# Patient Record
Sex: FEMALE | Race: WHITE | NOT HISPANIC OR LATINO | Employment: OTHER | URBAN - METROPOLITAN AREA
[De-identification: names, ages, dates, MRNs, and addresses within clinical notes are randomized per-mention and may not be internally consistent; named-entity substitution may affect disease eponyms.]

---

## 2017-03-02 ENCOUNTER — HISTORICAL (OUTPATIENT)
Dept: ADMINISTRATIVE | Facility: HOSPITAL | Age: 82
End: 2017-03-02

## 2017-05-15 ENCOUNTER — HISTORICAL (OUTPATIENT)
Dept: ADMINISTRATIVE | Facility: HOSPITAL | Age: 82
End: 2017-05-15

## 2017-05-15 LAB
ABS NEUT (OLG): 5.9 X10(3)/MCL (ref 2.1–9.2)
ALBUMIN SERPL-MCNC: 3.3 GM/DL (ref 3.4–5)
ALBUMIN/GLOB SERPL: 1.1 RATIO (ref 1.1–2)
ALP SERPL-CCNC: 50 UNIT/L (ref 38–126)
ALT SERPL-CCNC: 19 UNIT/L (ref 12–78)
AST SERPL-CCNC: 11 UNIT/L (ref 15–37)
BASOPHILS # BLD AUTO: 0 X10(3)/MCL (ref 0–0.2)
BASOPHILS NFR BLD AUTO: 0 %
BILIRUB SERPL-MCNC: 0.5 MG/DL (ref 0.2–1)
BILIRUBIN DIRECT+TOT PNL SERPL-MCNC: 0.2 MG/DL (ref 0–0.5)
BILIRUBIN DIRECT+TOT PNL SERPL-MCNC: 0.3 MG/DL (ref 0–0.8)
BUN SERPL-MCNC: 20 MG/DL (ref 7–18)
CALCIUM SERPL-MCNC: 9.4 MG/DL (ref 8.5–10.1)
CHLORIDE SERPL-SCNC: 107 MMOL/L (ref 98–107)
CO2 SERPL-SCNC: 28 MMOL/L (ref 21–32)
CREAT SERPL-MCNC: 0.7 MG/DL (ref 0.55–1.02)
DEPRECATED CALCIDIOL+CALCIFEROL SERPL-MC: 34.44 NG/ML (ref 30–80)
EOSINOPHIL # BLD AUTO: 0.2 X10(3)/MCL (ref 0–0.9)
EOSINOPHIL NFR BLD AUTO: 2 %
ERYTHROCYTE [DISTWIDTH] IN BLOOD BY AUTOMATED COUNT: 15 % (ref 11.5–17)
ERYTHROCYTE [SEDIMENTATION RATE] IN BLOOD: 28 MM/HR (ref 0–20)
EST. AVERAGE GLUCOSE BLD GHB EST-MCNC: 189 MG/DL
GLOBULIN SER-MCNC: 3 GM/DL (ref 2.4–3.5)
GLUCOSE SERPL-MCNC: 66 MG/DL (ref 74–106)
HBA1C MFR BLD: 8.2 % (ref 4.2–6.3)
HCT VFR BLD AUTO: 36.4 % (ref 37–47)
HGB BLD-MCNC: 11.4 GM/DL (ref 12–16)
LYMPHOCYTES # BLD AUTO: 1.4 X10(3)/MCL (ref 0.6–4.6)
LYMPHOCYTES NFR BLD AUTO: 16 %
MAGNESIUM SERPL-MCNC: 2.1 MG/DL (ref 1.8–2.4)
MCH RBC QN AUTO: 25.7 PG (ref 27–31)
MCHC RBC AUTO-ENTMCNC: 31.3 GM/DL (ref 33–36)
MCV RBC AUTO: 82.2 FL (ref 80–94)
MONOCYTES # BLD AUTO: 0.7 X10(3)/MCL (ref 0.1–1.3)
MONOCYTES NFR BLD AUTO: 9 %
NEUTROPHILS # BLD AUTO: 5.9 X10(3)/MCL (ref 2.1–9.2)
NEUTROPHILS NFR BLD AUTO: 72 %
PLATELET # BLD AUTO: 308 X10(3)/MCL (ref 130–400)
PMV BLD AUTO: 11.2 FL (ref 9.4–12.4)
POTASSIUM SERPL-SCNC: 4 MMOL/L (ref 3.5–5.1)
PROT SERPL-MCNC: 6.3 GM/DL (ref 6.4–8.2)
RBC # BLD AUTO: 4.43 X10(6)/MCL (ref 4.2–5.4)
SODIUM SERPL-SCNC: 147 MMOL/L (ref 136–145)
WBC # SPEC AUTO: 8.2 X10(3)/MCL (ref 4.5–11.5)

## 2017-05-18 ENCOUNTER — HISTORICAL (OUTPATIENT)
Dept: ADMINISTRATIVE | Facility: HOSPITAL | Age: 82
End: 2017-05-18

## 2017-07-17 ENCOUNTER — HISTORICAL (OUTPATIENT)
Dept: ADMINISTRATIVE | Facility: HOSPITAL | Age: 82
End: 2017-07-17

## 2017-07-17 LAB
ABS NEUT (OLG): 5.4 X10(3)/MCL (ref 2.1–9.2)
ALBUMIN SERPL-MCNC: 3.3 GM/DL (ref 3.4–5)
ALBUMIN/GLOB SERPL: 1.1 {RATIO}
ALP SERPL-CCNC: 53 UNIT/L (ref 38–126)
ALT SERPL-CCNC: 19 UNIT/L (ref 12–78)
AST SERPL-CCNC: 7 UNIT/L (ref 15–37)
BASOPHILS # BLD AUTO: 0 X10(3)/MCL (ref 0–0.2)
BASOPHILS NFR BLD AUTO: 0 %
BILIRUB SERPL-MCNC: 0.3 MG/DL (ref 0.2–1)
BILIRUBIN DIRECT+TOT PNL SERPL-MCNC: 0.1 MG/DL (ref 0–0.8)
BILIRUBIN DIRECT+TOT PNL SERPL-MCNC: 0.2 MG/DL (ref 0–0.2)
BUN SERPL-MCNC: 21 MG/DL (ref 7–18)
CALCIUM SERPL-MCNC: 9.1 MG/DL (ref 8.5–10.1)
CHLORIDE SERPL-SCNC: 105 MMOL/L (ref 98–107)
CO2 SERPL-SCNC: 33 MMOL/L (ref 21–32)
CREAT SERPL-MCNC: 0.66 MG/DL (ref 0.55–1.02)
DEPRECATED CALCIDIOL+CALCIFEROL SERPL-MC: 31.34 NG/ML (ref 30–80)
EOSINOPHIL # BLD AUTO: 0.2 X10(3)/MCL (ref 0–0.9)
EOSINOPHIL NFR BLD AUTO: 2 %
ERYTHROCYTE [DISTWIDTH] IN BLOOD BY AUTOMATED COUNT: 14.9 % (ref 11.5–17)
ERYTHROCYTE [SEDIMENTATION RATE] IN BLOOD: 25 MM/HR (ref 0–20)
GLOBULIN SER-MCNC: 3 GM/DL (ref 2.4–3.5)
GLUCOSE SERPL-MCNC: 147 MG/DL (ref 74–106)
HCT VFR BLD AUTO: 36.2 % (ref 37–47)
HGB BLD-MCNC: 11.3 GM/DL (ref 12–16)
LYMPHOCYTES # BLD AUTO: 1.3 X10(3)/MCL (ref 0.6–4.6)
LYMPHOCYTES NFR BLD AUTO: 17 %
MAGNESIUM SERPL-MCNC: 1.8 MG/DL (ref 1.8–2.4)
MCH RBC QN AUTO: 25.6 PG (ref 27–31)
MCHC RBC AUTO-ENTMCNC: 31.2 GM/DL (ref 33–36)
MCV RBC AUTO: 81.9 FL (ref 80–94)
MONOCYTES # BLD AUTO: 0.7 X10(3)/MCL (ref 0.1–1.3)
MONOCYTES NFR BLD AUTO: 9 %
NEUTROPHILS # BLD AUTO: 5.4 X10(3)/MCL (ref 2.1–9.2)
NEUTROPHILS NFR BLD AUTO: 71 %
PLATELET # BLD AUTO: 296 X10(3)/MCL (ref 130–400)
PMV BLD AUTO: 11.4 FL (ref 9.4–12.4)
POTASSIUM SERPL-SCNC: 3.9 MMOL/L (ref 3.5–5.1)
PROT SERPL-MCNC: 6.3 GM/DL (ref 6.4–8.2)
RBC # BLD AUTO: 4.42 X10(6)/MCL (ref 4.2–5.4)
SODIUM SERPL-SCNC: 144 MMOL/L (ref 136–145)
WBC # SPEC AUTO: 7.6 X10(3)/MCL (ref 4.5–11.5)

## 2017-08-24 ENCOUNTER — HISTORICAL (OUTPATIENT)
Dept: ADMINISTRATIVE | Facility: HOSPITAL | Age: 82
End: 2017-08-24

## 2017-08-24 LAB
ABS NEUT (OLG): 5.65 X10(3)/MCL (ref 2.1–9.2)
ALBUMIN SERPL-MCNC: 3.2 GM/DL (ref 3.4–5)
ALBUMIN/GLOB SERPL: 1 RATIO (ref 1.1–2)
ALP SERPL-CCNC: 57 UNIT/L (ref 38–126)
ALT SERPL-CCNC: 20 UNIT/L (ref 12–78)
AST SERPL-CCNC: 11 UNIT/L (ref 15–37)
BASOPHILS # BLD AUTO: 0 X10(3)/MCL (ref 0–0.2)
BASOPHILS NFR BLD AUTO: 0 %
BILIRUB SERPL-MCNC: 0.3 MG/DL (ref 0.2–1)
BILIRUBIN DIRECT+TOT PNL SERPL-MCNC: 0.1 MG/DL (ref 0–0.5)
BILIRUBIN DIRECT+TOT PNL SERPL-MCNC: 0.2 MG/DL (ref 0–0.8)
BUN SERPL-MCNC: 21 MG/DL (ref 7–18)
CALCIUM SERPL-MCNC: 9.4 MG/DL (ref 8.5–10.1)
CHLORIDE SERPL-SCNC: 106 MMOL/L (ref 98–107)
CHOLEST SERPL-MCNC: 139 MG/DL (ref 0–200)
CHOLEST/HDLC SERPL: 3.8 {RATIO} (ref 0–4)
CK SERPL-CCNC: 56 UNIT/L (ref 26–192)
CO2 SERPL-SCNC: 30 MMOL/L (ref 21–32)
CREAT SERPL-MCNC: 0.84 MG/DL (ref 0.55–1.02)
EOSINOPHIL # BLD AUTO: 0.2 X10(3)/MCL (ref 0–0.9)
EOSINOPHIL NFR BLD AUTO: 2 %
ERYTHROCYTE [DISTWIDTH] IN BLOOD BY AUTOMATED COUNT: 15.3 % (ref 11.5–17)
EST. AVERAGE GLUCOSE BLD GHB EST-MCNC: 189 MG/DL
GLOBULIN SER-MCNC: 3.2 GM/DL (ref 2.4–3.5)
GLUCOSE SERPL-MCNC: 157 MG/DL (ref 74–106)
HBA1C MFR BLD: 8.2 % (ref 4.2–6.3)
HCT VFR BLD AUTO: 34.5 % (ref 37–47)
HDLC SERPL-MCNC: 37 MG/DL (ref 35–60)
HGB BLD-MCNC: 10.8 GM/DL (ref 12–16)
LDLC SERPL CALC-MCNC: 68 MG/DL (ref 0–129)
LYMPHOCYTES # BLD AUTO: 1.2 X10(3)/MCL (ref 0.6–4.6)
LYMPHOCYTES NFR BLD AUTO: 16 %
MCH RBC QN AUTO: 25.1 PG (ref 27–31)
MCHC RBC AUTO-ENTMCNC: 31.3 GM/DL (ref 33–36)
MCV RBC AUTO: 80 FL (ref 80–94)
MONOCYTES # BLD AUTO: 0.8 X10(3)/MCL (ref 0.1–1.3)
MONOCYTES NFR BLD AUTO: 10 %
NEUTROPHILS # BLD AUTO: 5.65 X10(3)/MCL (ref 2.1–9.2)
NEUTROPHILS NFR BLD AUTO: 72 %
PLATELET # BLD AUTO: 287 X10(3)/MCL (ref 130–400)
PMV BLD AUTO: 10.4 FL (ref 9.4–12.4)
POTASSIUM SERPL-SCNC: 3.6 MMOL/L (ref 3.5–5.1)
PROT SERPL-MCNC: 6.4 GM/DL (ref 6.4–8.2)
RBC # BLD AUTO: 4.31 X10(6)/MCL (ref 4.2–5.4)
SODIUM SERPL-SCNC: 144 MMOL/L (ref 136–145)
TRIGL SERPL-MCNC: 169 MG/DL (ref 30–150)
TSH SERPL-ACNC: 1.13 MIU/ML (ref 0.36–3.74)
VLDLC SERPL CALC-MCNC: 34 MG/DL
WBC # SPEC AUTO: 7.9 X10(3)/MCL (ref 4.5–11.5)

## 2017-11-27 ENCOUNTER — HISTORICAL (OUTPATIENT)
Dept: ADMINISTRATIVE | Facility: HOSPITAL | Age: 82
End: 2017-11-27

## 2017-11-27 LAB
ABS NEUT (OLG): 5.52 X10(3)/MCL (ref 2.1–9.2)
ALBUMIN SERPL-MCNC: 3.1 GM/DL (ref 3.4–5)
ALBUMIN/GLOB SERPL: 1 {RATIO}
ALP SERPL-CCNC: 76 UNIT/L (ref 38–126)
ALT SERPL-CCNC: 27 UNIT/L (ref 12–78)
AST SERPL-CCNC: 14 UNIT/L (ref 15–37)
BASOPHILS # BLD AUTO: 0 X10(3)/MCL (ref 0–0.2)
BASOPHILS NFR BLD AUTO: 0 %
BILIRUB SERPL-MCNC: 0.7 MG/DL (ref 0.2–1)
BILIRUBIN DIRECT+TOT PNL SERPL-MCNC: 0.2 MG/DL (ref 0–0.2)
BILIRUBIN DIRECT+TOT PNL SERPL-MCNC: 0.5 MG/DL (ref 0–0.8)
BUN SERPL-MCNC: 15 MG/DL (ref 7–18)
CALCIUM SERPL-MCNC: 9.1 MG/DL (ref 8.5–10.1)
CHLORIDE SERPL-SCNC: 106 MMOL/L (ref 98–107)
CO2 SERPL-SCNC: 32 MMOL/L (ref 21–32)
CREAT SERPL-MCNC: 0.64 MG/DL (ref 0.55–1.02)
DEPRECATED CALCIDIOL+CALCIFEROL SERPL-MC: 34.35 NG/ML (ref 30–80)
EOSINOPHIL # BLD AUTO: 0.2 X10(3)/MCL (ref 0–0.9)
EOSINOPHIL NFR BLD AUTO: 3 %
ERYTHROCYTE [DISTWIDTH] IN BLOOD BY AUTOMATED COUNT: 15.9 % (ref 11.5–17)
ERYTHROCYTE [SEDIMENTATION RATE] IN BLOOD: 33 MM/HR (ref 0–20)
GLOBULIN SER-MCNC: 3.2 GM/DL (ref 2.4–3.5)
GLUCOSE SERPL-MCNC: 111 MG/DL (ref 74–106)
HCT VFR BLD AUTO: 35.9 % (ref 37–47)
HGB BLD-MCNC: 11 GM/DL (ref 12–16)
LYMPHOCYTES # BLD AUTO: 1.1 X10(3)/MCL (ref 0.6–4.6)
LYMPHOCYTES NFR BLD AUTO: 15 %
MAGNESIUM SERPL-MCNC: 1.8 MG/DL (ref 1.8–2.4)
MCH RBC QN AUTO: 24.4 PG (ref 27–31)
MCHC RBC AUTO-ENTMCNC: 30.6 GM/DL (ref 33–36)
MCV RBC AUTO: 79.6 FL (ref 80–94)
MONOCYTES # BLD AUTO: 0.8 X10(3)/MCL (ref 0.1–1.3)
MONOCYTES NFR BLD AUTO: 10 %
NEUTROPHILS # BLD AUTO: 5.52 X10(3)/MCL (ref 2.1–9.2)
NEUTROPHILS NFR BLD AUTO: 71 %
PLATELET # BLD AUTO: 298 X10(3)/MCL (ref 130–400)
PMV BLD AUTO: 10.5 FL (ref 9.4–12.4)
POTASSIUM SERPL-SCNC: 3.7 MMOL/L (ref 3.5–5.1)
PROT SERPL-MCNC: 6.3 GM/DL (ref 6.4–8.2)
RBC # BLD AUTO: 4.51 X10(6)/MCL (ref 4.2–5.4)
SODIUM SERPL-SCNC: 144 MMOL/L (ref 136–145)
WBC # SPEC AUTO: 7.8 X10(3)/MCL (ref 4.5–11.5)

## 2017-11-29 ENCOUNTER — HISTORICAL (OUTPATIENT)
Dept: ADMINISTRATIVE | Facility: HOSPITAL | Age: 82
End: 2017-11-29

## 2018-02-20 ENCOUNTER — HISTORICAL (OUTPATIENT)
Dept: ADMINISTRATIVE | Facility: HOSPITAL | Age: 83
End: 2018-02-20

## 2018-02-20 LAB
ABS NEUT (OLG): 5.2 X10(3)/MCL (ref 2.1–9.2)
ALBUMIN SERPL-MCNC: 3 GM/DL (ref 3.4–5)
ALBUMIN/GLOB SERPL: 0.9 {RATIO}
ALP SERPL-CCNC: 59 UNIT/L (ref 38–126)
ALT SERPL-CCNC: 14 UNIT/L (ref 12–78)
APPEARANCE, UA: ABNORMAL
AST SERPL-CCNC: 5 UNIT/L (ref 15–37)
BACTERIA SPEC CULT: ABNORMAL /HPF
BASOPHILS # BLD AUTO: 0 X10(3)/MCL (ref 0–0.2)
BASOPHILS NFR BLD AUTO: 0 %
BILIRUB SERPL-MCNC: 0.4 MG/DL (ref 0.2–1)
BILIRUB UR QL STRIP: NEGATIVE
BILIRUBIN DIRECT+TOT PNL SERPL-MCNC: 0.1 MG/DL (ref 0–0.2)
BILIRUBIN DIRECT+TOT PNL SERPL-MCNC: 0.3 MG/DL (ref 0–0.8)
BUN SERPL-MCNC: 28 MG/DL (ref 7–18)
CALCIUM SERPL-MCNC: 9 MG/DL (ref 8.5–10.1)
CHLORIDE SERPL-SCNC: 105 MMOL/L (ref 98–107)
CHOLEST SERPL-MCNC: 126 MG/DL (ref 0–200)
CHOLEST/HDLC SERPL: 3.7 {RATIO} (ref 0–4)
CK SERPL-CCNC: 28 UNIT/L (ref 26–192)
CO2 SERPL-SCNC: 32 MMOL/L (ref 21–32)
COLOR UR: YELLOW
CREAT SERPL-MCNC: 0.92 MG/DL (ref 0.55–1.02)
EOSINOPHIL # BLD AUTO: 0.3 X10(3)/MCL (ref 0–0.9)
EOSINOPHIL NFR BLD AUTO: 4 %
ERYTHROCYTE [DISTWIDTH] IN BLOOD BY AUTOMATED COUNT: 17.1 % (ref 11.5–17)
EST. AVERAGE GLUCOSE BLD GHB EST-MCNC: 171 MG/DL
GLOBULIN SER-MCNC: 3.3 GM/DL (ref 2.4–3.5)
GLUCOSE (UA): NEGATIVE
GLUCOSE SERPL-MCNC: 82 MG/DL (ref 74–106)
HBA1C MFR BLD: 7.6 % (ref 4.2–6.3)
HCT VFR BLD AUTO: 34.1 % (ref 37–47)
HDLC SERPL-MCNC: 34 MG/DL (ref 35–60)
HGB BLD-MCNC: 10.6 GM/DL (ref 12–16)
HGB UR QL STRIP: NEGATIVE
KETONES UR QL STRIP: NEGATIVE
LDLC SERPL CALC-MCNC: 73 MG/DL (ref 0–129)
LEUKOCYTE ESTERASE UR QL STRIP: ABNORMAL
LYMPHOCYTES # BLD AUTO: 1.3 X10(3)/MCL (ref 0.6–4.6)
LYMPHOCYTES NFR BLD AUTO: 17 %
MCH RBC QN AUTO: 24.4 PG (ref 27–31)
MCHC RBC AUTO-ENTMCNC: 31.1 GM/DL (ref 33–36)
MCV RBC AUTO: 78.6 FL (ref 80–94)
MONOCYTES # BLD AUTO: 0.7 X10(3)/MCL (ref 0.1–1.3)
MONOCYTES NFR BLD AUTO: 10 %
NEUTROPHILS # BLD AUTO: 5.2 X10(3)/MCL (ref 2.1–9.2)
NEUTROPHILS NFR BLD AUTO: 69 %
NITRITE UR QL STRIP: NEGATIVE
PH UR STRIP: 6 [PH] (ref 5–9)
PLATELET # BLD AUTO: 299 X10(3)/MCL (ref 130–400)
PMV BLD AUTO: 10.7 FL (ref 9.4–12.4)
POTASSIUM SERPL-SCNC: 3.9 MMOL/L (ref 3.5–5.1)
PROT SERPL-MCNC: 6.3 GM/DL (ref 6.4–8.2)
PROT UR QL STRIP: NEGATIVE
RBC # BLD AUTO: 4.34 X10(6)/MCL (ref 4.2–5.4)
RBC #/AREA URNS HPF: ABNORMAL /[HPF]
SODIUM SERPL-SCNC: 143 MMOL/L (ref 136–145)
SP GR UR STRIP: 1.02 (ref 1–1.03)
SQUAMOUS EPITHELIAL, UA: ABNORMAL
TRIGL SERPL-MCNC: 97 MG/DL (ref 30–150)
TSH SERPL-ACNC: 2.23 MIU/ML (ref 0.36–3.74)
UROBILINOGEN UR STRIP-ACNC: 0.2
VLDLC SERPL CALC-MCNC: 19 MG/DL
WBC # SPEC AUTO: 7.6 X10(3)/MCL (ref 4.5–11.5)
WBC #/AREA URNS HPF: 6 /HPF (ref 0–3)

## 2018-02-22 LAB — FINAL CULTURE: NORMAL

## 2018-05-14 ENCOUNTER — HISTORICAL (OUTPATIENT)
Dept: ADMINISTRATIVE | Facility: HOSPITAL | Age: 83
End: 2018-05-14

## 2018-05-14 LAB
ABS NEUT (OLG): 6 X10(3)/MCL (ref 2.1–9.2)
ALBUMIN SERPL-MCNC: 3.3 GM/DL (ref 3.4–5)
ALBUMIN/GLOB SERPL: 1.1 RATIO (ref 1.1–2)
ALP SERPL-CCNC: 54 UNIT/L (ref 38–126)
ALT SERPL-CCNC: 20 UNIT/L (ref 12–78)
AST SERPL-CCNC: 9 UNIT/L (ref 15–37)
BASOPHILS # BLD AUTO: 0 X10(3)/MCL (ref 0–0.2)
BASOPHILS NFR BLD AUTO: 0 %
BILIRUB SERPL-MCNC: 0.4 MG/DL (ref 0.2–1)
BILIRUBIN DIRECT+TOT PNL SERPL-MCNC: 0.1 MG/DL (ref 0–0.5)
BILIRUBIN DIRECT+TOT PNL SERPL-MCNC: 0.3 MG/DL (ref 0–0.8)
BUN SERPL-MCNC: 30 MG/DL (ref 7–18)
CALCIUM SERPL-MCNC: 9.3 MG/DL (ref 8.5–10.1)
CHLORIDE SERPL-SCNC: 104 MMOL/L (ref 98–107)
CO2 SERPL-SCNC: 32 MMOL/L (ref 21–32)
CREAT SERPL-MCNC: 0.83 MG/DL (ref 0.55–1.02)
DEPRECATED CALCIDIOL+CALCIFEROL SERPL-MC: 38 NG/ML (ref 30–80)
EOSINOPHIL # BLD AUTO: 0.2 X10(3)/MCL (ref 0–0.9)
EOSINOPHIL NFR BLD AUTO: 3 %
ERYTHROCYTE [DISTWIDTH] IN BLOOD BY AUTOMATED COUNT: 15.8 % (ref 11.5–17)
ERYTHROCYTE [SEDIMENTATION RATE] IN BLOOD: 43 MM/HR (ref 0–20)
GLOBULIN SER-MCNC: 3 GM/DL (ref 2.4–3.5)
GLUCOSE SERPL-MCNC: 150 MG/DL (ref 74–106)
HCT VFR BLD AUTO: 34.2 % (ref 37–47)
HGB BLD-MCNC: 10.5 GM/DL (ref 12–16)
LYMPHOCYTES # BLD AUTO: 1.1 X10(3)/MCL (ref 0.6–4.6)
LYMPHOCYTES NFR BLD AUTO: 14 %
MAGNESIUM SERPL-MCNC: 1.8 MG/DL (ref 1.8–2.4)
MCH RBC QN AUTO: 25.3 PG (ref 27–31)
MCHC RBC AUTO-ENTMCNC: 30.7 GM/DL (ref 33–36)
MCV RBC AUTO: 82.4 FL (ref 80–94)
MONOCYTES # BLD AUTO: 0.8 X10(3)/MCL (ref 0.1–1.3)
MONOCYTES NFR BLD AUTO: 10 %
NEUTROPHILS # BLD AUTO: 6 X10(3)/MCL (ref 2.1–9.2)
NEUTROPHILS NFR BLD AUTO: 73 %
PLATELET # BLD AUTO: 279 X10(3)/MCL (ref 130–400)
PMV BLD AUTO: 11.2 FL (ref 9.4–12.4)
POTASSIUM SERPL-SCNC: 3.7 MMOL/L (ref 3.5–5.1)
PROT SERPL-MCNC: 6.3 GM/DL (ref 6.4–8.2)
RBC # BLD AUTO: 4.15 X10(6)/MCL (ref 4.2–5.4)
SODIUM SERPL-SCNC: 142 MMOL/L (ref 136–145)
WBC # SPEC AUTO: 8.2 X10(3)/MCL (ref 4.5–11.5)

## 2018-06-21 ENCOUNTER — HISTORICAL (OUTPATIENT)
Dept: ADMINISTRATIVE | Facility: HOSPITAL | Age: 83
End: 2018-06-21

## 2018-06-21 LAB
ABS NEUT (OLG): 5.96 X10(3)/MCL (ref 2.1–9.2)
ALBUMIN SERPL-MCNC: 3.2 GM/DL (ref 3.4–5)
ALBUMIN/GLOB SERPL: 1 RATIO (ref 1.1–2)
ALP SERPL-CCNC: 60 UNIT/L (ref 38–126)
ALT SERPL-CCNC: 20 UNIT/L (ref 12–78)
AST SERPL-CCNC: 12 UNIT/L (ref 15–37)
BASOPHILS # BLD AUTO: 0 X10(3)/MCL (ref 0–0.2)
BASOPHILS NFR BLD AUTO: 0 %
BILIRUB SERPL-MCNC: 0.4 MG/DL (ref 0.2–1)
BILIRUBIN DIRECT+TOT PNL SERPL-MCNC: 0.2 MG/DL (ref 0–0.5)
BILIRUBIN DIRECT+TOT PNL SERPL-MCNC: 0.2 MG/DL (ref 0–0.8)
BUN SERPL-MCNC: 22 MG/DL (ref 7–18)
CALCIUM SERPL-MCNC: 9.1 MG/DL (ref 8.5–10.1)
CHLORIDE SERPL-SCNC: 104 MMOL/L (ref 98–107)
CHOLEST SERPL-MCNC: 149 MG/DL (ref 0–200)
CHOLEST/HDLC SERPL: 4.8 {RATIO} (ref 0–4)
CK SERPL-CCNC: 42 UNIT/L (ref 26–192)
CO2 SERPL-SCNC: 30 MMOL/L (ref 21–32)
CREAT SERPL-MCNC: 0.82 MG/DL (ref 0.55–1.02)
EOSINOPHIL # BLD AUTO: 0.1 X10(3)/MCL (ref 0–0.9)
EOSINOPHIL NFR BLD AUTO: 1 %
ERYTHROCYTE [DISTWIDTH] IN BLOOD BY AUTOMATED COUNT: 15.9 % (ref 11.5–17)
EST. AVERAGE GLUCOSE BLD GHB EST-MCNC: 174 MG/DL
GLOBULIN SER-MCNC: 3.1 GM/DL (ref 2.4–3.5)
GLUCOSE SERPL-MCNC: 185 MG/DL (ref 74–106)
HBA1C MFR BLD: 7.7 % (ref 4.2–6.3)
HCT VFR BLD AUTO: 36.8 % (ref 37–47)
HDLC SERPL-MCNC: 31 MG/DL (ref 35–60)
HGB BLD-MCNC: 11.2 GM/DL (ref 12–16)
LDLC SERPL CALC-MCNC: 94 MG/DL (ref 0–129)
LYMPHOCYTES # BLD AUTO: 1 X10(3)/MCL (ref 0.6–4.6)
LYMPHOCYTES NFR BLD AUTO: 13 %
MCH RBC QN AUTO: 24.9 PG (ref 27–31)
MCHC RBC AUTO-ENTMCNC: 30.4 GM/DL (ref 33–36)
MCV RBC AUTO: 81.8 FL (ref 80–94)
MONOCYTES # BLD AUTO: 0.6 X10(3)/MCL (ref 0.1–1.3)
MONOCYTES NFR BLD AUTO: 7 %
NEUTROPHILS # BLD AUTO: 5.96 X10(3)/MCL (ref 2.1–9.2)
NEUTROPHILS NFR BLD AUTO: 77 %
PLATELET # BLD AUTO: 290 X10(3)/MCL (ref 130–400)
PMV BLD AUTO: 10.9 FL (ref 9.4–12.4)
POTASSIUM SERPL-SCNC: 4.1 MMOL/L (ref 3.5–5.1)
PROT SERPL-MCNC: 6.3 GM/DL (ref 6.4–8.2)
RBC # BLD AUTO: 4.5 X10(6)/MCL (ref 4.2–5.4)
SODIUM SERPL-SCNC: 141 MMOL/L (ref 136–145)
TRIGL SERPL-MCNC: 119 MG/DL (ref 30–150)
TSH SERPL-ACNC: 1.68 MIU/L (ref 0.36–3.74)
VLDLC SERPL CALC-MCNC: 24 MG/DL
WBC # SPEC AUTO: 7.7 X10(3)/MCL (ref 4.5–11.5)

## 2018-07-16 ENCOUNTER — HISTORICAL (OUTPATIENT)
Dept: ADMINISTRATIVE | Facility: HOSPITAL | Age: 83
End: 2018-07-16

## 2018-07-16 LAB
BUN SERPL-MCNC: 25 MG/DL (ref 7–18)
CALCIUM SERPL-MCNC: 9.1 MG/DL (ref 8.5–10.1)
CHLORIDE SERPL-SCNC: 107 MMOL/L (ref 98–107)
CO2 SERPL-SCNC: 29 MMOL/L (ref 21–32)
CREAT SERPL-MCNC: 0.73 MG/DL (ref 0.55–1.02)
CREAT/UREA NIT SERPL: 34.2
EST. AVERAGE GLUCOSE BLD GHB EST-MCNC: 189 MG/DL
GLUCOSE SERPL-MCNC: 77 MG/DL (ref 74–106)
HBA1C MFR BLD: 8.2 % (ref 4.2–6.3)
POTASSIUM SERPL-SCNC: 3.8 MMOL/L (ref 3.5–5.1)
SODIUM SERPL-SCNC: 145 MMOL/L (ref 136–145)

## 2018-08-30 ENCOUNTER — HISTORICAL (OUTPATIENT)
Dept: ADMINISTRATIVE | Facility: HOSPITAL | Age: 83
End: 2018-08-30

## 2018-08-30 LAB
APPEARANCE, UA: CLEAR
BACTERIA SPEC CULT: ABNORMAL /HPF
BILIRUB UR QL STRIP: NEGATIVE
COLOR UR: YELLOW
GLUCOSE (UA): NEGATIVE
HGB UR QL STRIP: NEGATIVE
KETONES UR QL STRIP: NEGATIVE
LEUKOCYTE ESTERASE UR QL STRIP: NEGATIVE
NITRITE UR QL STRIP: NEGATIVE
PH UR STRIP: 7 [PH] (ref 5–9)
PROT UR QL STRIP: NEGATIVE
RBC #/AREA URNS HPF: 1 /HPF (ref 0–2)
SP GR UR STRIP: 1.01 (ref 1–1.03)
SQUAMOUS EPITHELIAL, UA: ABNORMAL
UROBILINOGEN UR STRIP-ACNC: 0.2
WBC #/AREA URNS HPF: 3 /HPF (ref 0–3)

## 2018-09-01 LAB — FINAL CULTURE: NORMAL

## 2019-01-16 ENCOUNTER — HISTORICAL (OUTPATIENT)
Dept: ADMINISTRATIVE | Facility: HOSPITAL | Age: 84
End: 2019-01-16

## 2019-01-16 LAB
ABS NEUT (OLG): 5.47 X10(3)/MCL (ref 2.1–9.2)
ALBUMIN SERPL-MCNC: 3.2 GM/DL (ref 3.4–5)
ALBUMIN/GLOB SERPL: 1.1 RATIO (ref 1.1–2)
ALP SERPL-CCNC: 44 UNIT/L (ref 38–126)
ALT SERPL-CCNC: 19 UNIT/L (ref 12–78)
AST SERPL-CCNC: 13 UNIT/L (ref 15–37)
BASOPHILS # BLD AUTO: 0 X10(3)/MCL (ref 0–0.2)
BASOPHILS NFR BLD AUTO: 0 %
BILIRUB SERPL-MCNC: 0.7 MG/DL (ref 0.2–1)
BILIRUBIN DIRECT+TOT PNL SERPL-MCNC: 0.3 MG/DL (ref 0–0.5)
BILIRUBIN DIRECT+TOT PNL SERPL-MCNC: 0.4 MG/DL (ref 0–0.8)
BUN SERPL-MCNC: 26 MG/DL (ref 7–18)
CALCIUM SERPL-MCNC: 9.5 MG/DL (ref 8.5–10.1)
CHLORIDE SERPL-SCNC: 107 MMOL/L (ref 98–107)
CO2 SERPL-SCNC: 31 MMOL/L (ref 21–32)
CREAT SERPL-MCNC: 0.68 MG/DL (ref 0.55–1.02)
DEPRECATED CALCIDIOL+CALCIFEROL SERPL-MC: 36.08 NG/ML (ref 30–80)
EOSINOPHIL # BLD AUTO: 0.1 X10(3)/MCL (ref 0–0.9)
EOSINOPHIL NFR BLD AUTO: 2 %
ERYTHROCYTE [DISTWIDTH] IN BLOOD BY AUTOMATED COUNT: 17.3 % (ref 11.5–17)
ERYTHROCYTE [SEDIMENTATION RATE] IN BLOOD: 28 MM/HR (ref 0–20)
GLOBULIN SER-MCNC: 2.8 GM/DL (ref 2.4–3.5)
GLUCOSE SERPL-MCNC: 133 MG/DL (ref 74–106)
HCT VFR BLD AUTO: 35.3 % (ref 37–47)
HGB BLD-MCNC: 10.8 GM/DL (ref 12–16)
LYMPHOCYTES # BLD AUTO: 1 X10(3)/MCL (ref 0.6–4.6)
LYMPHOCYTES NFR BLD AUTO: 13 %
MAGNESIUM SERPL-MCNC: 2 MG/DL (ref 1.8–2.4)
MCH RBC QN AUTO: 25.2 PG (ref 27–31)
MCHC RBC AUTO-ENTMCNC: 30.6 GM/DL (ref 33–36)
MCV RBC AUTO: 82.3 FL (ref 80–94)
MONOCYTES # BLD AUTO: 0.7 X10(3)/MCL (ref 0.1–1.3)
MONOCYTES NFR BLD AUTO: 10 %
NEUTROPHILS # BLD AUTO: 5.47 X10(3)/MCL (ref 2.1–9.2)
NEUTROPHILS NFR BLD AUTO: 74 %
PLATELET # BLD AUTO: 258 X10(3)/MCL (ref 130–400)
PMV BLD AUTO: 11.3 FL (ref 9.4–12.4)
POTASSIUM SERPL-SCNC: 3.7 MMOL/L (ref 3.5–5.1)
PROT SERPL-MCNC: 6 GM/DL (ref 6.4–8.2)
RBC # BLD AUTO: 4.29 X10(6)/MCL (ref 4.2–5.4)
SODIUM SERPL-SCNC: 144 MMOL/L (ref 136–145)
WBC # SPEC AUTO: 7.4 X10(3)/MCL (ref 4.5–11.5)

## 2019-03-11 ENCOUNTER — HISTORICAL (OUTPATIENT)
Dept: ADMINISTRATIVE | Facility: HOSPITAL | Age: 84
End: 2019-03-11

## 2019-03-11 LAB
ABS NEUT (OLG): 6.73 X10(3)/MCL (ref 2.1–9.2)
ALBUMIN SERPL-MCNC: 3.5 GM/DL (ref 3.4–5)
ALBUMIN/GLOB SERPL: 1.1 RATIO (ref 1.1–2)
ALP SERPL-CCNC: 49 UNIT/L (ref 38–126)
ALT SERPL-CCNC: 22 UNIT/L (ref 12–78)
APPEARANCE, UA: CLEAR
AST SERPL-CCNC: 13 UNIT/L (ref 15–37)
BACTERIA SPEC CULT: NORMAL /HPF
BASOPHILS # BLD AUTO: 0 X10(3)/MCL (ref 0–0.2)
BASOPHILS NFR BLD AUTO: 0 %
BILIRUB SERPL-MCNC: 0.5 MG/DL (ref 0.2–1)
BILIRUB UR QL STRIP: NEGATIVE
BILIRUBIN DIRECT+TOT PNL SERPL-MCNC: 0.2 MG/DL (ref 0–0.5)
BILIRUBIN DIRECT+TOT PNL SERPL-MCNC: 0.3 MG/DL (ref 0–0.8)
BUN SERPL-MCNC: 22 MG/DL (ref 7–18)
CALCIUM SERPL-MCNC: 10 MG/DL (ref 8.5–10.1)
CHLORIDE SERPL-SCNC: 101 MMOL/L (ref 98–107)
CK SERPL-CCNC: 42 UNIT/L (ref 26–192)
CO2 SERPL-SCNC: 34 MMOL/L (ref 21–32)
COLOR UR: YELLOW
CREAT SERPL-MCNC: 0.76 MG/DL (ref 0.55–1.02)
EOSINOPHIL # BLD AUTO: 0.2 X10(3)/MCL (ref 0–0.9)
EOSINOPHIL NFR BLD AUTO: 2 %
ERYTHROCYTE [DISTWIDTH] IN BLOOD BY AUTOMATED COUNT: 16.8 % (ref 11.5–17)
EST. AVERAGE GLUCOSE BLD GHB EST-MCNC: 169 MG/DL
GLOBULIN SER-MCNC: 3.1 GM/DL (ref 2.4–3.5)
GLUCOSE (UA): NEGATIVE
GLUCOSE SERPL-MCNC: 77 MG/DL (ref 74–106)
HBA1C MFR BLD: 7.5 % (ref 4.2–6.3)
HCT VFR BLD AUTO: 39.8 % (ref 37–47)
HGB BLD-MCNC: 12 GM/DL (ref 12–16)
HGB UR QL STRIP: NEGATIVE
KETONES UR QL STRIP: NEGATIVE
LEUKOCYTE ESTERASE UR QL STRIP: NEGATIVE
LYMPHOCYTES # BLD AUTO: 1.4 X10(3)/MCL (ref 0.6–4.6)
LYMPHOCYTES NFR BLD AUTO: 15 %
MCH RBC QN AUTO: 25.8 PG (ref 27–31)
MCHC RBC AUTO-ENTMCNC: 30.2 GM/DL (ref 33–36)
MCV RBC AUTO: 85.6 FL (ref 80–94)
MONOCYTES # BLD AUTO: 1 X10(3)/MCL (ref 0.1–1.3)
MONOCYTES NFR BLD AUTO: 11 %
NEUTROPHILS # BLD AUTO: 6.73 X10(3)/MCL (ref 2.1–9.2)
NEUTROPHILS NFR BLD AUTO: 72 %
NITRITE UR QL STRIP: NEGATIVE
PH UR STRIP: 7.5 [PH] (ref 5–9)
PLATELET # BLD AUTO: 307 X10(3)/MCL (ref 130–400)
PMV BLD AUTO: 11.3 FL (ref 9.4–12.4)
POTASSIUM SERPL-SCNC: 3.5 MMOL/L (ref 3.5–5.1)
PROT SERPL-MCNC: 6.6 GM/DL (ref 6.4–8.2)
PROT UR QL STRIP: NEGATIVE
RBC # BLD AUTO: 4.65 X10(6)/MCL (ref 4.2–5.4)
RBC #/AREA URNS HPF: NORMAL /[HPF]
SODIUM SERPL-SCNC: 142 MMOL/L (ref 136–145)
SP GR UR STRIP: 1.01 (ref 1–1.03)
SQUAMOUS EPITHELIAL, UA: NORMAL
TSH SERPL-ACNC: 1.65 MIU/L (ref 0.36–3.74)
UROBILINOGEN UR STRIP-ACNC: 0.2
WBC # SPEC AUTO: 9.4 X10(3)/MCL (ref 4.5–11.5)
WBC #/AREA URNS HPF: NORMAL /[HPF]

## 2019-03-18 ENCOUNTER — HOSPITAL ENCOUNTER (OUTPATIENT)
Dept: MEDSURG UNIT | Facility: HOSPITAL | Age: 84
End: 2019-03-24
Attending: INTERNAL MEDICINE | Admitting: INTERNAL MEDICINE

## 2019-03-18 LAB
ABS NEUT (OLG): 5.81 X10(3)/MCL (ref 2.1–9.2)
ALBUMIN SERPL-MCNC: 3.2 GM/DL (ref 3.4–5)
ALBUMIN SERPL-MCNC: 3.2 GM/DL (ref 3.4–5)
ALBUMIN/GLOB SERPL: 1.1 RATIO (ref 1.1–2)
ALP SERPL-CCNC: 46 UNIT/L (ref 38–126)
ALP SERPL-CCNC: 47 UNIT/L (ref 38–126)
ALT SERPL-CCNC: 21 UNIT/L (ref 12–78)
ALT SERPL-CCNC: 22 UNIT/L (ref 12–78)
APPEARANCE, UA: CLEAR
AST SERPL-CCNC: 18 UNIT/L (ref 15–37)
AST SERPL-CCNC: 22 UNIT/L (ref 15–37)
BACTERIA SPEC CULT: NORMAL /HPF
BASOPHILS # BLD AUTO: 0 X10(3)/MCL (ref 0–0.2)
BASOPHILS NFR BLD AUTO: 0 %
BILIRUB SERPL-MCNC: 0.4 MG/DL (ref 0.2–1)
BILIRUB SERPL-MCNC: 0.4 MG/DL (ref 0.2–1)
BILIRUB UR QL STRIP: NEGATIVE
BILIRUBIN DIRECT+TOT PNL SERPL-MCNC: 0.2 MG/DL (ref 0–0.5)
BILIRUBIN DIRECT+TOT PNL SERPL-MCNC: 0.2 MG/DL (ref 0–0.5)
BILIRUBIN DIRECT+TOT PNL SERPL-MCNC: 0.2 MG/DL (ref 0–0.8)
BILIRUBIN DIRECT+TOT PNL SERPL-MCNC: 0.2 MG/DL (ref 0–0.8)
BUN SERPL-MCNC: 28 MG/DL (ref 7–18)
CALCIUM SERPL-MCNC: 9 MG/DL (ref 8.5–10.1)
CHLORIDE SERPL-SCNC: 105 MMOL/L (ref 98–107)
CK SERPL-CCNC: 54 UNIT/L (ref 26–192)
CO2 SERPL-SCNC: 30 MMOL/L (ref 21–32)
COLOR UR: YELLOW
CREAT SERPL-MCNC: 0.91 MG/DL (ref 0.55–1.02)
EOSINOPHIL # BLD AUTO: 0.2 X10(3)/MCL (ref 0–0.9)
EOSINOPHIL NFR BLD AUTO: 3 %
ERYTHROCYTE [DISTWIDTH] IN BLOOD BY AUTOMATED COUNT: 16.5 % (ref 11.5–17)
GLOBULIN SER-MCNC: 2.8 GM/DL (ref 2.4–3.5)
GLUCOSE (UA): NEGATIVE
GLUCOSE SERPL-MCNC: 172 MG/DL (ref 74–106)
HCT VFR BLD AUTO: 36.3 % (ref 37–47)
HGB BLD-MCNC: 10.8 GM/DL (ref 12–16)
HGB UR QL STRIP: NEGATIVE
KETONES UR QL STRIP: NEGATIVE
LEUKOCYTE ESTERASE UR QL STRIP: NEGATIVE
LIVER PROFILE INTERP: ABNORMAL
LYMPHOCYTES # BLD AUTO: 1.2 X10(3)/MCL (ref 0.6–4.6)
LYMPHOCYTES NFR BLD AUTO: 15 %
MCH RBC QN AUTO: 25.5 PG (ref 27–31)
MCHC RBC AUTO-ENTMCNC: 29.8 GM/DL (ref 33–36)
MCV RBC AUTO: 85.6 FL (ref 80–94)
MONOCYTES # BLD AUTO: 0.8 X10(3)/MCL (ref 0.1–1.3)
MONOCYTES NFR BLD AUTO: 10 %
NEUTROPHILS # BLD AUTO: 5.81 X10(3)/MCL (ref 2.1–9.2)
NEUTROPHILS NFR BLD AUTO: 72 %
NITRITE UR QL STRIP: NEGATIVE
PH UR STRIP: 7 [PH] (ref 5–9)
PLATELET # BLD AUTO: 259 X10(3)/MCL (ref 130–400)
PMV BLD AUTO: 11.1 FL (ref 9.4–12.4)
POTASSIUM SERPL-SCNC: 3.8 MMOL/L (ref 3.5–5.1)
PROT SERPL-MCNC: 6 GM/DL (ref 6.4–8.2)
PROT SERPL-MCNC: 6 GM/DL (ref 6.4–8.2)
PROT UR QL STRIP: NEGATIVE
RBC # BLD AUTO: 4.24 X10(6)/MCL (ref 4.2–5.4)
RBC #/AREA URNS HPF: NORMAL /[HPF]
SODIUM SERPL-SCNC: 142 MMOL/L (ref 136–145)
SP GR UR STRIP: 1.01 (ref 1–1.03)
SQUAMOUS EPITHELIAL, UA: NORMAL
UROBILINOGEN UR STRIP-ACNC: 1
WBC # SPEC AUTO: 8.1 X10(3)/MCL (ref 4.5–11.5)
WBC #/AREA URNS HPF: NORMAL /[HPF]

## 2019-03-21 LAB
ABS NEUT (OLG): 5 X10(3)/MCL (ref 2.1–9.2)
BASOPHILS # BLD AUTO: 0 X10(3)/MCL (ref 0–0.2)
BASOPHILS NFR BLD AUTO: 0 %
BNP BLD-MCNC: 126 PG/ML (ref 0–125)
BUN SERPL-MCNC: 21 MG/DL (ref 7–18)
CALCIUM SERPL-MCNC: 9 MG/DL (ref 8.5–10.1)
CHLORIDE SERPL-SCNC: 105 MMOL/L (ref 98–107)
CO2 SERPL-SCNC: 33 MMOL/L (ref 21–32)
CREAT SERPL-MCNC: 0.74 MG/DL (ref 0.55–1.02)
CREAT/UREA NIT SERPL: 28.4
EOSINOPHIL # BLD AUTO: 0.3 X10(3)/MCL (ref 0–0.9)
EOSINOPHIL NFR BLD AUTO: 3 %
ERYTHROCYTE [DISTWIDTH] IN BLOOD BY AUTOMATED COUNT: 16.1 % (ref 11.5–17)
GLUCOSE SERPL-MCNC: 69 MG/DL (ref 74–106)
HCT VFR BLD AUTO: 36.5 % (ref 37–47)
HGB BLD-MCNC: 11.2 GM/DL (ref 12–16)
LYMPHOCYTES # BLD AUTO: 1.5 X10(3)/MCL (ref 0.6–4.6)
LYMPHOCYTES NFR BLD AUTO: 20 %
MCH RBC QN AUTO: 25.5 PG (ref 27–31)
MCHC RBC AUTO-ENTMCNC: 30.7 GM/DL (ref 33–36)
MCV RBC AUTO: 83 FL (ref 80–94)
MONOCYTES # BLD AUTO: 0.9 X10(3)/MCL (ref 0.1–1.3)
MONOCYTES NFR BLD AUTO: 12 %
NEUTROPHILS # BLD AUTO: 5 X10(3)/MCL (ref 2.1–9.2)
NEUTROPHILS NFR BLD AUTO: 65 %
PLATELET # BLD AUTO: 268 X10(3)/MCL (ref 130–400)
PMV BLD AUTO: 10.7 FL (ref 9.4–12.4)
POTASSIUM SERPL-SCNC: 3.7 MMOL/L (ref 3.5–5.1)
RBC # BLD AUTO: 4.4 X10(6)/MCL (ref 4.2–5.4)
SODIUM SERPL-SCNC: 143 MMOL/L (ref 136–145)
WBC # SPEC AUTO: 7.7 X10(3)/MCL (ref 4.5–11.5)

## 2019-03-23 LAB
ALBUMIN SERPL-MCNC: 2.9 GM/DL (ref 3.4–5)
ALBUMIN/GLOB SERPL: 1 RATIO (ref 1.1–2)
ALP SERPL-CCNC: 47 UNIT/L (ref 38–126)
ALT SERPL-CCNC: 20 UNIT/L (ref 12–78)
AST SERPL-CCNC: 14 UNIT/L (ref 15–37)
BILIRUB SERPL-MCNC: 0.4 MG/DL (ref 0.2–1)
BILIRUBIN DIRECT+TOT PNL SERPL-MCNC: 0.2 MG/DL (ref 0–0.5)
BILIRUBIN DIRECT+TOT PNL SERPL-MCNC: 0.2 MG/DL (ref 0–0.8)
BUN SERPL-MCNC: 19 MG/DL (ref 7–18)
CALCIUM SERPL-MCNC: 8.9 MG/DL (ref 8.5–10.1)
CHLORIDE SERPL-SCNC: 106 MMOL/L (ref 98–107)
CO2 SERPL-SCNC: 32 MMOL/L (ref 21–32)
CREAT SERPL-MCNC: 0.82 MG/DL (ref 0.55–1.02)
GLOBULIN SER-MCNC: 2.8 GM/DL (ref 2.4–3.5)
GLUCOSE SERPL-MCNC: 107 MG/DL (ref 74–106)
POTASSIUM SERPL-SCNC: 4.1 MMOL/L (ref 3.5–5.1)
PROT SERPL-MCNC: 5.7 GM/DL (ref 6.4–8.2)
SODIUM SERPL-SCNC: 143 MMOL/L (ref 136–145)

## 2019-05-01 ENCOUNTER — HISTORICAL (OUTPATIENT)
Dept: ADMINISTRATIVE | Facility: HOSPITAL | Age: 84
End: 2019-05-01

## 2019-05-01 LAB
ALBUMIN SERPL-MCNC: 3.2 GM/DL (ref 3.4–5)
ALBUMIN/GLOB SERPL: 1.1 RATIO (ref 1.1–2)
ALP SERPL-CCNC: 50 UNIT/L (ref 38–126)
ALT SERPL-CCNC: 19 UNIT/L (ref 12–78)
AST SERPL-CCNC: 11 UNIT/L (ref 15–37)
BILIRUB SERPL-MCNC: 0.7 MG/DL (ref 0.2–1)
BILIRUBIN DIRECT+TOT PNL SERPL-MCNC: 0.2 MG/DL (ref 0–0.5)
BILIRUBIN DIRECT+TOT PNL SERPL-MCNC: 0.5 MG/DL (ref 0–0.8)
BUN SERPL-MCNC: 23 MG/DL (ref 7–18)
CALCIUM SERPL-MCNC: 9.2 MG/DL (ref 8.5–10.1)
CHLORIDE SERPL-SCNC: 105 MMOL/L (ref 98–107)
CO2 SERPL-SCNC: 32 MMOL/L (ref 21–32)
CREAT SERPL-MCNC: 0.88 MG/DL (ref 0.55–1.02)
EST. AVERAGE GLUCOSE BLD GHB EST-MCNC: 166 MG/DL
GLOBULIN SER-MCNC: 3 GM/DL (ref 2.4–3.5)
GLUCOSE SERPL-MCNC: 120 MG/DL (ref 74–106)
HBA1C MFR BLD: 7.4 % (ref 4.2–6.3)
POTASSIUM SERPL-SCNC: 4 MMOL/L (ref 3.5–5.1)
PROT SERPL-MCNC: 6.2 GM/DL (ref 6.4–8.2)
SODIUM SERPL-SCNC: 141 MMOL/L (ref 136–145)

## 2019-08-09 ENCOUNTER — HISTORICAL (OUTPATIENT)
Dept: ADMINISTRATIVE | Facility: HOSPITAL | Age: 84
End: 2019-08-09

## 2019-08-09 LAB
ABS NEUT (OLG): 5.56 X10(3)/MCL (ref 2.1–9.2)
ALBUMIN SERPL-MCNC: 3.2 GM/DL (ref 3.4–5)
ALBUMIN/GLOB SERPL: 1.2 RATIO (ref 1.1–2)
ALP SERPL-CCNC: 54 UNIT/L (ref 38–126)
ALT SERPL-CCNC: 19 UNIT/L (ref 12–78)
AST SERPL-CCNC: 10 UNIT/L (ref 15–37)
BASOPHILS # BLD AUTO: 0 X10(3)/MCL (ref 0–0.2)
BASOPHILS NFR BLD AUTO: 0 %
BILIRUB SERPL-MCNC: 0.5 MG/DL (ref 0.2–1)
BILIRUBIN DIRECT+TOT PNL SERPL-MCNC: 0.2 MG/DL (ref 0–0.5)
BILIRUBIN DIRECT+TOT PNL SERPL-MCNC: 0.3 MG/DL (ref 0–0.8)
BUN SERPL-MCNC: 20 MG/DL (ref 7–18)
CALCIUM SERPL-MCNC: 9.3 MG/DL (ref 8.5–10.1)
CHLORIDE SERPL-SCNC: 105 MMOL/L (ref 98–107)
CHOLEST SERPL-MCNC: 121 MG/DL (ref 0–200)
CHOLEST/HDLC SERPL: 3.4 {RATIO} (ref 0–4)
CK SERPL-CCNC: 31 UNIT/L (ref 26–192)
CO2 SERPL-SCNC: 34 MMOL/L (ref 21–32)
CREAT SERPL-MCNC: 0.81 MG/DL (ref 0.55–1.02)
EOSINOPHIL # BLD AUTO: 0.2 X10(3)/MCL (ref 0–0.9)
EOSINOPHIL NFR BLD AUTO: 2 %
ERYTHROCYTE [DISTWIDTH] IN BLOOD BY AUTOMATED COUNT: 16.6 % (ref 11.5–17)
EST. AVERAGE GLUCOSE BLD GHB EST-MCNC: 169 MG/DL
GLOBULIN SER-MCNC: 2.7 GM/DL (ref 2.4–3.5)
GLUCOSE SERPL-MCNC: 148 MG/DL (ref 74–106)
HBA1C MFR BLD: 7.5 % (ref 4.2–6.3)
HCT VFR BLD AUTO: 38.5 % (ref 37–47)
HDLC SERPL-MCNC: 36 MG/DL (ref 35–60)
HGB BLD-MCNC: 11.6 GM/DL (ref 12–16)
LDLC SERPL CALC-MCNC: 67 MG/DL (ref 0–129)
LYMPHOCYTES # BLD AUTO: 0.9 X10(3)/MCL (ref 0.6–4.6)
LYMPHOCYTES NFR BLD AUTO: 12 %
MCH RBC QN AUTO: 24.9 PG (ref 27–31)
MCHC RBC AUTO-ENTMCNC: 30.1 GM/DL (ref 33–36)
MCV RBC AUTO: 82.8 FL (ref 80–94)
MONOCYTES # BLD AUTO: 0.7 X10(3)/MCL (ref 0.1–1.3)
MONOCYTES NFR BLD AUTO: 9 %
NEUTROPHILS # BLD AUTO: 5.56 X10(3)/MCL (ref 2.1–9.2)
NEUTROPHILS NFR BLD AUTO: 76 %
PLATELET # BLD AUTO: 250 X10(3)/MCL (ref 130–400)
PMV BLD AUTO: 11.7 FL (ref 9.4–12.4)
POTASSIUM SERPL-SCNC: 3.7 MMOL/L (ref 3.5–5.1)
PROT SERPL-MCNC: 5.9 GM/DL (ref 6.4–8.2)
RBC # BLD AUTO: 4.65 X10(6)/MCL (ref 4.2–5.4)
SODIUM SERPL-SCNC: 145 MMOL/L (ref 136–145)
TRIGL SERPL-MCNC: 91 MG/DL (ref 30–150)
TSH SERPL-ACNC: 1.31 MIU/L (ref 0.36–3.74)
VLDLC SERPL CALC-MCNC: 18 MG/DL
WBC # SPEC AUTO: 7.4 X10(3)/MCL (ref 4.5–11.5)

## 2019-09-18 ENCOUNTER — HISTORICAL (OUTPATIENT)
Dept: RADIOLOGY | Facility: HOSPITAL | Age: 84
End: 2019-09-18

## 2019-11-11 ENCOUNTER — HOSPITAL ENCOUNTER (OUTPATIENT)
Dept: MEDSURG UNIT | Facility: HOSPITAL | Age: 84
End: 2019-11-14
Attending: INTERNAL MEDICINE | Admitting: INTERNAL MEDICINE

## 2019-11-11 LAB
ABS NEUT (OLG): 9.03 X10(3)/MCL (ref 2.1–9.2)
ALBUMIN SERPL-MCNC: 3.5 GM/DL (ref 3.4–5)
ALBUMIN/GLOB SERPL: 1.2 RATIO (ref 1.1–2)
ALP SERPL-CCNC: 53 UNIT/L (ref 38–126)
ALT SERPL-CCNC: 27 UNIT/L (ref 12–78)
APPEARANCE, UA: CLEAR
AST SERPL-CCNC: 24 UNIT/L (ref 15–37)
BACTERIA SPEC CULT: NORMAL /HPF
BASOPHILS # BLD AUTO: 0 X10(3)/MCL (ref 0–0.2)
BASOPHILS NFR BLD AUTO: 0 %
BILIRUB SERPL-MCNC: 1.2 MG/DL (ref 0.2–1)
BILIRUB UR QL STRIP: NEGATIVE
BILIRUBIN DIRECT+TOT PNL SERPL-MCNC: 0.5 MG/DL (ref 0–0.5)
BILIRUBIN DIRECT+TOT PNL SERPL-MCNC: 0.7 MG/DL (ref 0–0.8)
BUN SERPL-MCNC: 19 MG/DL (ref 7–18)
CALCIUM SERPL-MCNC: 9.6 MG/DL (ref 8.5–10.1)
CHLORIDE SERPL-SCNC: 104 MMOL/L (ref 98–107)
CO2 SERPL-SCNC: 32 MMOL/L (ref 21–32)
COLOR UR: YELLOW
CREAT SERPL-MCNC: 0.86 MG/DL (ref 0.55–1.02)
EOSINOPHIL # BLD AUTO: 0.1 X10(3)/MCL (ref 0–0.9)
EOSINOPHIL NFR BLD AUTO: 1 %
ERYTHROCYTE [DISTWIDTH] IN BLOOD BY AUTOMATED COUNT: 16.4 % (ref 11.5–17)
GLOBULIN SER-MCNC: 2.9 GM/DL (ref 2.4–3.5)
GLUCOSE (UA): NEGATIVE
GLUCOSE SERPL-MCNC: 144 MG/DL (ref 74–106)
HCT VFR BLD AUTO: 39.8 % (ref 37–47)
HGB BLD-MCNC: 12 GM/DL (ref 12–16)
HGB UR QL STRIP: NEGATIVE
IMM GRANULOCYTES # BLD AUTO: 0 10*3/UL
IMM GRANULOCYTES NFR BLD AUTO: 0 %
KETONES UR QL STRIP: NEGATIVE
LEUKOCYTE ESTERASE UR QL STRIP: NEGATIVE
LIPASE SERPL-CCNC: 32 UNIT/L (ref 73–393)
LYMPHOCYTES # BLD AUTO: 1 X10(3)/MCL (ref 0.6–4.6)
LYMPHOCYTES NFR BLD AUTO: 9 %
MCH RBC QN AUTO: 25.8 PG (ref 27–31)
MCHC RBC AUTO-ENTMCNC: 30.2 GM/DL (ref 33–36)
MCV RBC AUTO: 85.6 FL (ref 80–94)
MONOCYTES # BLD AUTO: 0.8 X10(3)/MCL (ref 0.1–1.3)
MONOCYTES NFR BLD AUTO: 7 %
NEUTROPHILS # BLD AUTO: 9.03 X10(3)/MCL (ref 2.1–9.2)
NEUTROPHILS NFR BLD AUTO: 82 %
NITRITE UR QL STRIP: NEGATIVE
PH UR STRIP: 8.5 [PH] (ref 5–9)
PLATELET # BLD AUTO: 307 X10(3)/MCL (ref 130–400)
PMV BLD AUTO: 11.5 FL (ref 9.4–12.4)
POC TROPONIN: 0.06 NG/ML (ref 0–0.08)
POTASSIUM SERPL-SCNC: 3.9 MMOL/L (ref 3.5–5.1)
PROT SERPL-MCNC: 6.4 GM/DL (ref 6.4–8.2)
PROT UR QL STRIP: NEGATIVE
RBC # BLD AUTO: 4.65 X10(6)/MCL (ref 4.2–5.4)
RBC #/AREA URNS HPF: NORMAL /[HPF]
SODIUM SERPL-SCNC: 141 MMOL/L (ref 136–145)
SP GR UR STRIP: 1.01 (ref 1–1.03)
SQUAMOUS EPITHELIAL, UA: NORMAL
UROBILINOGEN UR STRIP-ACNC: 1
WBC # SPEC AUTO: 11 X10(3)/MCL (ref 4.5–11.5)
WBC #/AREA URNS HPF: NORMAL /[HPF]

## 2019-11-12 LAB — C DIFF INTERP: NEGATIVE

## 2019-11-14 LAB
ABS NEUT (OLG): 7.14 X10(3)/MCL (ref 2.1–9.2)
ALBUMIN SERPL-MCNC: 3.1 GM/DL (ref 3.4–5)
ALBUMIN/GLOB SERPL: 1.1 RATIO (ref 1.1–2)
ALP SERPL-CCNC: 58 UNIT/L (ref 38–126)
ALT SERPL-CCNC: 31 UNIT/L (ref 12–78)
AST SERPL-CCNC: 21 UNIT/L (ref 15–37)
BASOPHILS # BLD AUTO: 0 X10(3)/MCL (ref 0–0.2)
BASOPHILS NFR BLD AUTO: 0 %
BILIRUB SERPL-MCNC: 0.9 MG/DL (ref 0.2–1)
BILIRUBIN DIRECT+TOT PNL SERPL-MCNC: 0.4 MG/DL (ref 0–0.8)
BILIRUBIN DIRECT+TOT PNL SERPL-MCNC: 0.5 MG/DL (ref 0–0.5)
BUN SERPL-MCNC: 21 MG/DL (ref 7–18)
CALCIUM SERPL-MCNC: 8.9 MG/DL (ref 8.5–10.1)
CHLORIDE SERPL-SCNC: 105 MMOL/L (ref 98–107)
CO2 SERPL-SCNC: 31 MMOL/L (ref 21–32)
CREAT SERPL-MCNC: 0.99 MG/DL (ref 0.55–1.02)
EOSINOPHIL # BLD AUTO: 0.2 X10(3)/MCL (ref 0–0.9)
EOSINOPHIL NFR BLD AUTO: 2 %
ERYTHROCYTE [DISTWIDTH] IN BLOOD BY AUTOMATED COUNT: 15.6 % (ref 11.5–17)
FINAL CULTURE: NORMAL
GLOBULIN SER-MCNC: 2.8 GM/DL (ref 2.4–3.5)
GLUCOSE SERPL-MCNC: 125 MG/DL (ref 74–106)
HCT VFR BLD AUTO: 38.9 % (ref 37–47)
HGB BLD-MCNC: 11.6 GM/DL (ref 12–16)
LYMPHOCYTES # BLD AUTO: 0.8 X10(3)/MCL (ref 0.6–4.6)
LYMPHOCYTES NFR BLD AUTO: 9 %
MCH RBC QN AUTO: 25.7 PG (ref 27–31)
MCHC RBC AUTO-ENTMCNC: 29.8 GM/DL (ref 33–36)
MCV RBC AUTO: 86.1 FL (ref 80–94)
MONOCYTES # BLD AUTO: 0.8 X10(3)/MCL (ref 0.1–1.3)
MONOCYTES NFR BLD AUTO: 8 %
NEUTROPHILS # BLD AUTO: 7.14 X10(3)/MCL (ref 2.1–9.2)
NEUTROPHILS NFR BLD AUTO: 80 %
PLATELET # BLD AUTO: 274 X10(3)/MCL (ref 130–400)
PMV BLD AUTO: 11.6 FL (ref 9.4–12.4)
POTASSIUM SERPL-SCNC: 4 MMOL/L (ref 3.5–5.1)
PROT SERPL-MCNC: 5.9 GM/DL (ref 6.4–8.2)
RBC # BLD AUTO: 4.52 X10(6)/MCL (ref 4.2–5.4)
SODIUM SERPL-SCNC: 142 MMOL/L (ref 136–145)
WBC # SPEC AUTO: 9 X10(3)/MCL (ref 4.5–11.5)

## 2019-11-19 ENCOUNTER — HISTORICAL (OUTPATIENT)
Dept: ADMINISTRATIVE | Facility: HOSPITAL | Age: 84
End: 2019-11-19

## 2019-11-19 LAB — TSH SERPL-ACNC: 2.07 MIU/L (ref 0.36–3.74)

## 2019-12-17 ENCOUNTER — HISTORICAL (OUTPATIENT)
Dept: ADMINISTRATIVE | Facility: HOSPITAL | Age: 84
End: 2019-12-17

## 2019-12-17 LAB
ABS NEUT (OLG): 6.66 X10(3)/MCL (ref 2.1–9.2)
ALBUMIN SERPL-MCNC: 3.3 GM/DL (ref 3.4–5)
ALBUMIN/GLOB SERPL: 1.1 RATIO (ref 1.1–2)
ALP SERPL-CCNC: 55 UNIT/L (ref 38–126)
ALT SERPL-CCNC: 21 UNIT/L (ref 12–78)
AST SERPL-CCNC: 9 UNIT/L (ref 15–37)
BASOPHILS # BLD AUTO: 0 X10(3)/MCL (ref 0–0.2)
BASOPHILS NFR BLD AUTO: 0 %
BILIRUB SERPL-MCNC: 0.8 MG/DL (ref 0.2–1)
BILIRUBIN DIRECT+TOT PNL SERPL-MCNC: 0.3 MG/DL (ref 0–0.5)
BILIRUBIN DIRECT+TOT PNL SERPL-MCNC: 0.5 MG/DL (ref 0–0.8)
BUN SERPL-MCNC: 19 MG/DL (ref 7–18)
CALCIUM SERPL-MCNC: 9.8 MG/DL (ref 8.5–10.1)
CHLORIDE SERPL-SCNC: 107 MMOL/L (ref 98–107)
CO2 SERPL-SCNC: 29 MMOL/L (ref 21–32)
CREAT SERPL-MCNC: 0.76 MG/DL (ref 0.55–1.02)
EOSINOPHIL # BLD AUTO: 0.1 X10(3)/MCL (ref 0–0.9)
EOSINOPHIL NFR BLD AUTO: 1 %
ERYTHROCYTE [DISTWIDTH] IN BLOOD BY AUTOMATED COUNT: 16.2 % (ref 11.5–17)
EST. AVERAGE GLUCOSE BLD GHB EST-MCNC: 169 MG/DL
GLOBULIN SER-MCNC: 3.1 GM/DL (ref 2.4–3.5)
GLUCOSE SERPL-MCNC: 149 MG/DL (ref 74–106)
HBA1C MFR BLD: 7.5 % (ref 4.2–6.3)
HCT VFR BLD AUTO: 38.1 % (ref 37–47)
HGB BLD-MCNC: 11.7 GM/DL (ref 12–16)
LYMPHOCYTES # BLD AUTO: 1 X10(3)/MCL (ref 0.6–4.6)
LYMPHOCYTES NFR BLD AUTO: 12 %
MCH RBC QN AUTO: 25.5 PG (ref 27–31)
MCHC RBC AUTO-ENTMCNC: 30.7 GM/DL (ref 33–36)
MCV RBC AUTO: 83 FL (ref 80–94)
MONOCYTES # BLD AUTO: 0.6 X10(3)/MCL (ref 0.1–1.3)
MONOCYTES NFR BLD AUTO: 8 %
NEUTROPHILS # BLD AUTO: 6.66 X10(3)/MCL (ref 2.1–9.2)
NEUTROPHILS NFR BLD AUTO: 79 %
PLATELET # BLD AUTO: 275 X10(3)/MCL (ref 130–400)
PMV BLD AUTO: 11.6 FL (ref 9.4–12.4)
POTASSIUM SERPL-SCNC: 4 MMOL/L (ref 3.5–5.1)
PROT SERPL-MCNC: 6.4 GM/DL (ref 6.4–8.2)
RBC # BLD AUTO: 4.59 X10(6)/MCL (ref 4.2–5.4)
SODIUM SERPL-SCNC: 143 MMOL/L (ref 136–145)
WBC # SPEC AUTO: 8.4 X10(3)/MCL (ref 4.5–11.5)

## 2020-02-12 ENCOUNTER — HISTORICAL (OUTPATIENT)
Dept: ADMINISTRATIVE | Facility: HOSPITAL | Age: 85
End: 2020-02-12

## 2020-02-12 LAB
BUN SERPL-MCNC: 20 MG/DL (ref 7–18)
CALCIUM SERPL-MCNC: 9.2 MG/DL (ref 8.5–10.1)
CHLORIDE SERPL-SCNC: 108 MMOL/L (ref 98–107)
CO2 SERPL-SCNC: 30 MMOL/L (ref 21–32)
CREAT SERPL-MCNC: 0.63 MG/DL (ref 0.55–1.02)
CREAT/UREA NIT SERPL: 31.7
GLUCOSE SERPL-MCNC: 93 MG/DL (ref 74–106)
POTASSIUM SERPL-SCNC: 3.6 MMOL/L (ref 3.5–5.1)
SODIUM SERPL-SCNC: 143 MMOL/L (ref 136–145)

## 2020-05-01 ENCOUNTER — HISTORICAL (OUTPATIENT)
Dept: ADMINISTRATIVE | Facility: HOSPITAL | Age: 85
End: 2020-05-01

## 2020-05-01 LAB
ABS NEUT (OLG): 5.66 X10(3)/MCL (ref 2.1–9.2)
ALBUMIN SERPL-MCNC: 3.4 GM/DL (ref 3.4–5)
ALBUMIN/GLOB SERPL: 1.2 RATIO (ref 1.1–2)
ALP SERPL-CCNC: 60 UNIT/L (ref 40–150)
ALT SERPL-CCNC: 14 UNIT/L (ref 0–55)
APPEARANCE, UA: CLEAR
AST SERPL-CCNC: 16 UNIT/L (ref 5–34)
BACTERIA SPEC CULT: ABNORMAL /HPF
BASOPHILS # BLD AUTO: 0 X10(3)/MCL (ref 0–0.2)
BASOPHILS NFR BLD AUTO: 1 %
BILIRUB SERPL-MCNC: 0.9 MG/DL
BILIRUB UR QL STRIP: NEGATIVE
BILIRUBIN DIRECT+TOT PNL SERPL-MCNC: 0.4 MG/DL (ref 0–0.5)
BILIRUBIN DIRECT+TOT PNL SERPL-MCNC: 0.5 MG/DL (ref 0–0.8)
BUN SERPL-MCNC: 23 MG/DL (ref 9.8–20.1)
CALCIUM SERPL-MCNC: 9 MG/DL (ref 8.4–10.2)
CHLORIDE SERPL-SCNC: 103 MMOL/L (ref 98–107)
CHOLEST SERPL-MCNC: 128 MG/DL
CHOLEST/HDLC SERPL: 4 {RATIO} (ref 0–5)
CK SERPL-CCNC: 35 U/L (ref 29–168)
CO2 SERPL-SCNC: 36 MMOL/L (ref 23–31)
COLOR UR: YELLOW
CREAT SERPL-MCNC: 0.75 MG/DL (ref 0.55–1.02)
EOSINOPHIL # BLD AUTO: 0.2 X10(3)/MCL (ref 0–0.9)
EOSINOPHIL NFR BLD AUTO: 2 %
ERYTHROCYTE [DISTWIDTH] IN BLOOD BY AUTOMATED COUNT: 15.6 % (ref 11.5–17)
EST. AVERAGE GLUCOSE BLD GHB EST-MCNC: 171.4 MG/DL
GLOBULIN SER-MCNC: 2.8 GM/DL (ref 2.4–3.5)
GLUCOSE (UA): NEGATIVE
GLUCOSE SERPL-MCNC: 122 MG/DL (ref 82–115)
HBA1C MFR BLD: 7.6 %
HCT VFR BLD AUTO: 38.3 % (ref 37–47)
HDLC SERPL-MCNC: 33 MG/DL (ref 35–60)
HGB BLD-MCNC: 11.7 GM/DL (ref 12–16)
HGB UR QL STRIP: NEGATIVE
KETONES UR QL STRIP: NEGATIVE
LDLC SERPL CALC-MCNC: 79 MG/DL (ref 50–140)
LEUKOCYTE ESTERASE UR QL STRIP: ABNORMAL
LYMPHOCYTES # BLD AUTO: 1.1 X10(3)/MCL (ref 0.6–4.6)
LYMPHOCYTES NFR BLD AUTO: 14 %
MCH RBC QN AUTO: 26.1 PG (ref 27–31)
MCHC RBC AUTO-ENTMCNC: 30.5 GM/DL (ref 33–36)
MCV RBC AUTO: 85.3 FL (ref 80–94)
MONOCYTES # BLD AUTO: 1 X10(3)/MCL (ref 0.1–1.3)
MONOCYTES NFR BLD AUTO: 13 %
NEUTROPHILS # BLD AUTO: 5.66 X10(3)/MCL (ref 2.1–9.2)
NEUTROPHILS NFR BLD AUTO: 70 %
NITRITE UR QL STRIP: NEGATIVE
PH UR STRIP: 6 [PH] (ref 5–9)
PLATELET # BLD AUTO: 291 X10(3)/MCL (ref 130–400)
PMV BLD AUTO: 11.4 FL (ref 9.4–12.4)
POTASSIUM SERPL-SCNC: 4 MMOL/L (ref 3.5–5.1)
PROT SERPL-MCNC: 6.2 GM/DL (ref 5.8–7.6)
PROT UR QL STRIP: NEGATIVE
RBC # BLD AUTO: 4.49 X10(6)/MCL (ref 4.2–5.4)
RBC #/AREA URNS HPF: 5 /HPF (ref 0–2)
SODIUM SERPL-SCNC: 146 MMOL/L (ref 136–145)
SP GR UR STRIP: 1.01 (ref 1–1.03)
SQUAMOUS EPITHELIAL, UA: ABNORMAL
TRIGL SERPL-MCNC: 78 MG/DL (ref 37–140)
TSH SERPL-ACNC: 1.98 UIU/ML (ref 0.35–4.94)
UROBILINOGEN UR STRIP-ACNC: 1
VLDLC SERPL CALC-MCNC: 16 MG/DL
WBC # SPEC AUTO: 8.1 X10(3)/MCL (ref 4.5–11.5)
WBC #/AREA URNS HPF: ABNORMAL /[HPF]

## 2020-07-17 ENCOUNTER — HISTORICAL (OUTPATIENT)
Dept: ADMINISTRATIVE | Facility: HOSPITAL | Age: 85
End: 2020-07-17

## 2020-07-17 LAB
ABS NEUT (OLG): 5.72 X10(3)/MCL (ref 2.1–9.2)
ALBUMIN SERPL-MCNC: 3.4 GM/DL (ref 3.4–4.8)
ALBUMIN/GLOB SERPL: 1.3 RATIO (ref 1.1–2)
ALP SERPL-CCNC: 45 UNIT/L (ref 40–150)
ALT SERPL-CCNC: 13 UNIT/L (ref 0–55)
APPEARANCE, UA: CLEAR
AST SERPL-CCNC: 15 UNIT/L (ref 5–34)
BACTERIA SPEC CULT: ABNORMAL /HPF
BASOPHILS # BLD AUTO: 0 X10(3)/MCL (ref 0–0.2)
BASOPHILS NFR BLD AUTO: 0 %
BILIRUB SERPL-MCNC: 0.7 MG/DL
BILIRUB UR QL STRIP: NEGATIVE
BILIRUBIN DIRECT+TOT PNL SERPL-MCNC: 0.3 MG/DL (ref 0–0.8)
BILIRUBIN DIRECT+TOT PNL SERPL-MCNC: 0.4 MG/DL (ref 0–0.5)
BUN SERPL-MCNC: 20.4 MG/DL (ref 9.8–20.1)
CALCIUM SERPL-MCNC: 9 MG/DL (ref 8.4–10.2)
CHLORIDE SERPL-SCNC: 104 MMOL/L (ref 98–107)
CHOLEST SERPL-MCNC: 145 MG/DL
CHOLEST/HDLC SERPL: 4 {RATIO} (ref 0–5)
CK SERPL-CCNC: 27 U/L (ref 29–168)
CO2 SERPL-SCNC: 32 MMOL/L (ref 23–31)
COLOR UR: YELLOW
CREAT SERPL-MCNC: 0.75 MG/DL (ref 0.55–1.02)
EOSINOPHIL # BLD AUTO: 0.1 X10(3)/MCL (ref 0–0.9)
EOSINOPHIL NFR BLD AUTO: 2 %
ERYTHROCYTE [DISTWIDTH] IN BLOOD BY AUTOMATED COUNT: 16.5 % (ref 11.5–17)
EST. AVERAGE GLUCOSE BLD GHB EST-MCNC: 162.8 MG/DL
GLOBULIN SER-MCNC: 2.7 GM/DL (ref 2.4–3.5)
GLUCOSE (UA): NEGATIVE
GLUCOSE SERPL-MCNC: 125 MG/DL (ref 82–115)
HBA1C MFR BLD: 7.3 %
HCT VFR BLD AUTO: 39.3 % (ref 37–47)
HDLC SERPL-MCNC: 36 MG/DL (ref 35–60)
HGB BLD-MCNC: 11.9 GM/DL (ref 12–16)
HGB UR QL STRIP: NEGATIVE
KETONES UR QL STRIP: NEGATIVE
LDLC SERPL CALC-MCNC: 94 MG/DL (ref 50–140)
LEUKOCYTE ESTERASE UR QL STRIP: ABNORMAL
LYMPHOCYTES # BLD AUTO: 0.8 X10(3)/MCL (ref 0.6–4.6)
LYMPHOCYTES NFR BLD AUTO: 11 %
MCH RBC QN AUTO: 26.4 PG (ref 27–31)
MCHC RBC AUTO-ENTMCNC: 30.3 GM/DL (ref 33–36)
MCV RBC AUTO: 87.1 FL (ref 80–94)
MONOCYTES # BLD AUTO: 0.6 X10(3)/MCL (ref 0.1–1.3)
MONOCYTES NFR BLD AUTO: 8 %
NEUTROPHILS # BLD AUTO: 5.72 X10(3)/MCL (ref 2.1–9.2)
NEUTROPHILS NFR BLD AUTO: 78 %
NITRITE UR QL STRIP: NEGATIVE
PH UR STRIP: 6.5 [PH] (ref 5–9)
PLATELET # BLD AUTO: 279 X10(3)/MCL (ref 130–400)
PMV BLD AUTO: 11 FL (ref 9.4–12.4)
POTASSIUM SERPL-SCNC: 3.9 MMOL/L (ref 3.5–5.1)
PROT SERPL-MCNC: 6.1 GM/DL (ref 5.8–7.6)
PROT UR QL STRIP: NEGATIVE
RBC # BLD AUTO: 4.51 X10(6)/MCL (ref 4.2–5.4)
RBC #/AREA URNS HPF: ABNORMAL /[HPF]
SODIUM SERPL-SCNC: 144 MMOL/L (ref 136–145)
SP GR UR STRIP: 1.01 (ref 1–1.03)
SQUAMOUS EPITHELIAL, UA: ABNORMAL
TRIGL SERPL-MCNC: 77 MG/DL (ref 37–140)
TSH SERPL-ACNC: 1.07 UIU/ML (ref 0.35–4.94)
UROBILINOGEN UR STRIP-ACNC: 0.2
VLDLC SERPL CALC-MCNC: 15 MG/DL
WBC # SPEC AUTO: 7.4 X10(3)/MCL (ref 4.5–11.5)
WBC #/AREA URNS HPF: ABNORMAL /[HPF]

## 2020-09-16 ENCOUNTER — HISTORICAL (OUTPATIENT)
Dept: ADMINISTRATIVE | Facility: HOSPITAL | Age: 85
End: 2020-09-16

## 2020-09-16 LAB
ABS NEUT (OLG): 4.94 X10(3)/MCL (ref 2.1–9.2)
ALBUMIN SERPL-MCNC: 3.4 GM/DL (ref 3.4–5)
ALBUMIN/GLOB SERPL: 1.4 RATIO (ref 1.1–2)
ALP SERPL-CCNC: 44 UNIT/L (ref 40–150)
ALT SERPL-CCNC: 12 UNIT/L (ref 0–55)
AST SERPL-CCNC: 15 UNIT/L (ref 5–34)
BASOPHILS # BLD AUTO: 0 X10(3)/MCL (ref 0–0.2)
BASOPHILS NFR BLD AUTO: 1 %
BILIRUB SERPL-MCNC: 0.6 MG/DL
BILIRUBIN DIRECT+TOT PNL SERPL-MCNC: 0.3 MG/DL (ref 0–0.5)
BILIRUBIN DIRECT+TOT PNL SERPL-MCNC: 0.3 MG/DL (ref 0–0.8)
BUN SERPL-MCNC: 16.6 MG/DL (ref 9.8–20.1)
CALCIUM SERPL-MCNC: 9.5 MG/DL (ref 8.4–10.2)
CHLORIDE SERPL-SCNC: 105 MMOL/L (ref 98–107)
CO2 SERPL-SCNC: 34 MMOL/L (ref 23–31)
CREAT SERPL-MCNC: 0.81 MG/DL (ref 0.55–1.02)
EOSINOPHIL # BLD AUTO: 0.2 X10(3)/MCL (ref 0–0.9)
EOSINOPHIL NFR BLD AUTO: 3 %
ERYTHROCYTE [DISTWIDTH] IN BLOOD BY AUTOMATED COUNT: 15.9 % (ref 11.5–17)
EST. AVERAGE GLUCOSE BLD GHB EST-MCNC: 174.3 MG/DL
GLOBULIN SER-MCNC: 2.4 GM/DL (ref 2.4–3.5)
GLUCOSE SERPL-MCNC: 125 MG/DL (ref 82–115)
HBA1C MFR BLD: 7.7 %
HCT VFR BLD AUTO: 37.1 % (ref 37–47)
HGB BLD-MCNC: 11.6 GM/DL (ref 12–16)
LYMPHOCYTES # BLD AUTO: 1 X10(3)/MCL (ref 0.6–4.6)
LYMPHOCYTES NFR BLD AUTO: 15 %
MCH RBC QN AUTO: 26.6 PG (ref 27–31)
MCHC RBC AUTO-ENTMCNC: 31.3 GM/DL (ref 33–36)
MCV RBC AUTO: 85.1 FL (ref 80–94)
MONOCYTES # BLD AUTO: 0.7 X10(3)/MCL (ref 0.1–1.3)
MONOCYTES NFR BLD AUTO: 10 %
NEUTROPHILS # BLD AUTO: 4.94 X10(3)/MCL (ref 2.1–9.2)
NEUTROPHILS NFR BLD AUTO: 72 %
PLATELET # BLD AUTO: 273 X10(3)/MCL (ref 130–400)
PMV BLD AUTO: 11 FL (ref 9.4–12.4)
POTASSIUM SERPL-SCNC: 3.9 MMOL/L (ref 3.5–5.1)
PROT SERPL-MCNC: 5.8 GM/DL (ref 5.8–7.6)
RBC # BLD AUTO: 4.36 X10(6)/MCL (ref 4.2–5.4)
SODIUM SERPL-SCNC: 143 MMOL/L (ref 136–145)
WBC # SPEC AUTO: 6.9 X10(3)/MCL (ref 4.5–11.5)

## 2020-10-27 ENCOUNTER — HISTORICAL (OUTPATIENT)
Dept: ADMINISTRATIVE | Facility: HOSPITAL | Age: 85
End: 2020-10-27

## 2020-10-27 LAB
ABS NEUT (OLG): 6.47 X10(3)/MCL (ref 2.1–9.2)
ALBUMIN SERPL-MCNC: 3.6 GM/DL (ref 3.4–5)
ALBUMIN/GLOB SERPL: 1.3 RATIO (ref 1.1–2)
ALP SERPL-CCNC: 50 UNIT/L (ref 40–150)
ALT SERPL-CCNC: 13 UNIT/L (ref 0–55)
APPEARANCE, UA: CLEAR
AST SERPL-CCNC: 14 UNIT/L (ref 5–34)
BACTERIA SPEC CULT: ABNORMAL /HPF
BASOPHILS # BLD AUTO: 0 X10(3)/MCL (ref 0–0.2)
BASOPHILS NFR BLD AUTO: 0 %
BILIRUB SERPL-MCNC: 0.6 MG/DL
BILIRUB UR QL STRIP: NEGATIVE
BILIRUBIN DIRECT+TOT PNL SERPL-MCNC: 0.2 MG/DL (ref 0–0.5)
BILIRUBIN DIRECT+TOT PNL SERPL-MCNC: 0.4 MG/DL (ref 0–0.8)
BUN SERPL-MCNC: 19.2 MG/DL (ref 9.8–20.1)
CALCIUM SERPL-MCNC: 9.3 MG/DL (ref 8.4–10.2)
CHLORIDE SERPL-SCNC: 103 MMOL/L (ref 98–107)
CHOLEST SERPL-MCNC: 157 MG/DL
CHOLEST/HDLC SERPL: 4 {RATIO} (ref 0–5)
CK SERPL-CCNC: 29 U/L (ref 29–168)
CO2 SERPL-SCNC: 30 MMOL/L (ref 23–31)
COLOR UR: YELLOW
CREAT SERPL-MCNC: 0.77 MG/DL (ref 0.55–1.02)
EOSINOPHIL # BLD AUTO: 0.1 X10(3)/MCL (ref 0–0.9)
EOSINOPHIL NFR BLD AUTO: 2 %
ERYTHROCYTE [DISTWIDTH] IN BLOOD BY AUTOMATED COUNT: 15.8 % (ref 11.5–17)
EST. AVERAGE GLUCOSE BLD GHB EST-MCNC: 174.3 MG/DL
GLOBULIN SER-MCNC: 2.7 GM/DL (ref 2.4–3.5)
GLUCOSE (UA): NEGATIVE
GLUCOSE SERPL-MCNC: 123 MG/DL (ref 82–115)
HBA1C MFR BLD: 7.7 %
HCT VFR BLD AUTO: 37.1 % (ref 37–47)
HDLC SERPL-MCNC: 38 MG/DL (ref 35–60)
HGB BLD-MCNC: 11.6 GM/DL (ref 12–16)
HGB UR QL STRIP: NEGATIVE
KETONES UR QL STRIP: NEGATIVE
LDLC SERPL CALC-MCNC: 100 MG/DL (ref 50–140)
LEUKOCYTE ESTERASE UR QL STRIP: ABNORMAL
LYMPHOCYTES # BLD AUTO: 1.2 X10(3)/MCL (ref 0.6–4.6)
LYMPHOCYTES NFR BLD AUTO: 14 %
MCH RBC QN AUTO: 26.7 PG (ref 27–31)
MCHC RBC AUTO-ENTMCNC: 31.3 GM/DL (ref 33–36)
MCV RBC AUTO: 85.5 FL (ref 80–94)
MONOCYTES # BLD AUTO: 0.7 X10(3)/MCL (ref 0.1–1.3)
MONOCYTES NFR BLD AUTO: 8 %
NEUTROPHILS # BLD AUTO: 6.47 X10(3)/MCL (ref 2.1–9.2)
NEUTROPHILS NFR BLD AUTO: 75 %
NITRITE UR QL STRIP: NEGATIVE
PH UR STRIP: 6.5 [PH] (ref 5–9)
PLATELET # BLD AUTO: 256 X10(3)/MCL (ref 130–400)
PMV BLD AUTO: 11.3 FL (ref 9.4–12.4)
POTASSIUM SERPL-SCNC: 4.5 MMOL/L (ref 3.5–5.1)
PROT SERPL-MCNC: 6.3 GM/DL (ref 5.8–7.6)
PROT UR QL STRIP: NEGATIVE
RBC # BLD AUTO: 4.34 X10(6)/MCL (ref 4.2–5.4)
RBC #/AREA URNS HPF: ABNORMAL /[HPF]
SODIUM SERPL-SCNC: 143 MMOL/L (ref 136–145)
SP GR UR STRIP: 1.01 (ref 1–1.03)
SQUAMOUS EPITHELIAL, UA: ABNORMAL
TRIGL SERPL-MCNC: 94 MG/DL (ref 37–140)
TSH SERPL-ACNC: 0.93 UIU/ML (ref 0.35–4.94)
UROBILINOGEN UR STRIP-ACNC: 0.2
VLDLC SERPL CALC-MCNC: 19 MG/DL
WBC # SPEC AUTO: 8.6 X10(3)/MCL (ref 4.5–11.5)
WBC #/AREA URNS HPF: ABNORMAL /[HPF]

## 2020-12-30 ENCOUNTER — HISTORICAL (OUTPATIENT)
Dept: ADMINISTRATIVE | Facility: HOSPITAL | Age: 85
End: 2020-12-30

## 2020-12-30 LAB — SARS-COV-2 RNA RESP QL NAA+PROBE: DETECTED

## 2021-01-15 ENCOUNTER — HISTORICAL (OUTPATIENT)
Dept: ADMINISTRATIVE | Facility: HOSPITAL | Age: 86
End: 2021-01-15

## 2021-01-15 LAB
ABS NEUT (OLG): 5.21 X10(3)/MCL (ref 2.1–9.2)
ALBUMIN SERPL-MCNC: 3.8 GM/DL (ref 3.4–4.8)
ALP SERPL-CCNC: 56 UNIT/L (ref 40–150)
ALT SERPL-CCNC: 15 UNIT/L (ref 0–55)
AST SERPL-CCNC: 16 UNIT/L (ref 5–34)
BASOPHILS # BLD AUTO: 0 X10(3)/MCL (ref 0–0.2)
BASOPHILS NFR BLD AUTO: 0 %
BILIRUB SERPL-MCNC: 0.5 MG/DL
BILIRUBIN DIRECT+TOT PNL SERPL-MCNC: 0.2 MG/DL (ref 0–0.5)
BILIRUBIN DIRECT+TOT PNL SERPL-MCNC: 0.3 MG/DL (ref 0–0.8)
BUN SERPL-MCNC: 17.1 MG/DL (ref 9.8–20.1)
CALCIUM SERPL-MCNC: 9.1 MG/DL (ref 8.4–10.2)
CHLORIDE SERPL-SCNC: 101 MMOL/L (ref 98–107)
CHOLEST SERPL-MCNC: 197 MG/DL
CHOLEST/HDLC SERPL: 5 {RATIO} (ref 0–5)
CK SERPL-CCNC: 26 U/L (ref 29–168)
CO2 SERPL-SCNC: 34 MMOL/L (ref 23–31)
CREAT SERPL-MCNC: 0.79 MG/DL (ref 0.55–1.02)
CREAT/UREA NIT SERPL: 22
CRP SERPL HS-MCNC: 0.28 MG/DL
EOSINOPHIL # BLD AUTO: 0.1 X10(3)/MCL (ref 0–0.9)
EOSINOPHIL NFR BLD AUTO: 1 %
ERYTHROCYTE [DISTWIDTH] IN BLOOD BY AUTOMATED COUNT: 14.6 % (ref 11.5–17)
FERRITIN SERPL-MCNC: 66.44 NG/ML (ref 4.63–204)
GLUCOSE SERPL-MCNC: 172 MG/DL (ref 82–115)
HCT VFR BLD AUTO: 38.7 % (ref 37–47)
HDLC SERPL-MCNC: 41 MG/DL (ref 35–60)
HGB BLD-MCNC: 12.2 GM/DL (ref 12–16)
LDH SERPL-CCNC: 181 UNIT/L (ref 140–271)
LDLC SERPL CALC-MCNC: 122 MG/DL (ref 50–140)
LIVER PROFILE INTERP: NORMAL
LYMPHOCYTES # BLD AUTO: 1 X10(3)/MCL (ref 0.6–4.6)
LYMPHOCYTES NFR BLD AUTO: 15 %
MCH RBC QN AUTO: 27.7 PG (ref 27–31)
MCHC RBC AUTO-ENTMCNC: 31.5 GM/DL (ref 33–36)
MCV RBC AUTO: 88 FL (ref 80–94)
MONOCYTES # BLD AUTO: 0.7 X10(3)/MCL (ref 0.1–1.3)
MONOCYTES NFR BLD AUTO: 10 %
NEUTROPHILS # BLD AUTO: 5.21 X10(3)/MCL (ref 2.1–9.2)
NEUTROPHILS NFR BLD AUTO: 74 %
PLATELET # BLD AUTO: 249 X10(3)/MCL (ref 130–400)
PMV BLD AUTO: 11.8 FL (ref 9.4–12.4)
POTASSIUM SERPL-SCNC: 3.8 MMOL/L (ref 3.5–5.1)
PROT SERPL-MCNC: 6.5 GM/DL (ref 5.8–7.6)
RBC # BLD AUTO: 4.4 X10(6)/MCL (ref 4.2–5.4)
SODIUM SERPL-SCNC: 143 MMOL/L (ref 136–145)
TRIGL SERPL-MCNC: 170 MG/DL (ref 37–140)
TSH SERPL-ACNC: 1.27 UIU/ML (ref 0.35–4.94)
VLDLC SERPL CALC-MCNC: 34 MG/DL
WBC # SPEC AUTO: 7 X10(3)/MCL (ref 4.5–11.5)

## 2021-02-05 ENCOUNTER — HISTORICAL (OUTPATIENT)
Dept: ADMINISTRATIVE | Facility: HOSPITAL | Age: 86
End: 2021-02-05

## 2021-02-05 LAB
ALBUMIN SERPL-MCNC: 3.9 GM/DL (ref 3.4–4.8)
ALBUMIN/GLOB SERPL: 1.5 RATIO (ref 1.1–2)
ALP SERPL-CCNC: 52 UNIT/L (ref 40–150)
ALT SERPL-CCNC: 14 UNIT/L (ref 0–55)
AST SERPL-CCNC: 15 UNIT/L (ref 5–34)
BILIRUB SERPL-MCNC: 0.6 MG/DL
BILIRUBIN DIRECT+TOT PNL SERPL-MCNC: 0.3 MG/DL (ref 0–0.5)
BILIRUBIN DIRECT+TOT PNL SERPL-MCNC: 0.3 MG/DL (ref 0–0.8)
BUN SERPL-MCNC: 28.4 MG/DL (ref 9.8–20.1)
CALCIUM SERPL-MCNC: 9.5 MG/DL (ref 8.4–10.2)
CHLORIDE SERPL-SCNC: 100 MMOL/L (ref 98–107)
CO2 SERPL-SCNC: 34 MMOL/L (ref 23–31)
CREAT SERPL-MCNC: 0.82 MG/DL (ref 0.55–1.02)
CRP SERPL-MCNC: 0.41 MG/DL
ERYTHROCYTE [SEDIMENTATION RATE] IN BLOOD: 18 MM/HR (ref 0–20)
FERRITIN SERPL-MCNC: 64.54 NG/ML (ref 4.63–204)
GLOBULIN SER-MCNC: 2.6 GM/DL (ref 2.4–3.5)
GLUCOSE SERPL-MCNC: 177 MG/DL (ref 82–115)
POTASSIUM SERPL-SCNC: 4.1 MMOL/L (ref 3.5–5.1)
PROT SERPL-MCNC: 6.5 GM/DL (ref 5.8–7.6)
SODIUM SERPL-SCNC: 139 MMOL/L (ref 136–145)

## 2021-03-05 ENCOUNTER — HISTORICAL (OUTPATIENT)
Dept: ADMINISTRATIVE | Facility: HOSPITAL | Age: 86
End: 2021-03-05

## 2021-03-05 LAB
BUN SERPL-MCNC: 17.8 MG/DL (ref 9.8–20.1)
CALCIUM SERPL-MCNC: 9 MG/DL (ref 8.4–10.2)
CHLORIDE SERPL-SCNC: 100 MMOL/L (ref 98–107)
CO2 SERPL-SCNC: 33 MMOL/L (ref 23–31)
CREAT SERPL-MCNC: 0.74 MG/DL (ref 0.55–1.02)
CREAT/UREA NIT SERPL: 24
GLUCOSE SERPL-MCNC: 146 MG/DL (ref 82–115)
POTASSIUM SERPL-SCNC: 4.1 MMOL/L (ref 3.5–5.1)
SODIUM SERPL-SCNC: 139 MMOL/L (ref 136–145)

## 2021-04-09 ENCOUNTER — HISTORICAL (OUTPATIENT)
Dept: ADMINISTRATIVE | Facility: HOSPITAL | Age: 86
End: 2021-04-09

## 2021-04-12 ENCOUNTER — HISTORICAL (OUTPATIENT)
Dept: ADMINISTRATIVE | Facility: HOSPITAL | Age: 86
End: 2021-04-12

## 2021-04-12 LAB
ABS NEUT (OLG): 4.49 X10(3)/MCL (ref 2.1–9.2)
ALBUMIN SERPL-MCNC: 3.5 GM/DL (ref 3.4–4.8)
ALBUMIN/GLOB SERPL: 1.4 RATIO (ref 1.1–2)
ALP SERPL-CCNC: 48 UNIT/L (ref 40–150)
ALT SERPL-CCNC: 12 UNIT/L (ref 0–55)
APPEARANCE, UA: ABNORMAL
AST SERPL-CCNC: 15 UNIT/L (ref 5–34)
BACTERIA SPEC CULT: ABNORMAL /HPF
BASOPHILS # BLD AUTO: 0 X10(3)/MCL (ref 0–0.2)
BASOPHILS NFR BLD AUTO: 0 %
BILIRUB SERPL-MCNC: 0.5 MG/DL
BILIRUB UR QL STRIP: NEGATIVE
BILIRUBIN DIRECT+TOT PNL SERPL-MCNC: 0.2 MG/DL (ref 0–0.5)
BILIRUBIN DIRECT+TOT PNL SERPL-MCNC: 0.3 MG/DL (ref 0–0.8)
BUN SERPL-MCNC: 23.6 MG/DL (ref 9.8–20.1)
CALCIUM SERPL-MCNC: 9.5 MG/DL (ref 8.4–10.2)
CHLORIDE SERPL-SCNC: 105 MMOL/L (ref 98–107)
CO2 SERPL-SCNC: 29 MMOL/L (ref 23–31)
COLOR UR: YELLOW
CREAT SERPL-MCNC: 0.74 MG/DL (ref 0.55–1.02)
EOSINOPHIL # BLD AUTO: 0.2 X10(3)/MCL (ref 0–0.9)
EOSINOPHIL NFR BLD AUTO: 2 %
ERYTHROCYTE [DISTWIDTH] IN BLOOD BY AUTOMATED COUNT: 15 % (ref 11.5–17)
GLOBULIN SER-MCNC: 2.5 GM/DL (ref 2.4–3.5)
GLUCOSE (UA): NEGATIVE
GLUCOSE SERPL-MCNC: 149 MG/DL (ref 82–115)
HCT VFR BLD AUTO: 37.7 % (ref 37–47)
HGB BLD-MCNC: 11.7 GM/DL (ref 12–16)
HGB UR QL STRIP: NEGATIVE
KETONES UR QL STRIP: NEGATIVE
LEUKOCYTE ESTERASE UR QL STRIP: NEGATIVE
LYMPHOCYTES # BLD AUTO: 1 X10(3)/MCL (ref 0.6–4.6)
LYMPHOCYTES NFR BLD AUTO: 16 %
MCH RBC QN AUTO: 27.1 PG (ref 27–31)
MCHC RBC AUTO-ENTMCNC: 31 GM/DL (ref 33–36)
MCV RBC AUTO: 87.3 FL (ref 80–94)
MONOCYTES # BLD AUTO: 0.7 X10(3)/MCL (ref 0.1–1.3)
MONOCYTES NFR BLD AUTO: 11 %
NEUTROPHILS # BLD AUTO: 4.49 X10(3)/MCL (ref 2.1–9.2)
NEUTROPHILS NFR BLD AUTO: 70 %
NITRITE UR QL STRIP: NEGATIVE
PH UR STRIP: >9 [PH] (ref 5–9)
PLATELET # BLD AUTO: 227 X10(3)/MCL (ref 130–400)
PMV BLD AUTO: 12 FL (ref 9.4–12.4)
POTASSIUM SERPL-SCNC: 4.4 MMOL/L (ref 3.5–5.1)
PROT SERPL-MCNC: 6 GM/DL (ref 5.8–7.6)
PROT UR QL STRIP: ABNORMAL
RBC # BLD AUTO: 4.32 X10(6)/MCL (ref 4.2–5.4)
RBC #/AREA URNS HPF: ABNORMAL /[HPF]
SODIUM SERPL-SCNC: 142 MMOL/L (ref 136–145)
SP GR UR STRIP: 1.01 (ref 1–1.03)
SQUAMOUS EPITHELIAL, UA: 12 /HPF (ref 0–4)
UROBILINOGEN UR STRIP-ACNC: 0.2
WBC # SPEC AUTO: 6.4 X10(3)/MCL (ref 4.5–11.5)
WBC #/AREA URNS HPF: 7 /HPF (ref 0–3)

## 2021-04-13 LAB — FINAL CULTURE: NORMAL

## 2021-07-19 ENCOUNTER — HISTORICAL (OUTPATIENT)
Dept: ADMINISTRATIVE | Facility: HOSPITAL | Age: 86
End: 2021-07-19

## 2021-07-19 LAB
ABS NEUT (OLG): 3.66 X10(3)/MCL (ref 2.1–9.2)
ALBUMIN SERPL-MCNC: 3.1 GM/DL (ref 3.4–4.8)
ALBUMIN/GLOB SERPL: 1.1 RATIO (ref 1.1–2)
ALP SERPL-CCNC: 53 UNIT/L (ref 40–150)
ALT SERPL-CCNC: 17 UNIT/L (ref 0–55)
APPEARANCE, UA: ABNORMAL
AST SERPL-CCNC: 16 UNIT/L (ref 5–34)
BACTERIA SPEC CULT: ABNORMAL /HPF
BASOPHILS # BLD AUTO: 0 X10(3)/MCL (ref 0–0.2)
BASOPHILS NFR BLD AUTO: 0 %
BILIRUB SERPL-MCNC: 0.6 MG/DL
BILIRUB UR QL STRIP: NEGATIVE
BILIRUBIN DIRECT+TOT PNL SERPL-MCNC: 0.2 MG/DL (ref 0–0.5)
BILIRUBIN DIRECT+TOT PNL SERPL-MCNC: 0.4 MG/DL (ref 0–0.8)
BNP BLD-MCNC: 245.4 PG/ML
BUN SERPL-MCNC: 20.3 MG/DL (ref 9.8–20.1)
CALCIUM SERPL-MCNC: 8.9 MG/DL (ref 8.4–10.2)
CHLORIDE SERPL-SCNC: 105 MMOL/L (ref 98–107)
CHOLEST SERPL-MCNC: 171 MG/DL
CHOLEST/HDLC SERPL: 5 {RATIO} (ref 0–5)
CK SERPL-CCNC: 44 U/L (ref 29–168)
CO2 SERPL-SCNC: 31 MMOL/L (ref 23–31)
COLOR UR: YELLOW
CREAT SERPL-MCNC: 0.74 MG/DL (ref 0.55–1.02)
EOSINOPHIL # BLD AUTO: 0.2 X10(3)/MCL (ref 0–0.9)
EOSINOPHIL NFR BLD AUTO: 4 %
ERYTHROCYTE [DISTWIDTH] IN BLOOD BY AUTOMATED COUNT: 14.7 % (ref 11.5–17)
GLOBULIN SER-MCNC: 2.7 GM/DL (ref 2.4–3.5)
GLUCOSE (UA): NEGATIVE
GLUCOSE SERPL-MCNC: 116 MG/DL (ref 82–115)
HCT VFR BLD AUTO: 38 % (ref 37–47)
HDLC SERPL-MCNC: 32 MG/DL (ref 35–60)
HGB BLD-MCNC: 11.7 GM/DL (ref 12–16)
HGB UR QL STRIP: NEGATIVE
KETONES UR QL STRIP: NEGATIVE
LDLC SERPL CALC-MCNC: 108 MG/DL (ref 50–140)
LEUKOCYTE ESTERASE UR QL STRIP: ABNORMAL
LYMPHOCYTES # BLD AUTO: 1 X10(3)/MCL (ref 0.6–4.6)
LYMPHOCYTES NFR BLD AUTO: 18 %
MCH RBC QN AUTO: 27.1 PG (ref 27–31)
MCHC RBC AUTO-ENTMCNC: 30.8 GM/DL (ref 33–36)
MCV RBC AUTO: 88 FL (ref 80–94)
MONOCYTES # BLD AUTO: 0.7 X10(3)/MCL (ref 0.1–1.3)
MONOCYTES NFR BLD AUTO: 12 %
NEUTROPHILS # BLD AUTO: 3.66 X10(3)/MCL (ref 2.1–9.2)
NEUTROPHILS NFR BLD AUTO: 66 %
NITRITE UR QL STRIP: NEGATIVE
PH UR STRIP: 7.5 [PH] (ref 5–9)
PLATELET # BLD AUTO: 221 X10(3)/MCL (ref 130–400)
PMV BLD AUTO: 11.7 FL (ref 9.4–12.4)
POTASSIUM SERPL-SCNC: 3.9 MMOL/L (ref 3.5–5.1)
PROT SERPL-MCNC: 5.8 GM/DL (ref 5.8–7.6)
PROT UR QL STRIP: NEGATIVE
RBC # BLD AUTO: 4.32 X10(6)/MCL (ref 4.2–5.4)
RBC #/AREA URNS HPF: ABNORMAL /[HPF]
SODIUM SERPL-SCNC: 143 MMOL/L (ref 136–145)
SP GR UR STRIP: 1.02 (ref 1–1.03)
SQUAMOUS EPITHELIAL, UA: 8 /HPF (ref 0–4)
TRIGL SERPL-MCNC: 155 MG/DL (ref 37–140)
TSH SERPL-ACNC: 1.18 UIU/ML (ref 0.35–4.94)
UROBILINOGEN UR STRIP-ACNC: 1
VLDLC SERPL CALC-MCNC: 31 MG/DL
WBC # SPEC AUTO: 5.6 X10(3)/MCL (ref 4.5–11.5)
WBC #/AREA URNS HPF: 22 /HPF (ref 0–3)

## 2021-07-21 LAB — FINAL CULTURE: NO GROWTH

## 2021-08-27 ENCOUNTER — HISTORICAL (OUTPATIENT)
Dept: ADMINISTRATIVE | Facility: HOSPITAL | Age: 86
End: 2021-08-27

## 2021-08-27 LAB
ABS NEUT (OLG): 5.78 X10(3)/MCL (ref 2.1–9.2)
ALBUMIN SERPL-MCNC: 3.6 GM/DL (ref 3.4–4.8)
ALBUMIN/GLOB SERPL: 1.4 RATIO (ref 1.1–2)
ALP SERPL-CCNC: 51 UNIT/L (ref 40–150)
ALT SERPL-CCNC: 14 UNIT/L (ref 0–55)
APPEARANCE, UA: CLEAR
AST SERPL-CCNC: 18 UNIT/L (ref 5–34)
BACTERIA SPEC CULT: NORMAL /HPF
BASOPHILS # BLD AUTO: 0 X10(3)/MCL (ref 0–0.2)
BASOPHILS NFR BLD AUTO: 1 %
BILIRUB SERPL-MCNC: 0.8 MG/DL
BILIRUB UR QL STRIP: NEGATIVE
BILIRUBIN DIRECT+TOT PNL SERPL-MCNC: 0.3 MG/DL (ref 0–0.5)
BILIRUBIN DIRECT+TOT PNL SERPL-MCNC: 0.5 MG/DL (ref 0–0.8)
BNP BLD-MCNC: 152.9 PG/ML
BUN SERPL-MCNC: 20.6 MG/DL (ref 9.8–20.1)
CALCIUM SERPL-MCNC: 9.5 MG/DL (ref 8.4–10.2)
CHLORIDE SERPL-SCNC: 103 MMOL/L (ref 98–107)
CHOLEST SERPL-MCNC: 179 MG/DL
CHOLEST/HDLC SERPL: 6 {RATIO} (ref 0–5)
CK SERPL-CCNC: 36 U/L (ref 29–168)
CO2 SERPL-SCNC: 32 MMOL/L (ref 23–31)
COLOR UR: YELLOW
CREAT SERPL-MCNC: 0.86 MG/DL (ref 0.55–1.02)
EOSINOPHIL # BLD AUTO: 0.1 X10(3)/MCL (ref 0–0.9)
EOSINOPHIL NFR BLD AUTO: 2 %
ERYTHROCYTE [DISTWIDTH] IN BLOOD BY AUTOMATED COUNT: 14.7 % (ref 11.5–17)
GLOBULIN SER-MCNC: 2.6 GM/DL (ref 2.4–3.5)
GLUCOSE (UA): NEGATIVE
GLUCOSE SERPL-MCNC: 161 MG/DL (ref 82–115)
HCT VFR BLD AUTO: 41.2 % (ref 37–47)
HDLC SERPL-MCNC: 31 MG/DL (ref 35–60)
HGB BLD-MCNC: 12.6 GM/DL (ref 12–16)
HGB UR QL STRIP: NEGATIVE
KETONES UR QL STRIP: NEGATIVE
LDLC SERPL CALC-MCNC: 112 MG/DL (ref 50–140)
LEUKOCYTE ESTERASE UR QL STRIP: NEGATIVE
LYMPHOCYTES # BLD AUTO: 0.8 X10(3)/MCL (ref 0.6–4.6)
LYMPHOCYTES NFR BLD AUTO: 11 %
MCH RBC QN AUTO: 26.9 PG (ref 27–31)
MCHC RBC AUTO-ENTMCNC: 30.6 GM/DL (ref 33–36)
MCV RBC AUTO: 87.8 FL (ref 80–94)
MONOCYTES # BLD AUTO: 0.6 X10(3)/MCL (ref 0.1–1.3)
MONOCYTES NFR BLD AUTO: 8 %
NEUTROPHILS # BLD AUTO: 5.78 X10(3)/MCL (ref 2.1–9.2)
NEUTROPHILS NFR BLD AUTO: 78 %
NITRITE UR QL STRIP: NEGATIVE
PH UR STRIP: 6.5 [PH] (ref 5–9)
PLATELET # BLD AUTO: 213 X10(3)/MCL (ref 130–400)
PMV BLD AUTO: 11.9 FL (ref 9.4–12.4)
POTASSIUM SERPL-SCNC: 4 MMOL/L (ref 3.5–5.1)
PROT SERPL-MCNC: 6.2 GM/DL (ref 5.8–7.6)
PROT UR QL STRIP: NEGATIVE
RBC # BLD AUTO: 4.69 X10(6)/MCL (ref 4.2–5.4)
RBC #/AREA URNS HPF: NORMAL /[HPF]
SODIUM SERPL-SCNC: 143 MMOL/L (ref 136–145)
SP GR UR STRIP: 1.01 (ref 1–1.03)
SQUAMOUS EPITHELIAL, UA: NORMAL /HPF (ref 0–4)
TRIGL SERPL-MCNC: 179 MG/DL (ref 37–140)
TSH SERPL-ACNC: 1.53 UIU/ML (ref 0.35–4.94)
UROBILINOGEN UR STRIP-ACNC: 1
VLDLC SERPL CALC-MCNC: 36 MG/DL
WBC # SPEC AUTO: 7.4 X10(3)/MCL (ref 4.5–11.5)
WBC #/AREA URNS HPF: NORMAL /[HPF]

## 2021-12-01 ENCOUNTER — HISTORICAL (OUTPATIENT)
Dept: ADMINISTRATIVE | Facility: HOSPITAL | Age: 86
End: 2021-12-01

## 2021-12-01 LAB
ABS NEUT (OLG): 3.77 X10(3)/MCL (ref 2.1–9.2)
ALBUMIN SERPL-MCNC: 3.5 GM/DL (ref 3.4–4.8)
ALBUMIN/GLOB SERPL: 1.5 RATIO (ref 1.1–2)
ALP SERPL-CCNC: 46 UNIT/L (ref 40–150)
ALT SERPL-CCNC: 10 UNIT/L (ref 0–55)
AST SERPL-CCNC: 13 UNIT/L (ref 5–34)
BASOPHILS # BLD AUTO: 0 X10(3)/MCL (ref 0–0.2)
BASOPHILS NFR BLD AUTO: 1 %
BILIRUB SERPL-MCNC: 0.6 MG/DL
BILIRUBIN DIRECT+TOT PNL SERPL-MCNC: 0.2 MG/DL (ref 0–0.5)
BILIRUBIN DIRECT+TOT PNL SERPL-MCNC: 0.4 MG/DL (ref 0–0.8)
BNP BLD-MCNC: 152.6 PG/ML
BUN SERPL-MCNC: 21.5 MG/DL (ref 9.8–20.1)
CALCIUM SERPL-MCNC: 9.2 MG/DL (ref 8.7–10.5)
CHLORIDE SERPL-SCNC: 105 MMOL/L (ref 98–107)
CO2 SERPL-SCNC: 32 MMOL/L (ref 23–31)
CREAT SERPL-MCNC: 0.74 MG/DL (ref 0.55–1.02)
EOSINOPHIL # BLD AUTO: 0.2 X10(3)/MCL (ref 0–0.9)
EOSINOPHIL NFR BLD AUTO: 3 %
ERYTHROCYTE [DISTWIDTH] IN BLOOD BY AUTOMATED COUNT: 14.9 % (ref 11.5–17)
EST. AVERAGE GLUCOSE BLD GHB EST-MCNC: 165.7 MG/DL
GLOBULIN SER-MCNC: 2.4 GM/DL (ref 2.4–3.5)
GLUCOSE SERPL-MCNC: 93 MG/DL (ref 82–115)
HBA1C MFR BLD: 7.4 %
HCT VFR BLD AUTO: 36.1 % (ref 37–47)
HGB BLD-MCNC: 11.3 GM/DL (ref 12–16)
LIPASE SERPL-CCNC: 28 U/L
LYMPHOCYTES # BLD AUTO: 0.9 X10(3)/MCL (ref 0.6–4.6)
LYMPHOCYTES NFR BLD AUTO: 16 %
MCH RBC QN AUTO: 26.7 PG (ref 27–31)
MCHC RBC AUTO-ENTMCNC: 31.3 GM/DL (ref 33–36)
MCV RBC AUTO: 85.3 FL (ref 80–94)
MONOCYTES # BLD AUTO: 0.6 X10(3)/MCL (ref 0.1–1.3)
MONOCYTES NFR BLD AUTO: 11 %
NEUTROPHILS # BLD AUTO: 3.77 X10(3)/MCL (ref 2.1–9.2)
NEUTROPHILS NFR BLD AUTO: 69 %
PLATELET # BLD AUTO: 211 X10(3)/MCL (ref 130–400)
PMV BLD AUTO: 11.6 FL (ref 9.4–12.4)
POTASSIUM SERPL-SCNC: 3.9 MMOL/L (ref 3.5–5.1)
PROT SERPL-MCNC: 5.9 GM/DL (ref 5.8–7.6)
RBC # BLD AUTO: 4.23 X10(6)/MCL (ref 4.2–5.4)
SODIUM SERPL-SCNC: 142 MMOL/L (ref 136–145)
TSH SERPL-ACNC: 1.48 UIU/ML (ref 0.35–4.94)
WBC # SPEC AUTO: 5.5 X10(3)/MCL (ref 4.5–11.5)

## 2021-12-03 LAB — FINAL CULTURE: NORMAL

## 2022-09-15 ENCOUNTER — LAB VISIT (OUTPATIENT)
Dept: LAB | Facility: HOSPITAL | Age: 87
End: 2022-09-15
Attending: INTERNAL MEDICINE
Payer: MEDICARE

## 2022-09-15 DIAGNOSIS — E11.9 DIABETES MELLITUS WITHOUT COMPLICATION: ICD-10-CM

## 2022-09-15 DIAGNOSIS — D64.9 ANEMIA, UNSPECIFIED TYPE: ICD-10-CM

## 2022-09-15 DIAGNOSIS — R42 DIZZINESS AND GIDDINESS: ICD-10-CM

## 2022-09-15 DIAGNOSIS — Z00.01 ENCOUNTER FOR GENERAL ADULT MEDICAL EXAMINATION WITH ABNORMAL FINDINGS: ICD-10-CM

## 2022-09-15 DIAGNOSIS — I10 ESSENTIAL HYPERTENSION, MALIGNANT: Primary | ICD-10-CM

## 2022-09-15 DIAGNOSIS — E83.42 HYPOMAGNESEMIA: ICD-10-CM

## 2022-09-15 LAB
ALBUMIN SERPL-MCNC: 3.5 GM/DL (ref 3.4–4.8)
ALBUMIN/GLOB SERPL: 1.3 RATIO (ref 1.1–2)
ALP SERPL-CCNC: 59 UNIT/L (ref 40–150)
ALT SERPL-CCNC: 16 UNIT/L (ref 0–55)
AST SERPL-CCNC: 20 UNIT/L (ref 5–34)
BASOPHILS # BLD AUTO: 0.05 X10(3)/MCL (ref 0–0.2)
BASOPHILS NFR BLD AUTO: 0.8 %
BILIRUBIN DIRECT+TOT PNL SERPL-MCNC: 0.5 MG/DL
BUN SERPL-MCNC: 19.9 MG/DL (ref 9.8–20.1)
CALCIUM SERPL-MCNC: 9.7 MG/DL (ref 8.4–10.2)
CHLORIDE SERPL-SCNC: 104 MMOL/L (ref 98–107)
CHOLEST SERPL-MCNC: 192 MG/DL
CHOLEST/HDLC SERPL: 6 {RATIO} (ref 0–5)
CK SERPL-CCNC: 28 U/L (ref 29–168)
CO2 SERPL-SCNC: 30 MMOL/L (ref 23–31)
CREAT SERPL-MCNC: 0.77 MG/DL (ref 0.55–1.02)
EOSINOPHIL # BLD AUTO: 0.2 X10(3)/MCL (ref 0–0.9)
EOSINOPHIL NFR BLD AUTO: 3 %
ERYTHROCYTE [DISTWIDTH] IN BLOOD BY AUTOMATED COUNT: 15.7 % (ref 11.5–17)
EST. AVERAGE GLUCOSE BLD GHB EST-MCNC: 159.9 MG/DL
GFR SERPLBLD CREATININE-BSD FMLA CKD-EPI: >60 MLS/MIN/1.73/M2
GLOBULIN SER-MCNC: 2.8 GM/DL (ref 2.4–3.5)
GLUCOSE SERPL-MCNC: 119 MG/DL (ref 82–115)
HBA1C MFR BLD: 7.2 %
HCT VFR BLD AUTO: 38.5 % (ref 37–47)
HDLC SERPL-MCNC: 33 MG/DL (ref 35–60)
HGB BLD-MCNC: 11.7 GM/DL (ref 12–16)
IMM GRANULOCYTES # BLD AUTO: 0.02 X10(3)/MCL (ref 0–0.04)
IMM GRANULOCYTES NFR BLD AUTO: 0.3 %
LDLC SERPL CALC-MCNC: 122 MG/DL (ref 50–140)
LYMPHOCYTES # BLD AUTO: 1.32 X10(3)/MCL (ref 0.6–4.6)
LYMPHOCYTES NFR BLD AUTO: 19.9 %
MCH RBC QN AUTO: 25.5 PG (ref 27–31)
MCHC RBC AUTO-ENTMCNC: 30.4 MG/DL (ref 33–36)
MCV RBC AUTO: 84.1 FL (ref 80–94)
MONOCYTES # BLD AUTO: 0.68 X10(3)/MCL (ref 0.1–1.3)
MONOCYTES NFR BLD AUTO: 10.2 %
NEUTROPHILS # BLD AUTO: 4.4 X10(3)/MCL (ref 2.1–9.2)
NEUTROPHILS NFR BLD AUTO: 65.8 %
NRBC BLD AUTO-RTO: 0 %
PLATELET # BLD AUTO: 275 X10(3)/MCL (ref 130–400)
PMV BLD AUTO: 11.8 FL (ref 7.4–10.4)
POTASSIUM SERPL-SCNC: 4.2 MMOL/L (ref 3.5–5.1)
PROT SERPL-MCNC: 6.3 GM/DL (ref 5.8–7.6)
RBC # BLD AUTO: 4.58 X10(6)/MCL (ref 4.2–5.4)
SODIUM SERPL-SCNC: 141 MMOL/L (ref 136–145)
TRIGL SERPL-MCNC: 186 MG/DL (ref 37–140)
TSH SERPL-ACNC: 1.08 UIU/ML (ref 0.35–4.94)
VLDLC SERPL CALC-MCNC: 37 MG/DL
WBC # SPEC AUTO: 6.6 X10(3)/MCL (ref 4.5–11.5)

## 2022-09-15 PROCEDURE — 36415 COLL VENOUS BLD VENIPUNCTURE: CPT

## 2022-09-15 PROCEDURE — 83036 HEMOGLOBIN GLYCOSYLATED A1C: CPT

## 2022-09-15 PROCEDURE — 87088 URINE BACTERIA CULTURE: CPT

## 2022-09-15 PROCEDURE — 85025 COMPLETE CBC W/AUTO DIFF WBC: CPT

## 2022-09-15 PROCEDURE — 82550 ASSAY OF CK (CPK): CPT

## 2022-09-15 PROCEDURE — 80053 COMPREHEN METABOLIC PANEL: CPT

## 2022-09-15 PROCEDURE — 84443 ASSAY THYROID STIM HORMONE: CPT

## 2022-09-15 PROCEDURE — 80061 LIPID PANEL: CPT

## 2022-09-17 LAB — BACTERIA UR CULT: NORMAL

## 2022-09-25 ENCOUNTER — HOSPITAL ENCOUNTER (EMERGENCY)
Facility: HOSPITAL | Age: 87
Discharge: HOME | End: 2022-09-25
Attending: EMERGENCY MEDICINE
Payer: MEDICARE

## 2022-09-25 VITALS
WEIGHT: 190 LBS | DIASTOLIC BLOOD PRESSURE: 55 MMHG | OXYGEN SATURATION: 95 % | HEART RATE: 69 BPM | SYSTOLIC BLOOD PRESSURE: 117 MMHG | TEMPERATURE: 98 F | RESPIRATION RATE: 19 BRPM

## 2022-09-25 DIAGNOSIS — K52.9 ENTERITIS: Primary | ICD-10-CM

## 2022-09-25 LAB
ALBUMIN SERPL-MCNC: 3.7 GM/DL (ref 3.4–4.8)
ALBUMIN/GLOB SERPL: 1.2 RATIO (ref 1.1–2)
ALP SERPL-CCNC: 57 UNIT/L (ref 40–150)
ALT SERPL-CCNC: 14 UNIT/L (ref 0–55)
AST SERPL-CCNC: 19 UNIT/L (ref 5–34)
BASOPHILS # BLD AUTO: 0.04 X10(3)/MCL (ref 0–0.2)
BASOPHILS NFR BLD AUTO: 0.3 %
BILIRUBIN DIRECT+TOT PNL SERPL-MCNC: 0.8 MG/DL
BUN SERPL-MCNC: 27.2 MG/DL (ref 9.8–20.1)
CALCIUM SERPL-MCNC: 10.1 MG/DL (ref 8.4–10.2)
CHLORIDE SERPL-SCNC: 100 MMOL/L (ref 98–107)
CO2 SERPL-SCNC: 27 MMOL/L (ref 23–31)
CREAT SERPL-MCNC: 1.01 MG/DL (ref 0.55–1.02)
EOSINOPHIL # BLD AUTO: 0.06 X10(3)/MCL (ref 0–0.9)
EOSINOPHIL NFR BLD AUTO: 0.5 %
ERYTHROCYTE [DISTWIDTH] IN BLOOD BY AUTOMATED COUNT: 16 % (ref 11.5–17)
GFR SERPLBLD CREATININE-BSD FMLA CKD-EPI: 54 MLS/MIN/1.73/M2
GLOBULIN SER-MCNC: 3 GM/DL (ref 2.4–3.5)
GLUCOSE SERPL-MCNC: 216 MG/DL (ref 82–115)
HCT VFR BLD AUTO: 41.6 % (ref 37–47)
HGB BLD-MCNC: 13.1 GM/DL (ref 12–16)
IMM GRANULOCYTES # BLD AUTO: 0.04 X10(3)/MCL (ref 0–0.04)
IMM GRANULOCYTES NFR BLD AUTO: 0.3 %
LACTATE SERPL-SCNC: 1.6 MMOL/L (ref 0.5–2.2)
LIPASE SERPL-CCNC: 11 U/L
LYMPHOCYTES # BLD AUTO: 0.94 X10(3)/MCL (ref 0.6–4.6)
LYMPHOCYTES NFR BLD AUTO: 7.9 %
MCH RBC QN AUTO: 25.6 PG (ref 27–31)
MCHC RBC AUTO-ENTMCNC: 31.5 MG/DL (ref 33–36)
MCV RBC AUTO: 81.4 FL (ref 80–94)
MONOCYTES # BLD AUTO: 0.81 X10(3)/MCL (ref 0.1–1.3)
MONOCYTES NFR BLD AUTO: 6.8 %
NEUTROPHILS # BLD AUTO: 10 X10(3)/MCL (ref 2.1–9.2)
NEUTROPHILS NFR BLD AUTO: 84.2 %
NRBC BLD AUTO-RTO: 0 %
PLATELET # BLD AUTO: 282 X10(3)/MCL (ref 130–400)
PMV BLD AUTO: 11.7 FL (ref 7.4–10.4)
POTASSIUM SERPL-SCNC: 4 MMOL/L (ref 3.5–5.1)
PROT SERPL-MCNC: 6.7 GM/DL (ref 5.8–7.6)
RBC # BLD AUTO: 5.11 X10(6)/MCL (ref 4.2–5.4)
SARS-COV-2 RDRP RESP QL NAA+PROBE: NEGATIVE
SODIUM SERPL-SCNC: 140 MMOL/L (ref 136–145)
TROPONIN I SERPL-MCNC: 0.03 NG/ML (ref 0–0.04)
WBC # SPEC AUTO: 11.8 X10(3)/MCL (ref 4.5–11.5)

## 2022-09-25 PROCEDURE — 83605 ASSAY OF LACTIC ACID: CPT | Performed by: EMERGENCY MEDICINE

## 2022-09-25 PROCEDURE — 87635 SARS-COV-2 COVID-19 AMP PRB: CPT | Performed by: EMERGENCY MEDICINE

## 2022-09-25 PROCEDURE — 96374 THER/PROPH/DIAG INJ IV PUSH: CPT

## 2022-09-25 PROCEDURE — 96375 TX/PRO/DX INJ NEW DRUG ADDON: CPT

## 2022-09-25 PROCEDURE — 84484 ASSAY OF TROPONIN QUANT: CPT | Performed by: EMERGENCY MEDICINE

## 2022-09-25 PROCEDURE — 80053 COMPREHEN METABOLIC PANEL: CPT | Performed by: EMERGENCY MEDICINE

## 2022-09-25 PROCEDURE — 96361 HYDRATE IV INFUSION ADD-ON: CPT

## 2022-09-25 PROCEDURE — 63600175 PHARM REV CODE 636 W HCPCS: Performed by: EMERGENCY MEDICINE

## 2022-09-25 PROCEDURE — 36415 COLL VENOUS BLD VENIPUNCTURE: CPT | Performed by: EMERGENCY MEDICINE

## 2022-09-25 PROCEDURE — 85025 COMPLETE CBC W/AUTO DIFF WBC: CPT | Performed by: EMERGENCY MEDICINE

## 2022-09-25 PROCEDURE — 83690 ASSAY OF LIPASE: CPT | Performed by: EMERGENCY MEDICINE

## 2022-09-25 PROCEDURE — 25500020 PHARM REV CODE 255: Performed by: EMERGENCY MEDICINE

## 2022-09-25 PROCEDURE — 99285 EMERGENCY DEPT VISIT HI MDM: CPT | Mod: 25

## 2022-09-25 PROCEDURE — 25000003 PHARM REV CODE 250: Performed by: EMERGENCY MEDICINE

## 2022-09-25 RX ORDER — DIPHENHYDRAMINE HYDROCHLORIDE 50 MG/ML
25 INJECTION INTRAMUSCULAR; INTRAVENOUS
Status: COMPLETED | OUTPATIENT
Start: 2022-09-25 | End: 2022-09-25

## 2022-09-25 RX ORDER — SPIRONOLACTONE 25 MG/1
25 TABLET ORAL DAILY
COMMUNITY
Start: 2022-09-08

## 2022-09-25 RX ORDER — ONDANSETRON 4 MG/1
4 TABLET, FILM COATED ORAL EVERY 6 HOURS
Qty: 12 TABLET | Refills: 0 | Status: SHIPPED | OUTPATIENT
Start: 2022-09-25

## 2022-09-25 RX ORDER — SERTRALINE HYDROCHLORIDE 100 MG/1
TABLET, FILM COATED ORAL
COMMUNITY
Start: 2022-07-19

## 2022-09-25 RX ORDER — APIXABAN 5 MG/1
TABLET, FILM COATED ORAL
COMMUNITY
Start: 2022-08-23

## 2022-09-25 RX ORDER — MORPHINE SULFATE 4 MG/ML
4 INJECTION, SOLUTION INTRAMUSCULAR; INTRAVENOUS
Status: COMPLETED | OUTPATIENT
Start: 2022-09-25 | End: 2022-09-25

## 2022-09-25 RX ORDER — FUROSEMIDE 40 MG/1
TABLET ORAL
Status: ON HOLD | COMMUNITY
Start: 2022-08-05 | End: 2023-08-30 | Stop reason: HOSPADM

## 2022-09-25 RX ORDER — SYRING-NEEDL,DISP,INSUL,0.3 ML 31 GX5/16"
SYRINGE, EMPTY DISPOSABLE MISCELLANEOUS
COMMUNITY
Start: 2022-08-09

## 2022-09-25 RX ORDER — GLIPIZIDE 10 MG/1
TABLET ORAL
Status: ON HOLD | COMMUNITY
Start: 2022-08-23 | End: 2023-08-30 | Stop reason: HOSPADM

## 2022-09-25 RX ORDER — DEXAMETHASONE SODIUM PHOSPHATE 4 MG/ML
8 INJECTION, SOLUTION INTRA-ARTICULAR; INTRALESIONAL; INTRAMUSCULAR; INTRAVENOUS; SOFT TISSUE
Status: COMPLETED | OUTPATIENT
Start: 2022-09-25 | End: 2022-09-25

## 2022-09-25 RX ORDER — BUDESONIDE 3 MG/1
9 CAPSULE, COATED PELLETS ORAL DAILY
COMMUNITY
Start: 2022-05-24

## 2022-09-25 RX ORDER — QUINAPRIL 20 MG/1
TABLET ORAL
COMMUNITY
Start: 2022-07-19

## 2022-09-25 RX ORDER — FENOFIBRATE 160 MG/1
160 TABLET ORAL DAILY
COMMUNITY
Start: 2022-08-09

## 2022-09-25 RX ORDER — DICYCLOMINE HYDROCHLORIDE 20 MG/1
20 TABLET ORAL 2 TIMES DAILY
Qty: 20 TABLET | Refills: 0 | Status: SHIPPED | OUTPATIENT
Start: 2022-09-25 | End: 2022-10-25

## 2022-09-25 RX ORDER — INSULIN ASPART 100 [IU]/ML
INJECTION, SUSPENSION SUBCUTANEOUS
Status: ON HOLD | COMMUNITY
Start: 2022-09-08 | End: 2023-08-29 | Stop reason: HOSPADM

## 2022-09-25 RX ORDER — BLOOD SUGAR DIAGNOSTIC
STRIP MISCELLANEOUS
COMMUNITY
Start: 2022-09-14

## 2022-09-25 RX ADMIN — IOPAMIDOL 100 ML: 755 INJECTION, SOLUTION INTRAVENOUS at 05:09

## 2022-09-25 RX ADMIN — DEXAMETHASONE SODIUM PHOSPHATE 8 MG: 4 INJECTION, SOLUTION INTRA-ARTICULAR; INTRALESIONAL; INTRAMUSCULAR; INTRAVENOUS; SOFT TISSUE at 05:09

## 2022-09-25 RX ADMIN — MORPHINE SULFATE 4 MG: 4 INJECTION INTRAVENOUS at 03:09

## 2022-09-25 RX ADMIN — DIPHENHYDRAMINE HYDROCHLORIDE 25 MG: 50 INJECTION INTRAMUSCULAR; INTRAVENOUS at 05:09

## 2022-09-25 RX ADMIN — SODIUM CHLORIDE 1000 ML: 9 INJECTION, SOLUTION INTRAVENOUS at 03:09

## 2022-09-25 NOTE — ED PROVIDER NOTES
Encounter Date: 9/25/2022    SCRIBE #1 NOTE: I, Michelle De La Fuente, am scribing for, and in the presence of,  Dr. Markus Ferrer. I have scribed the following portions of the note - Other sections scribed: HPI, ROS, PE.     History     Chief Complaint   Patient presents with    Abdominal Pain     Pt reports LUQ pain that started today with worsening through the day, with n/v. Denies fever, diarrhea. 4mg Zofran given in route.     88 y/o female with hx of appendectomy presents to ED for LUQ pain that onset yesterday. Pt states pain got worse throughout the day. She reports vomiting and that her last bowel movement was today. Pt denies diarrhea and blood in stool. Pt is on blood thinners.    The history is provided by the patient. No  was used.   Abdominal Pain  The current episode started yesterday. The problem has been gradually worsening. The abdominal pain is located in the LUQ. The other symptoms of the illness include vomiting. The other symptoms of the illness do not include fever, shortness of breath, nausea, diarrhea or dysuria.   Symptoms associated with the illness do not include chills, constipation, frequency or back pain.   Review of patient's allergies indicates:   Allergen Reactions    Centratex [iron-folic acid-mv, min cmb#15]     Cephalexin     Erythromycin     Hydroxyzine     Iodinated contrast media     Iodine     Naldecon dx     Penicillins Other (See Comments)     unknown    Tetanus vaccines and toxoid     Vioxx [rofecoxib]      Past Medical History:   Diagnosis Date    Anemia, unspecified     CAD (coronary artery disease)     Crohn disease     DM (diabetes mellitus)     History of coronary angioplasty with insertion of stent     HLD (hyperlipidemia)     HTN (hypertension)     Memory loss      No past surgical history on file.  No family history on file.     Review of Systems   Constitutional:  Negative for chills and fever.   HENT:  Negative for congestion and ear pain.     Eyes:  Negative for discharge.   Respiratory:  Negative for cough, shortness of breath and wheezing.    Cardiovascular:  Negative for chest pain and leg swelling.   Gastrointestinal:  Positive for abdominal pain (LUQ) and vomiting. Negative for blood in stool, constipation, diarrhea and nausea.   Genitourinary:  Negative for dysuria, flank pain and frequency.   Musculoskeletal:  Negative for back pain and joint swelling.   Skin:  Negative for rash.   Neurological:  Negative for dizziness, weakness and headaches.   Psychiatric/Behavioral:  Negative for agitation, confusion and hallucinations.      Physical Exam     Initial Vitals [09/25/22 0239]   BP Pulse Resp Temp SpO2   (!) 134/56 70 17 97.7 °F (36.5 °C) 97 %      MAP       --         Physical Exam    Nursing note and vitals reviewed.  Constitutional: She appears well-developed. No distress.   HENT:   Head: Normocephalic and atraumatic.   Mouth/Throat: Oropharynx is clear and moist.   Eyes: Conjunctivae and EOM are normal. Pupils are equal, round, and reactive to light.   Neck: Neck supple.   Cardiovascular:  Normal rate and regular rhythm.           No murmur heard.  Pulmonary/Chest: Breath sounds normal. No respiratory distress. She exhibits no tenderness.   Abdominal: Abdomen is soft. Bowel sounds are normal. She exhibits no distension. There is abdominal tenderness (moderate LUQ). There is no rebound and no guarding.   Musculoskeletal:         General: Normal range of motion.      Cervical back: Neck supple.      Lumbar back: Normal. Normal range of motion.     Neurological: She is alert and oriented to person, place, and time. She has normal strength. No cranial nerve deficit or sensory deficit.   Psychiatric: She has a normal mood and affect. Judgment normal.       ED Course   Procedures  Labs Reviewed   COMPREHENSIVE METABOLIC PANEL - Abnormal; Notable for the following components:       Result Value    Glucose Level 216 (*)     Blood Urea Nitrogen 27.2  (*)     All other components within normal limits   CBC WITH DIFFERENTIAL - Abnormal; Notable for the following components:    WBC 11.8 (*)     MCH 25.6 (*)     MCHC 31.5 (*)     MPV 11.7 (*)     Neut # 10.0 (*)     All other components within normal limits   LIPASE - Normal   TROPONIN I - Normal   LACTIC ACID, PLASMA - Normal   SARS-COV-2 RNA AMPLIFICATION, QUAL - Normal   CBC W/ AUTO DIFFERENTIAL    Narrative:     The following orders were created for panel order CBC auto differential.  Procedure                               Abnormality         Status                     ---------                               -----------         ------                     CBC with Differential[[id number]]         Abnormal            Final result                 Please view results for these tests on the individual orders.   URINALYSIS, REFLEX TO URINE CULTURE          Imaging Results              CT Abdomen Pelvis With Contrast (Preliminary result)  Result time 09/25/22 05:35:49      Preliminary result by Nadir Perry MD (09/25/22 05:35:49)                   Narrative:    START OF REPORT:  Technique: CT of the abdomen and pelvis was performed with axial images as well as sagittal and coronal reconstruction images with intravenous contrast.    Comparison: None available.    Clinical History: Abdominal pain, acute, nonlocalized.    Dosage Information: Automated Exposure Control was utilized.    Findings:  Lines and Tubes: None.  Thorax:  Lungs: There is moderate nonspecific dependent change at the lung bases. Moderate scattered streaky opacity is present at the visualized lung bases, consistent with scarring and atelectasis.  Pleura: No effusions or thickening.  Heart: The heart appears somewhat prominent.  Abdomen:  Abdominal Wall: No abdominal wall pathology is seen.  Liver: The liver otherwise appears unremarkable.  Biliary System: The extra hepatic biliary system appears prominent in this patient status post  cholecystectomy.  Gallbladder: Surgical clips are seen in the gallbladder fossa which may reflect prior cholecystectomy.  Pancreas: Moderate pancreatic atrophy is seen.  Spleen: The spleen appears unremarkable.  Adrenals: The adrenal glands appear unremarkable.  Kidneys: The kidneys appear unremarkable with no stones cysts masses or hydronephrosis.  Aorta: There is moderate calcification of the.  IVC: Unremarkable.  Bowel:  Esophagus: The visualized esophagus appears unremarkable.  Stomach: The stomach appears unremarkable.  Duodenum: A small diverticulum is present arising from the second part of the duodenum.  Small Bowel: There are multiple loops of fluid filled distended small bowel with mild mucosal enhancement without gross focal transition.  Colon: Multiple diverticula are seen sigmoid colon. No associated inflammatory stranding is seen to suggest diverticulitis. Note is made of an intraluminal fatty lesion in the transverse colon centered on image 57 series 2 likely representing an intraluminal lipoma.  Appendix: The appendix appears unremarkable and is partially seen on âImage 22, Series 8â through âImage 46, Series 8â.  Peritoneum: No free intraperitoneal air is seen. Mild intraperitoneal free fluid is seen in the pelvis.    Pelvis:  Bladder: The bladder appears unremarkable.  Female:  Uterus: The uterus is not identified which may reflect prior hysterectomy.  Ovaries: No adnexal masses are seen.    Bony structures:  Dorsal Spine: There is severe spondylosis of the visualized dorsal spine. Moderate levoscoliosis of the mid lumbar spine is noted. Multilevel degenerative disc disease is also seen.      Impression:  1. There are multiple loops of fluid filled distended small bowel with mild mucosal enhancement without gross focal transition. These findings are suggestive of small bowel enteritis with associated ileus with the possibility of early or partial small bowel obstruction not entirely  excluded. Correlate with clinical and laboratory findings as regards tissue evaluation and follow up.  2. Mild intraperitoneal free fluid is seen in the pelvis.  3. Note is made of an intraluminal fatty lesion in the transverse colon centered on image 57 series 2 likely representing an intraluminal lipoma. Correlate with clinical and laboratory findings as regards to additional evaluation and follow up.  4. Details and findings as discussed.                          Preliminary result by Interface, Rad Results In (09/25/22 05:35:49)                   Narrative:    START OF REPORT:  Technique: CT of the abdomen and pelvis was performed with axial images as well as sagittal and coronal reconstruction images with intravenous contrast.    Comparison: None available.    Clinical History: Abdominal pain, acute, nonlocalized.    Dosage Information: Automated Exposure Control was utilized.    Findings:  Lines and Tubes: None.  Thorax:  Lungs: There is moderate nonspecific dependent change at the lung bases. Moderate scattered streaky opacity is present at the visualized lung bases, consistent with scarring and atelectasis.  Pleura: No effusions or thickening.  Heart: The heart appears somewhat prominent.  Abdomen:  Abdominal Wall: No abdominal wall pathology is seen.  Liver: The liver otherwise appears unremarkable.  Biliary System: The extra hepatic biliary system appears prominent in this patient status post cholecystectomy.  Gallbladder: Surgical clips are seen in the gallbladder fossa which may reflect prior cholecystectomy.  Pancreas: Moderate pancreatic atrophy is seen.  Spleen: The spleen appears unremarkable.  Adrenals: The adrenal glands appear unremarkable.  Kidneys: The kidneys appear unremarkable with no stones cysts masses or hydronephrosis.  Aorta: There is moderate calcification of the.  IVC: Unremarkable.  Bowel:  Esophagus: The visualized esophagus appears unremarkable.  Stomach: The stomach appears  unremarkable.  Duodenum: A small diverticulum is present arising from the second part of the duodenum.  Small Bowel: There are multiple loops of fluid filled distended small bowel with mild mucosal enhancement without gross focal transition.  Colon: Multiple diverticula are seen sigmoid colon. No associated inflammatory stranding is seen to suggest diverticulitis. Note is made of an intraluminal fatty lesion in the transverse colon centered on image 57 series 2 likely representing an intraluminal lipoma.  Appendix: The appendix appears unremarkable and is partially seen on âImage 22, Series 8â through âImage 46, Series 8â.  Peritoneum: No free intraperitoneal air is seen. Mild intraperitoneal free fluid is seen in the pelvis.    Pelvis:  Bladder: The bladder appears unremarkable.  Female:  Uterus: The uterus is not identified which may reflect prior hysterectomy.  Ovaries: No adnexal masses are seen.    Bony structures:  Dorsal Spine: There is severe spondylosis of the visualized dorsal spine. Moderate levoscoliosis of the mid lumbar spine is noted. Multilevel degenerative disc disease is also seen.      Impression:  1. There are multiple loops of fluid filled distended small bowel with mild mucosal enhancement without gross focal transition. These findings are suggestive of small bowel enteritis with associated ileus with the possibility of early or partial small bowel obstruction not entirely excluded. Correlate with clinical and laboratory findings as regards tissue evaluation and follow up.  2. Mild intraperitoneal free fluid is seen in the pelvis.  3. Note is made of an intraluminal fatty lesion in the transverse colon centered on image 57 series 2 likely representing an intraluminal lipoma. Correlate with clinical and laboratory findings as regards to additional evaluation and follow up.  4. Details and findings as discussed.                                         Medications   sodium chloride 0.9%  bolus 1,000 mL (0 mLs Intravenous Stopped 9/25/22 0542)   morphine injection 4 mg (4 mg Intravenous Given 9/25/22 0330)   diphenhydrAMINE injection 25 mg (25 mg Intravenous Given 9/25/22 0507)   dexamethasone injection 8 mg (8 mg Intravenous Given 9/25/22 0507)   iopamidoL (ISOVUE-370) injection 100 mL (100 mLs Intravenous Given 9/25/22 0544)     Medical Decision Making:   ED Management:  Pain improved, no tenderness on reexam, pt not vomiting now, labs unremarkable save for mild elev wbc.  Trop/lactic neg, LFT's normal, ct shows enteritis vs ileus - no sbo transition point - pt had bm just pta here.  Family and pt ok with plan and given return precautions.        Scribe Attestation:   Scribe #1: I performed the above scribed service and the documentation accurately describes the services I performed. I attest to the accuracy of the note.    Attending Attestation:           Physician Attestation for Scribe:  Physician Attestation Statement for Scribe #1: I, Dr. Markus Ferrer, reviewed documentation, as scribed by Michelle De La Fuente in my presence, and it is both accurate and complete.           ED Course as of 09/25/22 0626   Sun Sep 25, 2022   3816 Paged cardiologist [KH]      ED Course User Index  [KH] Golden Romero                 Clinical Impression:   Final diagnoses:  [K52.9] Enteritis (Primary)      ED Disposition Condition    Discharge Stable          ED Prescriptions       Medication Sig Dispense Start Date End Date Auth. Provider    ondansetron (ZOFRAN) 4 MG tablet Take 1 tablet (4 mg total) by mouth every 6 (six) hours. 12 tablet 9/25/2022 -- Markus Ferrer MD    dicyclomine (BENTYL) 20 mg tablet Take 1 tablet (20 mg total) by mouth 2 (two) times daily. 20 tablet 9/25/2022 10/25/2022 Markus Ferrer MD          Follow-up Information       Follow up With Specialties Details Why Contact Info    Juvenal Bains MD Internal Medicine In 2 days  [id number] [facility]  [facility] [id number]  [phone]               Markus Ferrer MD  09/25/22 0654

## 2022-10-04 ENCOUNTER — HOSPITAL ENCOUNTER (OUTPATIENT)
Dept: RADIOLOGY | Facility: HOSPITAL | Age: 87
Discharge: HOME | End: 2022-10-04
Attending: INTERNAL MEDICINE
Payer: MEDICARE

## 2022-10-04 DIAGNOSIS — S92.902S: Primary | ICD-10-CM

## 2022-10-04 DIAGNOSIS — S92.902S: ICD-10-CM

## 2022-10-04 PROCEDURE — 73620 X-RAY EXAM OF FOOT: CPT | Mod: TC,RT

## 2022-10-04 PROCEDURE — 73620 X-RAY EXAM OF FOOT: CPT | Mod: TC,LT

## 2022-10-21 DIAGNOSIS — R11.0 NAUSEA: ICD-10-CM

## 2022-10-21 DIAGNOSIS — K21.9 GASTROESOPHAGEAL REFLUX DISEASE: ICD-10-CM

## 2022-10-21 DIAGNOSIS — K59.00 CONSTIPATION, UNSPECIFIED: ICD-10-CM

## 2022-10-21 DIAGNOSIS — K57.90 DIVERTICULAR DISEASE: ICD-10-CM

## 2022-10-21 DIAGNOSIS — K50.00 CROHN'S DISEASE OF SMALL INTESTINE: Primary | ICD-10-CM

## 2022-10-21 DIAGNOSIS — R12 HEARTBURN: ICD-10-CM

## 2022-12-02 ENCOUNTER — HOSPITAL ENCOUNTER (OUTPATIENT)
Dept: RADIOLOGY | Facility: HOSPITAL | Age: 87
Discharge: HOME | End: 2022-12-02
Attending: INTERNAL MEDICINE
Payer: MEDICARE

## 2022-12-02 DIAGNOSIS — S92.902S: Primary | ICD-10-CM

## 2022-12-02 DIAGNOSIS — S92.902S: ICD-10-CM

## 2022-12-02 PROCEDURE — 73620 X-RAY EXAM OF FOOT: CPT | Mod: TC,LT

## 2022-12-02 PROCEDURE — 73620 X-RAY EXAM OF FOOT: CPT | Mod: TC,RT

## 2023-01-30 ENCOUNTER — HOSPITAL ENCOUNTER (OUTPATIENT)
Dept: RADIOLOGY | Facility: HOSPITAL | Age: 88
Discharge: HOME | End: 2023-01-30
Attending: NURSE PRACTITIONER
Payer: MEDICARE

## 2023-01-30 DIAGNOSIS — K21.9 GASTROESOPHAGEAL REFLUX DISEASE: ICD-10-CM

## 2023-01-30 DIAGNOSIS — K57.90 DIVERTICULAR DISEASE: ICD-10-CM

## 2023-01-30 DIAGNOSIS — R12 HEARTBURN: ICD-10-CM

## 2023-01-30 DIAGNOSIS — K50.00 CROHN'S DISEASE OF SMALL INTESTINE: ICD-10-CM

## 2023-01-30 DIAGNOSIS — K59.00 CONSTIPATION, UNSPECIFIED: ICD-10-CM

## 2023-01-30 DIAGNOSIS — R11.0 NAUSEA: ICD-10-CM

## 2023-01-30 PROCEDURE — 74220 X-RAY XM ESOPHAGUS 1CNTRST: CPT | Mod: TC

## 2023-03-21 ENCOUNTER — LAB VISIT (OUTPATIENT)
Dept: LAB | Facility: HOSPITAL | Age: 88
End: 2023-03-21
Attending: INTERNAL MEDICINE
Payer: MEDICARE

## 2023-03-21 DIAGNOSIS — E11.9 DIABETES MELLITUS WITHOUT COMPLICATION: ICD-10-CM

## 2023-03-21 DIAGNOSIS — R53.83 FATIGUE, UNSPECIFIED TYPE: ICD-10-CM

## 2023-03-21 DIAGNOSIS — E83.42 HYPOMAGNESEMIA: ICD-10-CM

## 2023-03-21 DIAGNOSIS — U07.1 CLINICAL DIAGNOSIS OF SEVERE ACUTE RESPIRATORY SYNDROME CORONAVIRUS 2 (SARS-COV-2) DISEASE: ICD-10-CM

## 2023-03-21 DIAGNOSIS — I95.9 HYPOTENSION, UNSPECIFIED HYPOTENSION TYPE: ICD-10-CM

## 2023-03-21 DIAGNOSIS — D64.9 ANEMIA, UNSPECIFIED TYPE: ICD-10-CM

## 2023-03-21 DIAGNOSIS — Z00.01 ENCOUNTER FOR GENERAL ADULT MEDICAL EXAMINATION WITH ABNORMAL FINDINGS: Primary | ICD-10-CM

## 2023-03-21 DIAGNOSIS — I10 ESSENTIAL HYPERTENSION, MALIGNANT: ICD-10-CM

## 2023-03-21 LAB
ALBUMIN SERPL-MCNC: 3.7 G/DL (ref 3.4–4.8)
ALBUMIN/GLOB SERPL: 1.4 RATIO (ref 1.1–2)
ALP SERPL-CCNC: 54 UNIT/L (ref 40–150)
ALT SERPL-CCNC: 12 UNIT/L (ref 0–55)
AST SERPL-CCNC: 15 UNIT/L (ref 5–34)
BASOPHILS # BLD AUTO: 0.03 X10(3)/MCL (ref 0–0.2)
BASOPHILS NFR BLD AUTO: 0.5 %
BILIRUBIN DIRECT+TOT PNL SERPL-MCNC: 0.7 MG/DL
BUN SERPL-MCNC: 28.6 MG/DL (ref 9.8–20.1)
CALCIUM SERPL-MCNC: 9.9 MG/DL (ref 8.4–10.2)
CHLORIDE SERPL-SCNC: 101 MMOL/L (ref 98–107)
CHOLEST SERPL-MCNC: 186 MG/DL
CHOLEST/HDLC SERPL: 5 {RATIO} (ref 0–5)
CK SERPL-CCNC: 43 U/L (ref 29–168)
CO2 SERPL-SCNC: 31 MMOL/L (ref 23–31)
CREAT SERPL-MCNC: 0.94 MG/DL (ref 0.55–1.02)
EOSINOPHIL # BLD AUTO: 0.16 X10(3)/MCL (ref 0–0.9)
EOSINOPHIL NFR BLD AUTO: 2.5 %
ERYTHROCYTE [DISTWIDTH] IN BLOOD BY AUTOMATED COUNT: 14.8 % (ref 11.5–17)
EST. AVERAGE GLUCOSE BLD GHB EST-MCNC: 168.6 MG/DL
GFR SERPLBLD CREATININE-BSD FMLA CKD-EPI: 59 MLS/MIN/1.73/M2
GLOBULIN SER-MCNC: 2.7 GM/DL (ref 2.4–3.5)
GLUCOSE SERPL-MCNC: 111 MG/DL (ref 82–115)
HBA1C MFR BLD: 7.5 %
HCT VFR BLD AUTO: 39.8 % (ref 37–47)
HDLC SERPL-MCNC: 34 MG/DL (ref 35–60)
HGB BLD-MCNC: 12.2 G/DL (ref 12–16)
IMM GRANULOCYTES # BLD AUTO: 0.02 X10(3)/MCL (ref 0–0.04)
IMM GRANULOCYTES NFR BLD AUTO: 0.3 %
LDLC SERPL CALC-MCNC: 123 MG/DL (ref 50–140)
LYMPHOCYTES # BLD AUTO: 1.05 X10(3)/MCL (ref 0.6–4.6)
LYMPHOCYTES NFR BLD AUTO: 16.3 %
MCH RBC QN AUTO: 25.9 PG
MCHC RBC AUTO-ENTMCNC: 30.7 G/DL (ref 33–36)
MCV RBC AUTO: 84.5 FL (ref 80–94)
MONOCYTES # BLD AUTO: 0.69 X10(3)/MCL (ref 0.1–1.3)
MONOCYTES NFR BLD AUTO: 10.7 %
NEUTROPHILS # BLD AUTO: 4.51 X10(3)/MCL (ref 2.1–9.2)
NEUTROPHILS NFR BLD AUTO: 69.7 %
NRBC BLD AUTO-RTO: 0 %
PLATELET # BLD AUTO: 241 X10(3)/MCL (ref 130–400)
PMV BLD AUTO: 11.6 FL (ref 7.4–10.4)
POTASSIUM SERPL-SCNC: 4.1 MMOL/L (ref 3.5–5.1)
PROT SERPL-MCNC: 6.4 GM/DL (ref 5.8–7.6)
RBC # BLD AUTO: 4.71 X10(6)/MCL (ref 4.2–5.4)
SODIUM SERPL-SCNC: 140 MMOL/L (ref 136–145)
TRIGL SERPL-MCNC: 143 MG/DL (ref 37–140)
TSH SERPL-ACNC: 1.44 UIU/ML (ref 0.35–4.94)
VLDLC SERPL CALC-MCNC: 29 MG/DL
WBC # SPEC AUTO: 6.5 X10(3)/MCL (ref 4.5–11.5)

## 2023-03-21 PROCEDURE — 85025 COMPLETE CBC W/AUTO DIFF WBC: CPT

## 2023-03-21 PROCEDURE — 84443 ASSAY THYROID STIM HORMONE: CPT

## 2023-03-21 PROCEDURE — 80061 LIPID PANEL: CPT

## 2023-03-21 PROCEDURE — 82550 ASSAY OF CK (CPK): CPT

## 2023-03-21 PROCEDURE — 83036 HEMOGLOBIN GLYCOSYLATED A1C: CPT

## 2023-03-21 PROCEDURE — 36415 COLL VENOUS BLD VENIPUNCTURE: CPT

## 2023-03-21 PROCEDURE — 80053 COMPREHEN METABOLIC PANEL: CPT

## 2023-08-23 ENCOUNTER — HOSPITAL ENCOUNTER (OUTPATIENT)
Facility: HOSPITAL | Age: 88
LOS: 5 days | Discharge: NURSING, REHAB OR LONG-TERM CARE | End: 2023-08-31
Attending: STUDENT IN AN ORGANIZED HEALTH CARE EDUCATION/TRAINING PROGRAM | Admitting: INTERNAL MEDICINE
Payer: MEDICARE

## 2023-08-23 DIAGNOSIS — R53.1 GENERALIZED WEAKNESS: Primary | ICD-10-CM

## 2023-08-23 DIAGNOSIS — R53.1 WEAKNESS: ICD-10-CM

## 2023-08-23 DIAGNOSIS — R79.89 ELEVATED TROPONIN: ICD-10-CM

## 2023-08-23 LAB
ALBUMIN SERPL-MCNC: 3.6 G/DL (ref 3.4–4.8)
ALBUMIN/GLOB SERPL: 1.3 RATIO (ref 1.1–2)
ALP SERPL-CCNC: 47 UNIT/L (ref 40–150)
ALT SERPL-CCNC: 14 UNIT/L (ref 0–55)
APPEARANCE UR: CLEAR
AST SERPL-CCNC: 22 UNIT/L (ref 5–34)
BACTERIA #/AREA URNS AUTO: ABNORMAL /HPF
BASOPHILS # BLD AUTO: 0.04 X10(3)/MCL
BASOPHILS NFR BLD AUTO: 0.6 %
BILIRUB SERPL-MCNC: 0.5 MG/DL
BILIRUB UR QL STRIP.AUTO: NEGATIVE
BUN SERPL-MCNC: 29.5 MG/DL (ref 9.8–20.1)
CALCIUM SERPL-MCNC: 10.2 MG/DL (ref 8.4–10.2)
CHLORIDE SERPL-SCNC: 101 MMOL/L (ref 98–107)
CO2 SERPL-SCNC: 28 MMOL/L (ref 23–31)
COLOR UR: YELLOW
CREAT SERPL-MCNC: 1.02 MG/DL (ref 0.55–1.02)
EOSINOPHIL # BLD AUTO: 0.16 X10(3)/MCL (ref 0–0.9)
EOSINOPHIL NFR BLD AUTO: 2.4 %
ERYTHROCYTE [DISTWIDTH] IN BLOOD BY AUTOMATED COUNT: 15.6 % (ref 11.5–17)
FLUAV AG UPPER RESP QL IA.RAPID: NOT DETECTED
FLUBV AG UPPER RESP QL IA.RAPID: NOT DETECTED
GFR SERPLBLD CREATININE-BSD FMLA CKD-EPI: 53 MLS/MIN/1.73/M2
GLOBULIN SER-MCNC: 2.7 GM/DL (ref 2.4–3.5)
GLUCOSE SERPL-MCNC: 93 MG/DL (ref 82–115)
GLUCOSE UR QL STRIP.AUTO: NEGATIVE
HCT VFR BLD AUTO: 36.4 % (ref 37–47)
HGB BLD-MCNC: 12.1 G/DL (ref 12–16)
IMM GRANULOCYTES # BLD AUTO: 0.02 X10(3)/MCL (ref 0–0.04)
IMM GRANULOCYTES NFR BLD AUTO: 0.3 %
KETONES UR QL STRIP.AUTO: NEGATIVE
LEUKOCYTE ESTERASE UR QL STRIP.AUTO: ABNORMAL
LIPASE SERPL-CCNC: 22 U/L
LYMPHOCYTES # BLD AUTO: 1.25 X10(3)/MCL (ref 0.6–4.6)
LYMPHOCYTES NFR BLD AUTO: 18.5 %
MAGNESIUM SERPL-MCNC: 1.8 MG/DL (ref 1.6–2.6)
MCH RBC QN AUTO: 26.4 PG (ref 27–31)
MCHC RBC AUTO-ENTMCNC: 33.2 G/DL (ref 33–36)
MCV RBC AUTO: 79.3 FL (ref 80–94)
MONOCYTES # BLD AUTO: 0.64 X10(3)/MCL (ref 0.1–1.3)
MONOCYTES NFR BLD AUTO: 9.5 %
NEUTROPHILS # BLD AUTO: 4.63 X10(3)/MCL (ref 2.1–9.2)
NEUTROPHILS NFR BLD AUTO: 68.7 %
NITRITE UR QL STRIP.AUTO: NEGATIVE
NRBC BLD AUTO-RTO: 0 %
PH UR STRIP.AUTO: 7.5 [PH]
PLATELET # BLD AUTO: 280 X10(3)/MCL (ref 130–400)
PMV BLD AUTO: 11.3 FL (ref 7.4–10.4)
POTASSIUM SERPL-SCNC: 3.9 MMOL/L (ref 3.5–5.1)
PROT SERPL-MCNC: 6.3 GM/DL (ref 5.8–7.6)
PROT UR QL STRIP.AUTO: NEGATIVE
RBC # BLD AUTO: 4.59 X10(6)/MCL (ref 4.2–5.4)
RBC #/AREA URNS AUTO: <5 /HPF
RBC UR QL AUTO: NEGATIVE
SARS-COV-2 RNA RESP QL NAA+PROBE: NOT DETECTED
SODIUM SERPL-SCNC: 138 MMOL/L (ref 136–145)
SP GR UR STRIP.AUTO: 1.01 (ref 1–1.03)
SQUAMOUS #/AREA URNS AUTO: <5 /HPF
TROPONIN I SERPL-MCNC: 0.07 NG/ML (ref 0–0.04)
TROPONIN I SERPL-MCNC: <0.01 NG/ML (ref 0–0.04)
UROBILINOGEN UR STRIP-ACNC: 0.2
WBC # SPEC AUTO: 6.74 X10(3)/MCL (ref 4.5–11.5)
WBC #/AREA URNS AUTO: 6 /HPF

## 2023-08-23 PROCEDURE — 25000003 PHARM REV CODE 250: Performed by: STUDENT IN AN ORGANIZED HEALTH CARE EDUCATION/TRAINING PROGRAM

## 2023-08-23 PROCEDURE — 0240U COVID/FLU A&B PCR: CPT | Performed by: PHYSICIAN ASSISTANT

## 2023-08-23 PROCEDURE — 93010 ELECTROCARDIOGRAM REPORT: CPT | Mod: ,,, | Performed by: INTERNAL MEDICINE

## 2023-08-23 PROCEDURE — 81001 URINALYSIS AUTO W/SCOPE: CPT | Performed by: PHYSICIAN ASSISTANT

## 2023-08-23 PROCEDURE — 93005 ELECTROCARDIOGRAM TRACING: CPT

## 2023-08-23 PROCEDURE — 84484 ASSAY OF TROPONIN QUANT: CPT | Performed by: PHYSICIAN ASSISTANT

## 2023-08-23 PROCEDURE — 85025 COMPLETE CBC W/AUTO DIFF WBC: CPT | Performed by: PHYSICIAN ASSISTANT

## 2023-08-23 PROCEDURE — 63600175 PHARM REV CODE 636 W HCPCS: Performed by: PHYSICIAN ASSISTANT

## 2023-08-23 PROCEDURE — 21400001 HC TELEMETRY ROOM

## 2023-08-23 PROCEDURE — 83690 ASSAY OF LIPASE: CPT | Performed by: PHYSICIAN ASSISTANT

## 2023-08-23 PROCEDURE — 11000001 HC ACUTE MED/SURG PRIVATE ROOM

## 2023-08-23 PROCEDURE — 83735 ASSAY OF MAGNESIUM: CPT | Performed by: PHYSICIAN ASSISTANT

## 2023-08-23 PROCEDURE — 80053 COMPREHEN METABOLIC PANEL: CPT | Performed by: PHYSICIAN ASSISTANT

## 2023-08-23 PROCEDURE — 84484 ASSAY OF TROPONIN QUANT: CPT | Performed by: STUDENT IN AN ORGANIZED HEALTH CARE EDUCATION/TRAINING PROGRAM

## 2023-08-23 PROCEDURE — 99285 EMERGENCY DEPT VISIT HI MDM: CPT | Mod: 25

## 2023-08-23 PROCEDURE — 93010 EKG 12-LEAD: ICD-10-PCS | Mod: ,,, | Performed by: INTERNAL MEDICINE

## 2023-08-23 RX ORDER — SODIUM CHLORIDE 0.9 % (FLUSH) 0.9 %
10 SYRINGE (ML) INJECTION
Status: DISCONTINUED | OUTPATIENT
Start: 2023-08-23 | End: 2023-08-31 | Stop reason: HOSPADM

## 2023-08-23 RX ORDER — NAPROXEN SODIUM 220 MG/1
364 TABLET, FILM COATED ORAL
Status: COMPLETED | OUTPATIENT
Start: 2023-08-23 | End: 2023-08-23

## 2023-08-23 RX ORDER — TALC
6 POWDER (GRAM) TOPICAL NIGHTLY PRN
Status: DISCONTINUED | OUTPATIENT
Start: 2023-08-23 | End: 2023-08-31 | Stop reason: HOSPADM

## 2023-08-23 RX ADMIN — ASPIRIN 81 MG CHEWABLE TABLET 324 MG: 81 TABLET CHEWABLE at 05:08

## 2023-08-23 RX ADMIN — SODIUM CHLORIDE, POTASSIUM CHLORIDE, SODIUM LACTATE AND CALCIUM CHLORIDE 1000 ML: 600; 310; 30; 20 INJECTION, SOLUTION INTRAVENOUS at 02:08

## 2023-08-23 NOTE — ED PROVIDER NOTES
Encounter Date: 8/23/2023    SCRIBE #1 NOTE: I, Apolinar Branch, am scribing for, and in the presence of,  Dr. Leonardo. I have scribed the following portions of the note - the EKG reading. Other sections scribed: HPI, ROS, Physical Exam, MDM, Attending.       History     Chief Complaint   Patient presents with    Fatigue     Pt c/o generalized weakness for the last few days. Denies chest pain, sob. Reports hypotension at home this morning. /50     87 y/o female with history of HTN, HLD, DM, Crohn's, anemia and CAD s/p stents presents to ED for generalized weakness onset a few days ago.  Pt had trip and fall 2 days ago with -LOC.  She has been eating and drinking well.  She has pacemaker.  Pt denies chest pain, fever, chills or syncope.  Her PCP is Dr. Briceño and cardiologist is Dr. Valentino.    The history is provided by the patient and a relative.     Review of patient's allergies indicates:   Allergen Reactions    Centratex [iron-folic acid-mv, min cmb#15]     Cephalexin     Erythromycin     Hydroxyzine     Iodinated contrast media     Iodine     Naldecon dx     Penicillins Other (See Comments)     unknown    Tetanus vaccines and toxoid     Vioxx [rofecoxib]      Past Medical History:   Diagnosis Date    Anemia, unspecified     CAD (coronary artery disease)     Crohn disease     DM (diabetes mellitus)     History of coronary angioplasty with insertion of stent     HLD (hyperlipidemia)     HTN (hypertension)     Memory loss      No past surgical history on file.  No family history on file.     Review of Systems   Constitutional:  Negative for chills and fever.        Generalized weakness    Cardiovascular:  Negative for chest pain.   Neurological:  Negative for syncope.       Physical Exam     Initial Vitals [08/23/23 1339]   BP Pulse Resp Temp SpO2   (!) 105/50 (!) 120 16 97.7 °F (36.5 °C) 95 %      MAP       --         Physical Exam    Nursing note and vitals reviewed.  Constitutional: She appears  well-developed and well-nourished. She is not diaphoretic. No distress.   HENT:   Head: Normocephalic and atraumatic.   Right Ear: External ear normal.   Left Ear: External ear normal.   Nose: Nose normal.   Eyes: Conjunctivae and EOM are normal. Pupils are equal, round, and reactive to light. Right eye exhibits no discharge. Left eye exhibits no discharge.   Cardiovascular:  Normal rate, regular rhythm, normal heart sounds and intact distal pulses.     Exam reveals no gallop and no friction rub.       No murmur heard.  Pulmonary/Chest: Breath sounds normal. No respiratory distress. She has no wheezes. She has no rhonchi. She has no rales. She exhibits no tenderness.   Pacemaker to L upper chest    Abdominal: Abdomen is soft. Bowel sounds are normal. She exhibits no distension and no mass. There is no abdominal tenderness. There is no rebound and no guarding.   Musculoskeletal:         General: No edema. Normal range of motion.      Comments: Generally weak      Neurological: She is alert and oriented to person, place, and time. No cranial nerve deficit or sensory deficit.   Skin: Skin is warm and dry. Capillary refill takes less than 2 seconds. No erythema. No pallor.         ED Course   Procedures  Labs Reviewed   COMPREHENSIVE METABOLIC PANEL - Abnormal; Notable for the following components:       Result Value    Blood Urea Nitrogen 29.5 (*)     All other components within normal limits   URINALYSIS, REFLEX TO URINE CULTURE - Abnormal; Notable for the following components:    Leukocyte Esterase, UA 1+ (*)     All other components within normal limits   TROPONIN I - Abnormal; Notable for the following components:    Troponin-I 0.069 (*)     All other components within normal limits   CBC WITH DIFFERENTIAL - Abnormal; Notable for the following components:    Hct 36.4 (*)     MCV 79.3 (*)     MCH 26.4 (*)     MPV 11.3 (*)     All other components within normal limits   URINALYSIS, MICROSCOPIC - Abnormal; Notable for  the following components:    WBC, UA 6 (*)     All other components within normal limits   COVID/FLU A&B PCR - Normal    Narrative:     The Xpert Xpress SARS-CoV-2/FLU/RSV plus is a rapid, multiplexed real-time PCR test intended for the simultaneous qualitative detection and differentiation of SARS-CoV-2, Influenza A, Influenza B, and respiratory syncytial virus (RSV) viral RNA in either nasopharyngeal swab or nasal swab specimens.         LIPASE - Normal   MAGNESIUM - Normal   CBC W/ AUTO DIFFERENTIAL    Narrative:     The following orders were created for panel order CBC auto differential.  Procedure                               Abnormality         Status                     ---------                               -----------         ------                     CBC with Differential[[id number]]        Abnormal            Final result                 Please view results for these tests on the individual orders.   TROPONIN I     EKG Readings: (Independently Interpreted)   Initial Reading: No STEMI. Rhythm: Paced Rhythm (ventricularly). Heart Rate: 69. Ectopy: No Ectopy. Conduction: Normal. ST Segments: Normal ST Segments. T Waves: Normal. Axis: Normal. [facility] ED EKG2 - Other Findings: QTc 471ms.   1340  Similar to prior EKG       ECG Results              EKG 12-lead (Final result)  Result time 08/23/23 19:26:47      Final result by Interface, Lab In [facility] (08/23/23 19:26:47)                   Narrative:    Test Reason : R53.1,    Vent. Rate : 069 BPM     Atrial Rate : 416 BPM     P-R Int : 000 ms          QRS Dur : 162 ms      QT Int : 440 ms       P-R-T Axes : 000 -57 119 degrees     QTc Int : 471 ms    Ventricular-paced rhythm  Abnormal ECG  When compared with ECG of 30-JUL-2012 15:38,  Vent. rate has decreased BY   4 BPM  Confirmed by Otilio Ratliff MD ([id number]) on 8/23/2023 7:26:41 PM    Referred By: [email or web address]   SELF           Confirmed By:Otilio Ratliff MD                                  Imaging Results               X-Ray Chest 1 View (Final result)  Result time 08/23/23 14:42:10      Final result by Augustin Blackmon MD (08/23/23 14:42:10)                   Impression:      NO ACUTE CARDIOPULMONARY PROCESS IDENTIFIED.      Electronically signed by: Augustin Blackmon  Date:    08/23/2023  Time:    14:42               Narrative:    EXAMINATION:  XR CHEST 1 VIEW    CLINICAL HISTORY:  Weakness    TECHNIQUE:  One view    COMPARISON:  January 15, 2021.    FINDINGS:  Cardiopericardial silhouette appearance is similar.  Cardiac device electrodes terminate within the right atrium and the right ventricle.  Left lower lung zone linear atelectasis or scarring.  No acute dense focal or segmental consolidation, congestive process, pleural effusions or pneumothorax.                                       Medications   sodium chloride 0.9% flush 10 mL (has no administration in time range)   melatonin tablet 6 mg (has no administration in time range)   lactated ringers bolus 1,000 mL (1,000 mLs Intravenous New Bag 8/23/23 1436)   aspirin chewable tablet 324 mg (324 mg Oral Given 8/23/23 1728)     Medical Decision Making  Patient presents generalized weakness.      Differential diagnoses include, but are not limited to:  ACS, electrolyte abnormality, renal dysfunction, generalized weakness, dehydration, advanced age, deconditioning    Patient is awake alert well-appearing.  Generally weak.  Vitals were fairly stable.  Her labs are revealing for an elevated troponin.  EKGs the patient rhythm without acute changes when compared to prior EKG.  Her weakness may be related to either her age or her elevated troponin.  We will consult Cardiology.  Admitted to primary care physician.  Patient family room amenable to plan.  Care transferred.    Amount and/or Complexity of Data Reviewed  External Data Reviewed: notes.     Details: Chart review shows that patient was seen at outside hospital 08/16/2023.  Presented after a trip and fall.  Reported that time she  had been lightheaded since the fall had a bruise to both elbows.  Unsure if she lost her head.  Did not pass out.  On Eliquis.  CT head was unremarkable as was CT neck.  Plain films of lumbar spine elbow unremarkable.  Labs: ordered.  Radiology: ordered.  ECG/medicine tests: ordered.    Risk  OTC drugs.  Prescription drug management.  Decision regarding hospitalization.            Scribe Attestation:   Scribe #1: I performed the above scribed service and the documentation accurately describes the services I performed. I attest to the accuracy of the note.    Attending Attestation:           Physician Attestation for Scribe:  Physician Attestation Statement for Scribe #1: I, reviewed documentation, as scribed by Apolinar Branch in my presence, and it is both accurate and complete.             ED Course as of 08/23/23 2058   Wed Aug 23, 2023   2056 Troponin I(!): 0.069 [MM]   2056 Magnesium: 1.80 [MM]   2056 Comprehensive metabolic panel(!)  Mildly increased BUN.  No electrolyte abnormality or other renal dysfunction noted. [MM]   2056 CBC auto differential(!)  No anemia or leukocytosis. [MM]   2056 Lipase: 22 [MM]   2056 Urinalysis, Microscopic(!)  No evidence of UTI [MM]   2056 COVID/FLU A&B PCR  Flu COVID negative [MM]      ED Course User Index  [MM] Arron Leonardo MD                    Clinical Impression:   Final diagnoses:  [R53.1] Weakness  [R53.1] Generalized weakness (Primary)  [R77.8] Elevated troponin        ED Disposition Condition    Admit Stable                Arron Leonardo MD  08/23/23 2058

## 2023-08-23 NOTE — Clinical Note
Diagnosis: Elevated troponin [353730]   Admitting Provider:: FERMÍN CAMP [[id number]]   Future Attending Provider: MARILEE DUTTA [[id number]]   Reason for IP Medical Treatment  (Clinical interventions that can only be accomplished in the IP setting? ) :: elevated trop, card consult   I certify that Inpatient services for greater than or equal to 2 midnights are medically necessary:: Yes   Plans for Post-Acute care--if anticipated (pick the single best option):: A. No post acute care anticipated at this time

## 2023-08-24 PROBLEM — R79.89 ELEVATED TROPONIN: Status: ACTIVE | Noted: 2023-08-24

## 2023-08-24 PROBLEM — R79.89 ELEVATED TROPONIN: Status: RESOLVED | Noted: 2023-08-24 | Resolved: 2023-08-24

## 2023-08-24 LAB
POCT GLUCOSE: 201 MG/DL (ref 70–110)
POCT GLUCOSE: 216 MG/DL (ref 70–110)
POCT GLUCOSE: 235 MG/DL (ref 70–110)

## 2023-08-24 PROCEDURE — 63600175 PHARM REV CODE 636 W HCPCS: Performed by: INTERNAL MEDICINE

## 2023-08-24 PROCEDURE — 94761 N-INVAS EAR/PLS OXIMETRY MLT: CPT

## 2023-08-24 PROCEDURE — 25000003 PHARM REV CODE 250: Performed by: INTERNAL MEDICINE

## 2023-08-24 PROCEDURE — 21400001 HC TELEMETRY ROOM

## 2023-08-24 RX ORDER — B-COMPLEX WITH VITAMIN C
1 TABLET ORAL DAILY
COMMUNITY

## 2023-08-24 RX ORDER — IBUPROFEN 200 MG
16 TABLET ORAL
Status: DISCONTINUED | OUTPATIENT
Start: 2023-08-24 | End: 2023-08-24 | Stop reason: SDUPTHER

## 2023-08-24 RX ORDER — SPIRONOLACTONE 25 MG/1
25 TABLET ORAL DAILY
Status: DISCONTINUED | OUTPATIENT
Start: 2023-08-24 | End: 2023-08-31 | Stop reason: HOSPADM

## 2023-08-24 RX ORDER — MESALAMINE 400 MG/1
400 CAPSULE, DELAYED RELEASE ORAL 2 TIMES DAILY
Status: DISCONTINUED | OUTPATIENT
Start: 2023-08-24 | End: 2023-08-31 | Stop reason: HOSPADM

## 2023-08-24 RX ORDER — GLIPIZIDE 5 MG/1
5 TABLET ORAL
Status: DISCONTINUED | OUTPATIENT
Start: 2023-08-25 | End: 2023-08-26

## 2023-08-24 RX ORDER — INSULIN ASPART 100 [IU]/ML
0-5 INJECTION, SOLUTION INTRAVENOUS; SUBCUTANEOUS
Status: DISCONTINUED | OUTPATIENT
Start: 2023-08-24 | End: 2023-08-31 | Stop reason: HOSPADM

## 2023-08-24 RX ORDER — LANOLIN ALCOHOL/MO/W.PET/CERES
1 CREAM (GRAM) TOPICAL DAILY
COMMUNITY

## 2023-08-24 RX ORDER — BUDESONIDE 3 MG/1
9 CAPSULE, COATED PELLETS ORAL DAILY
Status: DISCONTINUED | OUTPATIENT
Start: 2023-08-24 | End: 2023-08-31 | Stop reason: HOSPADM

## 2023-08-24 RX ORDER — CARVEDILOL 12.5 MG/1
12.5 TABLET ORAL 2 TIMES DAILY
Status: DISCONTINUED | OUTPATIENT
Start: 2023-08-24 | End: 2023-08-31 | Stop reason: HOSPADM

## 2023-08-24 RX ORDER — ONDANSETRON 4 MG/1
4 TABLET, ORALLY DISINTEGRATING ORAL EVERY 6 HOURS PRN
Status: DISCONTINUED | OUTPATIENT
Start: 2023-08-24 | End: 2023-08-31 | Stop reason: HOSPADM

## 2023-08-24 RX ORDER — GLUCOSAM/CHONDRO/HERB 149/HYAL 750-100 MG
1 TABLET ORAL 2 TIMES DAILY
Status: DISCONTINUED | OUTPATIENT
Start: 2023-08-24 | End: 2023-08-31 | Stop reason: HOSPADM

## 2023-08-24 RX ORDER — IBUPROFEN 200 MG
24 TABLET ORAL
Status: DISCONTINUED | OUTPATIENT
Start: 2023-08-24 | End: 2023-08-24 | Stop reason: SDUPTHER

## 2023-08-24 RX ORDER — INSULIN ASPART 100 [IU]/ML
20 INJECTION, SUSPENSION SUBCUTANEOUS
Status: DISCONTINUED | OUTPATIENT
Start: 2023-08-25 | End: 2023-08-26

## 2023-08-24 RX ORDER — IBUPROFEN 200 MG
24 TABLET ORAL
Status: DISCONTINUED | OUTPATIENT
Start: 2023-08-24 | End: 2023-08-31 | Stop reason: HOSPADM

## 2023-08-24 RX ORDER — IBUPROFEN 200 MG
16 TABLET ORAL
Status: DISCONTINUED | OUTPATIENT
Start: 2023-08-24 | End: 2023-08-31 | Stop reason: HOSPADM

## 2023-08-24 RX ORDER — CARVEDILOL 12.5 MG/1
12.5 TABLET ORAL 2 TIMES DAILY
COMMUNITY
Start: 2023-05-04

## 2023-08-24 RX ORDER — LISINOPRIL 10 MG/1
10 TABLET ORAL DAILY
Status: DISCONTINUED | OUTPATIENT
Start: 2023-08-24 | End: 2023-08-31 | Stop reason: HOSPADM

## 2023-08-24 RX ORDER — FUROSEMIDE 40 MG/1
40 TABLET ORAL DAILY
Status: DISCONTINUED | OUTPATIENT
Start: 2023-08-24 | End: 2023-08-30

## 2023-08-24 RX ORDER — MESALAMINE 500 MG/1
500 CAPSULE, EXTENDED RELEASE ORAL 2 TIMES DAILY
COMMUNITY

## 2023-08-24 RX ORDER — GLUCAGON 1 MG
1 KIT INJECTION
Status: DISCONTINUED | OUTPATIENT
Start: 2023-08-24 | End: 2023-08-24 | Stop reason: SDUPTHER

## 2023-08-24 RX ORDER — GLUCAGON 1 MG
1 KIT INJECTION
Status: DISCONTINUED | OUTPATIENT
Start: 2023-08-24 | End: 2023-08-31 | Stop reason: HOSPADM

## 2023-08-24 RX ORDER — LANOLIN ALCOHOL/MO/W.PET/CERES
1 CREAM (GRAM) TOPICAL DAILY
Status: DISCONTINUED | OUTPATIENT
Start: 2023-08-24 | End: 2023-08-31 | Stop reason: HOSPADM

## 2023-08-24 RX ORDER — FENOFIBRATE 145 MG/1
145 TABLET, FILM COATED ORAL DAILY
Status: DISCONTINUED | OUTPATIENT
Start: 2023-08-24 | End: 2023-08-31 | Stop reason: HOSPADM

## 2023-08-24 RX ORDER — SERTRALINE HYDROCHLORIDE 50 MG/1
100 TABLET, FILM COATED ORAL DAILY
Status: DISCONTINUED | OUTPATIENT
Start: 2023-08-24 | End: 2023-08-31 | Stop reason: HOSPADM

## 2023-08-24 RX ADMIN — LISINOPRIL 10 MG: 10 TABLET ORAL at 03:08

## 2023-08-24 RX ADMIN — FUROSEMIDE 40 MG: 40 TABLET ORAL at 03:08

## 2023-08-24 RX ADMIN — INSULIN ASPART 2 UNITS: 100 INJECTION, SOLUTION INTRAVENOUS; SUBCUTANEOUS at 05:08

## 2023-08-24 RX ADMIN — SERTRALINE HYDROCHLORIDE 100 MG: 50 TABLET ORAL at 03:08

## 2023-08-24 RX ADMIN — MESALAMINE 400 MG: 400 CAPSULE, DELAYED RELEASE ORAL at 08:08

## 2023-08-24 RX ADMIN — BUDESONIDE 9 MG: 3 CAPSULE, COATED PELLETS ORAL at 03:08

## 2023-08-24 RX ADMIN — FERROUS SULFATE TAB 325 MG (65 MG ELEMENTAL FE) 1 EACH: 325 (65 FE) TAB at 03:08

## 2023-08-24 RX ADMIN — FENOFIBRATE 145 MG: 145 TABLET, FILM COATED ORAL at 03:08

## 2023-08-24 RX ADMIN — APIXABAN 5 MG: 5 TABLET, FILM COATED ORAL at 08:08

## 2023-08-24 RX ADMIN — OMEGA-3 FATTY ACIDS CAP 1000 MG 1 CAPSULE: 1000 CAP at 08:08

## 2023-08-24 RX ADMIN — CARVEDILOL 12.5 MG: 12.5 TABLET, FILM COATED ORAL at 08:08

## 2023-08-24 RX ADMIN — SPIRONOLACTONE 25 MG: 25 TABLET ORAL at 03:08

## 2023-08-24 RX ADMIN — CARVEDILOL 12.5 MG: 12.5 TABLET, FILM COATED ORAL at 03:08

## 2023-08-24 NOTE — H&P
[facility]                       [address] [id number]    PATIENT NAME:       UZAIR HILARIO  YOB: 1935  CSN:                [id number]   MRN:                [id number]  ADMIT DATE:         08/23/2023 13:56:00  PHYSICIAN:          Joao Serrano MD                        HISTORY AND PHYSICAL      HISTORY OF PRESENT ILLNESS:  This is an 88-year-old white female.  She was taken   to the emergency room after she was feeling bad.  After I talked to the   daughter, she stated that she had a blood pressure in the high 90s systolic that   she had some weakness.  She has had several falls over the last few weeks.  She   denied having shortness of breath or chest pains.  She did state that she was   feeling fatigued as well as with generalized weakness.  She did have some nausea   and vomiting and some supposedly loss of consciousness and she was evaluated in   the walk-in clinic or emergency room and she was sent back home after that   several days ago.  She did have a slight increase in troponin initially in this   admission.  No acute chest pain.  No palpitations or other problems.    REVIEW OF SYSTEMS:  X12 as above.    PAST MEDICAL HISTORY:  Remarkable for anemia, coronary artery disease, diabetes   type 2, history of Crohn's, dyslipidemia, hypertension, memory loss, sick sinus   syndrome with a pacer, question of CHF, osteoarthritis.    PAST SURGICAL HISTORY:  Include supposedly angiogram and she had colonoscopies.    She had an angioplasty with a stent.  She had a pacemaker placed.  She has   history of CHF.  She had a CABG.    SOCIAL HISTORY:  She is a nonsmoker.  Denies any excessive alcohol.  No illegal   drugs.    FAMILY HISTORY:  Noncontributory.    ALLERGIES:  NUMEROUS INCLUDING ERYTHROMYCIN, CEPHALEXIN, HYDROXYZINE, IODINE,   NALDECON, PENICILLINS, TETANUS TOXOID, VIOXX.     MEDICATIONS:  Please  see MAR.    PHYSICAL EXAMINATION:  VITAL SIGNS:  Blood pressure 105/50, pulse 120, temperature 97.7.  GENERAL APPEARANCE:  She is alert, in no acute distress.  HEENT:  Normocephalic, atraumatic.  PERRLA.  EOMI.    NECK:  Supple.  There is no JVD, no bruits.  HEART:  She has irregular rhythm and rate.  LUNGS:  Clear.  ABDOMEN:  Soft, nontender.  Bowel sounds positive and normal genitalia.  RECTAL:  No discharge.  Normal for age.    EXTREMITIES:  No clubbing, cyanosis, or edema.  NEUROLOGIC:  Nonfocal.  Cranial nerves 2-12 are intact.    LABORATORY DATA:  White cell count 6.74, H and H 12.1 and 36.4 with microcytic   indices.  Platelet count is 280.  Her sodium 138, potassium 3.9, chloride 101,   CO2 is 28, BUN 29.5, creatinine 1.02, magnesium 1.8.  Troponin was initially   0.069.  Influenza negative.  SARS negative.  Urine unremarkable.  Chest x-ray   unremarkable.  EKG unremarkable.    IMPRESSION:    1. Generalized weakness with hypotension.  2. History of multiple falls.  3. Questionable chest pain.  4. She does have some cognitive issues.  5. She has history of atherosclerotic heart disease, status post stent and   history of CHF.  6. History of Crohn's disease.  7. History of osteoarthritis.  8. Dyslipidemia.  9. Valvular disease.  10. Congestive heart failure.  11. Iron deficiency.  12. Anemia.  13. She has had multiple falls over the last few weeks with a questionable loss   of consciousness, questionable syncope.    PLAN:  The patient admitted to the hospital.  We will get her medicines.  We   will start some sugar checks and DVT prophylaxis.  Medicines are not in the   chart yet.  Cardiac consult is pending.  We will trend   enzymes.  We will keep on telemetry.        ______________________________  MD NOELLE Hugo/SUSIE  DD:  08/24/2023  Time:  01:53PM  DT:  08/24/2023  Time:  02:47PM  Job #:  [id number]      HISTORY AND PHYSICAL

## 2023-08-24 NOTE — PLAN OF CARE
08/24/23 1614   Discharge Assessment   Assessment Type Discharge Planning Assessment   Confirmed/corrected address, phone number and insurance Yes   Confirmed Demographics Correct on Facesheet   Source of Information patient   When was your last doctors appointment?   (DR Briceño is PCP)   Communicated RAIN with patient/caregiver Date not available/Unable to determine   Reason For Admission Generalized Weakness, Weakness, elevated troponin   People in Home alone   Do you expect to return to your current living situation? Yes   Do you have help at home or someone to help you manage your care at home?   (has friend /family who come and help in the house and visit during the day on some days.)   Prior to hospitilization cognitive status: Alert/Oriented   Current cognitive status: Alert/Oriented   Walking or Climbing Stairs   (ambulates but has had some falls. Patient states sluips and falls sometimes.)   Dressing/Bathing   (Performs ADLS)   Do you have any problems with:   (family and friends help with errands and shopping)   Home Accessibility   (home has 3 steps to enter with railsi)   Home Layout Able to live on 1st floor   Equipment Currently Used at Home bedside commode;blood pressure machine;glucometer;cane, quad;rollator;walker, rolling   Patient currently being followed by outpatient case management? No   Do you currently have service(s) that help you manage your care at home? No   Do you take prescription medications? Yes   Do you have prescription coverage? Yes   Coverage [employer] Managed [employer] - [employer] \[facility] [place] [facility]\"" HMO PPO Special needs   Do you have any problems affording any of your prescribed medications? No   Is the patient taking medications as prescribed? yes   Who is going to help you get home at discharge? friend or family   How do you get to doctors appointments? family or friend will provide   Are you on dialysis? No   Do you take coumadin? No   DME Needed Upon Discharge  none   Discharge Plan  discussed with: Patient   Transition of Care Barriers None   Discharge Plan A Home  (home with [address])   Discharge Plan B Skilled Nursing Facility   Physical Activity   On average, how many days per week do you engage in moderate to strenuous exercise (like a brisk walk)? 0 days   On average, how many minutes do you engage in exercise at this level? 0 min   Financial Resource Strain   How hard is it for you to pay for the very basics like food, housing, medical care, and heating? Not hard   Housing Stability   In the last 12 months, was there a time when you were not able to pay the mortgage or rent on time? N   In the last 12 months, how many places have you lived? 1   In the last 12 months, was there a time when you did not have a steady place to sleep or slept in a shelter (including now)? N   Transportation Needs   In the past 12 months, has lack of transportation kept you from medical appointments or from getting medications? no   In the past 12 months, has lack of transportation kept you from meetings, work, or from getting things needed for daily living? No   Food Insecurity   Within the past 12 months, you worried that your food would run out before you got the money to buy more. Never true   Within the past 12 months, the food you bought just didn't last and you didn't have money to get more. Never true   Stress   Do you feel stress - tense, restless, nervous, or anxious, or unable to sleep at night because your mind is troubled all the time - these days? Only a littl   Social Connections   In a typical week, how many times do you talk on the phone with family, friends, or neighbors? More than 3   How often do you get together with friends or relatives? More than 3   How often do you attend Jain or Shinto services? Never   Do you belong to any clubs or organizations such as Jain groups, unions, fraternal or athletic groups, or school groups? No   How often do you attend meetings of the  clubs or organizations you belong to? Never   Are you , , , , never , or living with a partner?    Alcohol Use   Q1: How often do you have a drink containing alcohol? Never   Q2: How many drinks containing alcohol do you have on a typical day when you are drinking? None   Q3: How often do you have six or more drinks on one occasion? Never

## 2023-08-24 NOTE — NURSING
Nurses Note -- 4 Eyes      8/24/2023   11:58 AM      Skin assessed during: daily assessment       [] No Altered Skin Integrity Present    []Prevention Measures Documented      [x] Yes- Altered Skin Integrity Present or Discovered   [x] LDA Added if Not in Epic (Describe Wound)   [x] New Altered Skin Integrity was Present on Admit and Documented in LDA   [x] Wound Image Taken    Wound Care Consulted? Yes    Attending Nurse:  Geraldine Hurtado RN/Staff Member:   THERESE Bobo

## 2023-08-24 NOTE — PROGRESS NOTES
[facility] [address]  Wound Care    Patient Name:  Denny Mai   MRN:  [id number]  Date: 8/24/2023  Diagnosis: Elevated troponin    History:     Past Medical History:   Diagnosis Date    Anemia, unspecified     CAD (coronary artery disease)     Crohn disease     DM (diabetes mellitus)     History of coronary angioplasty with insertion of stent     HLD (hyperlipidemia)     HTN (hypertension)     Memory loss        Social History     Socioeconomic History    Marital status: Single       Precautions:     Allergies as of 08/23/2023 - Reviewed 08/23/2023   Allergen Reaction Noted    Centratex [iron-folic acid-mv, min cmb#15]  09/25/2022    Cephalexin  09/25/2022    Erythromycin  09/25/2022    Hydroxyzine  09/25/2022    Iodinated contrast media  09/25/2022    Iodine  09/25/2022    Naldecon dx  09/25/2022    Penicillins Other (See Comments) 09/25/2022    Tetanus vaccines and toxoid  09/25/2022    Vioxx [rofecoxib]  09/25/2022       WOC Assessment Details/Treatment     Initial visit regarding area of erythema over sacrum and buttocks, patient is currently seated up at sofa and demonstrated ability to mobilize self to off load her bony prominences. She has a bordered silicone sacral foam dressing over her affected area at her sacrum/buttocks. The area of erythema appears to be resolving well. Reviewed pressure injury prevention measures with patient and family members X 2 , who all verbalize understanding.   08/24/23 1400        Altered Skin Integrity 08/24/23 1400 Sacral spine Intact skin with non-blanchable redness of localized area   Date First Assessed/Time First Assessed: 08/24/23 1400   Altered Skin Integrity Present on Admission - Did Patient arrive to the hospital with altered skin?: yes  Location: Sacral spine  Description of Altered Skin Integrity: Intact skin with non-blan...   Wound Image    Description of Altered Skin Integrity Intact skin with non-blanchable redness of  localized area   Dressing Appearance Dry;Intact;Clean   Drainage Amount None   Appearance Pink;Intact   Tissue loss description Not applicable   Red (%), Wound Tissue Color 80 %   Periwound Area Intact;Dry   Wound Edges Undefined   Dressing Reinforced  (resolving area of erythema over sacrum.)   Dressing Change Due 08/25/23         Recommendations made to primary team  assigned nurse Mirtha RN , for local wound care and pressure injury prevention measures , to include use of chair cushion.  Orders placed.     08/24/2023

## 2023-08-24 NOTE — CONSULTS
[facility] [address] [facility] Tele[facility]    Cardiology  Consult Note    Patient Name: Denny Mai  MRN: [id number]  Admission Date: 8/23/2023  Hospital Length of Stay: 1 days  Code Status: Full Code   Attending Provider: Destin Briceño MD   Consulting Provider: Jose Barfield MD  Primary Care Physician: Destin Briceño MD  Principal Problem:<principal problem not specified>    Patient information was obtained from patient, relative(s), and ER records.     Subjective:     Chief Complaint:  fatigue     HPI:   87 y/o female who is followed by Dr Valentino and unknown to [facility]; with history of HTN, HLD, DM, Crohn's, anemia, CAD s/p stents, and pacemaker presents to ED for generalized weakness onset a few days ago.  Pt had trip and fall 2 days ago with -LOC.  Has been feeling fatigued since her fall. Admission /50, , O2 95%. Initial lab work showed elevated trop 0.069. Pt denies any acute chest pain, palpitations, or BLE edema. [facility] consulted for elevated Trop levels.     Past Medical History:   Diagnosis Date    Anemia, unspecified     CAD (coronary artery disease)     Crohn disease     DM (diabetes mellitus)     History of coronary angioplasty with insertion of stent     HLD (hyperlipidemia)     HTN (hypertension)     Memory loss      Review of patient's allergies indicates:   Allergen Reactions    Centratex [iron-folic acid-mv, min cmb#15]     Cephalexin     Erythromycin     Hydroxyzine     Iodinated contrast media     Iodine     Naldecon dx     Penicillins Other (See Comments)     unknown    Tetanus vaccines and toxoid     Vioxx [rofecoxib]      No current facility-administered medications on file prior to encounter.     Current Outpatient Medications on File Prior to Encounter   Medication Sig    ACCU-CHEK SMARTVIEW TEST STRIP Strp     B-complex with vitamin C (Z-BEC OR EQUIV) tablet Take 1 tablet by mouth once daily.    budesonide (ENTOCORT EC) 3 mg capsule Take 9 mg by mouth once  "daily.    carvediloL (COREG) 12.5 MG tablet Take 12.5 mg by mouth 2 (two) times a day.    DROPLET INSULIN SYRINGE 0.3 mL 31 gauge x 5/16" Syrg     ELIQUIS 5 mg Tab     fenofibrate 160 MG Tab Take 160 mg by mouth once daily.    ferrous sulfate (FEOSOL) Tab tablet Take 1 tablet by mouth once daily.    furosemide (LASIX) 40 MG tablet     glipiZIDE (GLUCOTROL) 10 MG tablet     mesalamine (PENTASA) 500 MG CR capsule Take 500 mg by mouth 2 (two) times a day.    NOVOLOG MIX 70-30 U-100 INSULN 100 unit/mL (70-30) Soln Inject into the skin.    omega-3 fatty acids-fish oil 684-1,200 mg CpDR Take 1 capsule by mouth once daily.    ondansetron (ZOFRAN) 4 MG tablet Take 1 tablet (4 mg total) by mouth every 6 (six) hours.    quinapriL (ACCUPRIL) 20 MG tablet     sertraline (ZOLOFT) 100 MG tablet     spironolactone (ALDACTONE) 25 MG tablet Take 25 mg by mouth once daily.     Family History    None       Tobacco Use    Smoking status: Not on file    Smokeless tobacco: Not on file   Substance and Sexual Activity    Alcohol use: Not on file    Drug use: Not on file    Sexual activity: Not on file     Review of Systems   Constitutional:  Positive for fatigue. Negative for chills and fever.   Respiratory:  Negative for chest tightness and shortness of breath.    Cardiovascular:  Negative for chest pain, palpitations and leg swelling.   Gastrointestinal:  Positive for abdominal pain.   Neurological:  Positive for weakness and light-headedness.     Objective:     Vital Signs (Most Recent):  Temp: 98 °F (36.7 °C) (08/24/23 0729)  Pulse: 69 (08/24/23 0729)  Resp: 18 (08/24/23 0729)  BP: 124/63 (08/24/23 0729)  SpO2: (!) 94 % (08/24/23 0729) Vital Signs (24h Range):  Temp:  [97.1 °F (36.2 °C)-98 °F (36.7 °C)] 98 °F (36.7 °C)  Pulse:  [] 69  Resp:  [16-25] 18  SpO2:  [91 %-98 %] 94 %  BP: ()/(50-81) 124/63     Weight: 76.7 kg (169 lb)  There is no height or weight on file to calculate BMI.    SpO2: (!) 94 %       No intake or " output data in the 24 hours ending 08/24/23 1144    Lines/Drains/Airways       Peripheral Intravenous Line  Duration                  Peripheral IV - Single Lumen 08/23/23 1300 20 G Left Antecubital <1 day                  Significant Labs:  Recent Results (from the past 72 hour(s))   Comprehensive metabolic panel    Collection Time: 08/23/23  2:18 PM   Result Value Ref Range    Sodium Level 138 136 - 145 mmol/L    Potassium Level 3.9 3.5 - 5.1 mmol/L    Chloride 101 98 - 107 mmol/L    Carbon Dioxide 28 23 - 31 mmol/L    Glucose Level 93 82 - 115 mg/dL    Blood Urea Nitrogen 29.5 (H) 9.8 - 20.1 mg/dL    Creatinine 1.02 0.55 - 1.02 mg/dL    Calcium Level Total 10.2 8.4 - 10.2 mg/dL    Protein Total 6.3 5.8 - 7.6 gm/dL    Albumin Level 3.6 3.4 - 4.8 g/dL    Globulin 2.7 2.4 - 3.5 gm/dL    Albumin/Globulin Ratio 1.3 1.1 - 2.0 ratio    Bilirubin Total 0.5 <=1.5 mg/dL    Alkaline Phosphatase 47 40 - 150 unit/L    Alanine Aminotransferase 14 0 - 55 unit/L    Aspartate Aminotransferase 22 5 - 34 unit/L    eGFR 53 mls/min/1.73/m2   Lipase    Collection Time: 08/23/23  2:18 PM   Result Value Ref Range    Lipase Level 22 <=60 U/L   Troponin I    Collection Time: 08/23/23  2:18 PM   Result Value Ref Range    Troponin-I 0.069 (H) 0.000 - 0.045 ng/mL   Magnesium    Collection Time: 08/23/23  2:18 PM   Result Value Ref Range    Magnesium Level 1.80 1.60 - 2.60 mg/dL   CBC with Differential    Collection Time: 08/23/23  2:18 PM   Result Value Ref Range    WBC 6.74 4.50 - 11.50 x10(3)/mcL    RBC 4.59 4.20 - 5.40 x10(6)/mcL    Hgb 12.1 12.0 - 16.0 g/dL    Hct 36.4 (L) 37.0 - 47.0 %    MCV 79.3 (L) 80.0 - 94.0 fL    MCH 26.4 (L) 27.0 - 31.0 pg    MCHC 33.2 33.0 - 36.0 g/dL    RDW 15.6 11.5 - 17.0 %    Platelet 280 130 - 400 x10(3)/mcL    MPV 11.3 (H) 7.4 - 10.4 fL    Neut % 68.7 %    Lymph % 18.5 %    Mono % 9.5 %    Eos % 2.4 %    Basophil % 0.6 %    Lymph # 1.25 0.6 - 4.6 x10(3)/mcL    Neut # 4.63 2.1 - 9.2 x10(3)/mcL    Mono #  0.64 0.1 - 1.3 x10(3)/mcL    Eos # 0.16 0 - 0.9 x10(3)/mcL    Baso # 0.04 <=0.2 x10(3)/mcL    IG# 0.02 0 - 0.04 x10(3)/mcL    IG% 0.3 %    NRBC% 0.0 %   COVID/FLU A&B PCR    Collection Time: 08/23/23  2:34 PM   Result Value Ref Range    Influenza A PCR Not Detected Not Detected    Influenza B PCR Not Detected Not Detected    SARS-CoV-2 PCR Not Detected Not Detected, Negative, Invalid   Urinalysis, Reflex to Urine Culture    Collection Time: 08/23/23  3:19 PM    Specimen: Urine   Result Value Ref Range    Color, UA Yellow Yellow, Light-Yellow, Dark Yellow, Monique, Straw    Appearance, UA Clear Clear    Specific Gravity, UA 1.006 1.005 - 1.030    pH, UA 7.5 5.0 - 8.5    Protein, UA Negative Negative    Glucose, UA Negative Negative, Normal    Ketones, UA Negative Negative    Blood, UA Negative Negative    Bilirubin, UA Negative Negative    Urobilinogen, UA 0.2 0.2, 1.0, Normal    Nitrites, UA Negative Negative    Leukocyte Esterase, UA 1+ (A) Negative   Urinalysis, Microscopic    Collection Time: 08/23/23  3:19 PM   Result Value Ref Range    RBC, UA <5 <=5 /HPF    WBC, UA 6 (H) <=5 /HPF    Squamous Epithelial Cells, UA <5 <=5 /HPF    Bacteria, UA None Seen None Seen, Rare, Occasional /HPF   Troponin I    Collection Time: 08/23/23  7:36 PM   Result Value Ref Range    Troponin-I <0.010 0.000 - 0.045 ng/mL     Significant Imaging:  Imaging Results              X-Ray Chest 1 View (Final result)  Result time 08/23/23 14:42:10      Final result by Augustin Blackmon MD (08/23/23 14:42:10)                   Impression:      NO ACUTE CARDIOPULMONARY PROCESS IDENTIFIED.      Electronically signed by: Augustin Blackmon  Date:    08/23/2023  Time:    14:42               Narrative:    EXAMINATION:  XR CHEST 1 VIEW    CLINICAL HISTORY:  Weakness    TECHNIQUE:  One view    COMPARISON:  January 15, 2021.    FINDINGS:  Cardiopericardial silhouette appearance is similar.  Cardiac device electrodes terminate within the right atrium and the  right ventricle.  Left lower lung zone linear atelectasis or scarring.  No acute dense focal or segmental consolidation, congestive process, pleural effusions or pneumothorax.                                    EKG:    Results for orders placed or performed during the hospital encounter of 08/23/23   EKG 12-lead    Narrative    Test Reason : R53.1,    Vent. Rate : 069 BPM     Atrial Rate : 416 BPM     P-R Int : 000 ms          QRS Dur : 162 ms      QT Int : 440 ms       P-R-T Axes : 000 -57 119 degrees     QTc Int : 471 ms    Ventricular-paced rhythm  Abnormal ECG  When compared with ECG of 30-JUL-2012 15:38,  Vent. rate has decreased BY   4 BPM  Confirmed by Otilio Ratliff MD ([id number]) on 8/23/2023 7:26:41 PM    Referred By: MIGUEL ANGEL   SELF           Confirmed By:Otilio Ratliff MD     Physical Exam  Vitals and nursing note reviewed.   Constitutional:       General: She is not in acute distress.     Appearance: She is not toxic-appearing.   HENT:      Head: Normocephalic.   Cardiovascular:      Rate and Rhythm: Normal rate and regular rhythm.      Pulses: Normal pulses.      Heart sounds: No murmur heard.  Pulmonary:      Effort: Pulmonary effort is normal. No respiratory distress.      Breath sounds: Normal breath sounds. No wheezing.   Abdominal:      General: Bowel sounds are normal.      Palpations: Abdomen is soft.      Tenderness: There is no abdominal tenderness. There is no guarding.   Musculoskeletal:      Right lower leg: No edema.      Left lower leg: No edema.   Skin:     Capillary Refill: Capillary refill takes less than 2 seconds.      Comments: Small faint bruise on R LE lateral malleolus          Home Medications:   No current facility-administered medications on file prior to encounter.     Current Outpatient Medications on File Prior to Encounter   Medication Sig Dispense Refill    ACCU-CHEK SMARTVIEW TEST STRIP Strp       B-complex with vitamin C (Z-BEC OR EQUIV) tablet Take 1 tablet by mouth once  "daily.      budesonide (ENTOCORT EC) 3 mg capsule Take 9 mg by mouth once daily.      carvediloL (COREG) 12.5 MG tablet Take 12.5 mg by mouth 2 (two) times a day.      DROPLET INSULIN SYRINGE 0.3 mL 31 gauge x 5/16" Syrg       ELIQUIS 5 mg Tab       fenofibrate 160 MG Tab Take 160 mg by mouth once daily.      ferrous sulfate (FEOSOL) Tab tablet Take 1 tablet by mouth once daily.      furosemide (LASIX) 40 MG tablet       glipiZIDE (GLUCOTROL) 10 MG tablet       mesalamine (PENTASA) 500 MG CR capsule Take 500 mg by mouth 2 (two) times a day.      NOVOLOG MIX 70-30 U-100 INSULN 100 unit/mL (70-30) Soln Inject into the skin.      omega-3 fatty acids-fish oil 684-1,200 mg CpDR Take 1 capsule by mouth once daily.      ondansetron (ZOFRAN) 4 MG tablet Take 1 tablet (4 mg total) by mouth every 6 (six) hours. 12 tablet 0    quinapriL (ACCUPRIL) 20 MG tablet       sertraline (ZOLOFT) 100 MG tablet       spironolactone (ALDACTONE) 25 MG tablet Take 25 mg by mouth once daily.         Current Inpatient Medications:    Current Facility-Administered Medications:     dextrose 10% bolus 125 mL 125 mL, 12.5 g, Intravenous, PRNZach Hector A, MD    dextrose 10% bolus 250 mL 250 mL, 25 g, Intravenous, PRNZach Hector A, MD    glucagon (human recombinant) injection 1 mg, 1 mg, Intramuscular, PRNZach Hector A, MD    glucose chewable tablet 16 g, 16 g, Oral, PRNZach Hector A, MD    glucose chewable tablet 24 g, 24 g, Oral, PRNZach Hector A, MD    melatonin tablet 6 mg, 6 mg, Oral, Nightly PRNJorden Martin R, MD    sodium chloride 0.9% flush 10 mL, 10 mL, Intravenous, PRNJorden Martin R, MD         VTE Risk Mitigation (From admission, onward)           Ordered     IP VTE HIGH RISK PATIENT  Once         08/23/23 1926     Place sequential compression device  Until discontinued         08/23/23 1926                    Assessment:   Noncardiac related chest pain   Elevated Trop likely 2/2 demand  CAD s/p " stents  Pacemaker  HTN  HLD  DM  Crohn's  anemia     Plan:   Mildly elevated trop now wnl, no acute changes seen on EKG.   Recommend outpatient stress test for further work up.   Pt has apt with Dr Valentino on 8/28. Encouraged pt to keep apt.   Will sign off at this time. Please re-consult us if there are any changes.   Thank you for your consult.     Jose Barfield MD  Cardiology  [facility]  08/24/2023 11:44 AM    I have seen the patient, reviewed the resident's note, assessment and plan. I have personally interviewed and examined the patient at bedside and agree with the findings. Medical decision making listed above were done under my guidance.    Physical exam:  Cardiovascular system: regular rhythm, no murmur.  Lungs: CTAB.  Extremities: No leg edema.    Plan:  No cardiac symptoms at this time patient's chest discomfort was noncardiac patient will get follow-up with her primary cardiologist

## 2023-08-24 NOTE — PLAN OF CARE
Patient would like to have 24 hour sitters at her home when discharged. She will have her family call her insurance to check with them if they pay for sitters. Patient told CM if my insurance does not pay, I might have to go to the nursing home. I don't want top go to the nursing home. Printed Choice list in Care Port and given to patient and family to review, if need to choose this route.

## 2023-08-24 NOTE — PROGRESS NOTES
PATIENT NAME:       UZAIR HILARIO  YOB: 1935  CSN:                [id number]   MRN:                [id number]  ADMIT DATE:         08/23/2023 13:56:00  PHYSICIAN:          Joao Serrano MD                            PROGRESS NOTE    DATE:  08/24/2023 00:00:00    SUBJECTIVE:  This is an 88-year-old white female.  She did not have any problems   last night.  Her family having brought her medications yet.  She has not had   any shortness of breath, chest pain, palpitations, or headaches.  She still with   some confusion.  She is oriented x1 and barely 2.  There are lot of concerns   about the family.  She does not have anybody all the time and she is by herself.    She does have some body a few hours a day only and she has been weak.    REVIEW OF SYSTEMS:  Review of X12 as above.    OBJECTIVE:  VITAL SIGNS:  Blood pressure earlier today is 124/63, pulse is 69,   and temperature 98.  GENERAL APPEARANCE:  She is a little confused, in no acute distress.  HEART:  Regular rhythm and rate.  LUNGS:  Clear.  ABDOMEN:  Soft, nontender.  EXTREMITIES:  No clubbing, cyanosis, or edema.  NEUROLOGIC:  Nonfocal.    LABORATORY DATA:  Troponin is less than 0.01.    IMPRESSION:    1. Multiple falls.  2. Questionable syncopal episode.  3. Questionable chest pain.  4. She has some significant cognitive issues.  5. Atherosclerotic heart disease.  6. CHF.  7. Hypertension.  8. Dyslipidemia.  9. Diabetes mellitus type 2.  10. History of anemia and iron deficiency.  11. Crohn's disease.  12. Sick sinus syndrome with a pacer.    PLAN:  We will get  and .  She may need 24/7 help.    We will resume the medicines.  We will check sugars.  We will continue with DVT   prophylaxis.  She is on Eliquis at home.  We will continue with other current   medications.        ______________________________  Joao Serrano MD    NOELLE/AQS  DD:  08/24/2023  Time:  01:53PM  DT:  08/24/2023  Time:   02:54PM  Job #:  [id number]      PROGRESS NOTE

## 2023-08-25 LAB
POCT GLUCOSE: 147 MG/DL (ref 70–110)
POCT GLUCOSE: 174 MG/DL (ref 70–110)
POCT GLUCOSE: 176 MG/DL (ref 70–110)
POCT GLUCOSE: 242 MG/DL (ref 70–110)

## 2023-08-25 PROCEDURE — 25000003 PHARM REV CODE 250: Performed by: STUDENT IN AN ORGANIZED HEALTH CARE EDUCATION/TRAINING PROGRAM

## 2023-08-25 PROCEDURE — 21400001 HC TELEMETRY ROOM

## 2023-08-25 PROCEDURE — 97162 PT EVAL MOD COMPLEX 30 MIN: CPT

## 2023-08-25 PROCEDURE — 25000003 PHARM REV CODE 250: Performed by: INTERNAL MEDICINE

## 2023-08-25 PROCEDURE — 97166 OT EVAL MOD COMPLEX 45 MIN: CPT

## 2023-08-25 RX ADMIN — SPIRONOLACTONE 25 MG: 25 TABLET ORAL at 08:08

## 2023-08-25 RX ADMIN — OMEGA-3 FATTY ACIDS CAP 1000 MG 1 CAPSULE: 1000 CAP at 08:08

## 2023-08-25 RX ADMIN — APIXABAN 5 MG: 5 TABLET, FILM COATED ORAL at 09:08

## 2023-08-25 RX ADMIN — ONDANSETRON 4 MG: 4 TABLET, ORALLY DISINTEGRATING ORAL at 10:08

## 2023-08-25 RX ADMIN — CARVEDILOL 12.5 MG: 12.5 TABLET, FILM COATED ORAL at 08:08

## 2023-08-25 RX ADMIN — FERROUS SULFATE TAB 325 MG (65 MG ELEMENTAL FE) 1 EACH: 325 (65 FE) TAB at 08:08

## 2023-08-25 RX ADMIN — GLIPIZIDE 5 MG: 5 TABLET ORAL at 08:08

## 2023-08-25 RX ADMIN — MESALAMINE 400 MG: 400 CAPSULE, DELAYED RELEASE ORAL at 09:08

## 2023-08-25 RX ADMIN — APIXABAN 5 MG: 5 TABLET, FILM COATED ORAL at 08:08

## 2023-08-25 RX ADMIN — FENOFIBRATE 145 MG: 145 TABLET, FILM COATED ORAL at 08:08

## 2023-08-25 RX ADMIN — MESALAMINE 400 MG: 400 CAPSULE, DELAYED RELEASE ORAL at 08:08

## 2023-08-25 RX ADMIN — Medication 6 MG: at 09:08

## 2023-08-25 RX ADMIN — ONDANSETRON 4 MG: 4 TABLET, ORALLY DISINTEGRATING ORAL at 09:08

## 2023-08-25 RX ADMIN — FUROSEMIDE 40 MG: 40 TABLET ORAL at 08:08

## 2023-08-25 RX ADMIN — CARVEDILOL 12.5 MG: 12.5 TABLET, FILM COATED ORAL at 09:08

## 2023-08-25 RX ADMIN — BUDESONIDE 9 MG: 3 CAPSULE, COATED PELLETS ORAL at 08:08

## 2023-08-25 RX ADMIN — OMEGA-3 FATTY ACIDS CAP 1000 MG 1 CAPSULE: 1000 CAP at 09:08

## 2023-08-25 RX ADMIN — LISINOPRIL 10 MG: 10 TABLET ORAL at 08:08

## 2023-08-25 RX ADMIN — SERTRALINE HYDROCHLORIDE 100 MG: 50 TABLET ORAL at 08:08

## 2023-08-25 NOTE — PLAN OF CARE
Patient choice list with nursing home options was given to patient and family on yesterday. CM left message for daughter Lori seeing if a decision has been made. Awaiting call back .

## 2023-08-25 NOTE — PROGRESS NOTES
[facility]                       [address] [address]    PATIENT NAME:       UZAIR HILARIO  YOB: 1935  CSN:                [id number]   MRN:                [id number]  ADMIT DATE:         08/23/2023 13:56:00  PHYSICIAN:          Joao Serrano MD                            PROGRESS NOTE    DATE:      SUBJECTIVE:  This is an 88-year-old white female.  She was initially admitted   after having significant weakness.  She apparently had a concussion and a   syncope recently and she was evaluated just a few days ago.  She had a slight   increase in troponin, but she was cleared by Cardiology.  She has some confusion   and forgetfulness as per the family, and she has had multiple falls recently.    She lives most of the time by herself.  No chest pain.  No shortness of breath.    No palpitations or other problem.    REVIEW OF SYSTEMS:  X12 as above.    OBJECTIVE:  VITAL SIGNS:  Blood pressure this morning is 108/58, pulse is 69,   and temperature 97.7.  GENERAL APPEARANCE:  She is alert, in no acute distress.  She is oriented x2.    She does not know the date, the year, president, or anything else other than   where she is and who she is.    HEART:  Regular rhythm and rate.  LUNGS:  Clear.  ABDOMEN:  Soft and nontender.    EXTREMITIES:  No clubbing, cyanosis, or edema.  NEUROLOGIC:  Nonfocal.    LABORATORY DATA:  There are no new labs.  Sugars in the low 200s.    IMPRESSION:  She had a concussion, significant cognitive deficits, most likely   consistent with moderate dementia.  She has a syncopal episode recently,   atherosclerotic heart disease, congestive heart failure, hypertension,   dyslipidemia, history of anemia, Crohn's, sick sinus syndrome with a pacer, and   diabetes type 2.    PLAN:  We will continue PT, OT, and ST.  We will continue her medications for   the time being from home.  We will  continue to monitor sugars.  Social Service   and  are on the case for possible nursing home placement.        ______________________________  MD NOELLE Hugo/SUSIE  DD:  08/25/2023  Time:  08:26AM  DT:  08/25/2023  Time:  09:54AM  Job #:  [id number]      PROGRESS NOTE

## 2023-08-25 NOTE — PLAN OF CARE
Problem: Occupational Therapy  Goal: Occupational Therapy Goal  Description: Goals to be met by: 9/22/23     Patient will increase functional independence with ADLs by performing:    UE Dressing with [place].  LE Dressing with Modified [place].  Grooming while standing with Modified [place].  Toileting from toilet with Modified [place] for hygiene and clothing management.     Outcome: Ongoing, Progressing

## 2023-08-25 NOTE — PLAN OF CARE
08/25/23 1402   Discharge Reassessment   Assessment Type Discharge Planning Reassessment   Did the patient's condition or plan change since previous assessment? Yes   Discharge Plan discussed with: Patient;Adult children   Communicated RAIN with patient/caregiver Date not available/Unable to determine   Discharge Plan A Skilled Nursing Facility   Discharge Plan B New Nursing Home placement - [facility] care facility   DME Needed Upon Discharge  none   Transition of Care Barriers None   Why the patient remains in the hospital Requires continued medical care   Post-Acute Status   Post-Acute Authorization Placement   Post-Acute Placement Status Referrals Sent   Discharge Delays None known at this time     Patient choice list was provided to patient and family yesterday. Daughter Lori returned call saying she would like patient placed at the [facility] in [place] for therapy and they will re-evaluate after therapy if permanent placement is needed. Freedom of choice obtained and placed in chart. CM spoke to patient she is in agreement with SNF services, not wishing for permanent stay at this time. SSC sent referral to the [facility] via careport.

## 2023-08-25 NOTE — PT/OT/SLP EVAL
Physical Therapy Evaluation    Patient Name:  Denny Mai   MRN:  [id number]    Recommendations:     Discharge Recommendations:  (home with increased care vs swing/TCU)   Discharge Equipment Recommendations: none   Barriers to discharge: Decreased caregiver support and Impaired mobility    Assessment:     Denny Mai is a 88 y.o. female admitted with a medical diagnosis of Elevated troponin. Pt has been experiencing increased weakness along with some nausea and vomitting in recent week. Pt also with several falls in recent weeks.  She presents with the following impairments/functional limitations: impaired endurance, impaired sensation, impaired functional mobility, gait instability, impaired balance, impaired cardiopulmonary response to activity. Pt lives home alone and has caregiver that comes a few times per week to assist with household chores and drives pt to appointments as needed. Pt has been Mod Ind with mobility using rollator in the home and RW outside of the home. At this time pt requires SBA/CGA for mobility with RW but is easily fatigued with exertion. Noted decline in gait stability with onset of fatigue. Pt safe to discharge home if full time assistance can be provided but would otherwise benefit from ongoing skilled services to improve overall strength and cardiovascular endurance.    Rehab Prognosis: Good; patient would benefit from acute skilled PT services to address these deficits and reach maximum level of function.    Recent Surgery: * No surgery found *      Plan:     During this hospitalization, patient to be seen 5 x/week to address the identified rehab impairments via gait training, therapeutic activities, therapeutic exercises, neuromuscular re-education and progress toward the following goals:    Plan of Care Expires:  09/25/23    Subjective     Chief Complaint: fatigue  Patient/Family Comments/goals: increase strength before returning home.  Pain/Comfort:  Pain Rating 1:  0/10  Pain Rating Post-Intervention 1: 0/10    Patients cultural, spiritual, Restorationism conflicts given the current situation: no    Living Environment:  Pt lives home alone in single level home with 2 steps to enter, hand rail on L side.   Prior to admission, patients level of function was Mod Ind for mobility, assist for household activities.  Equipment used at home: bedside commode, walker, rolling, rollator.  DME owned (not currently used): none.  Upon discharge, patient will have assistance from TBD.    Objective:     Communicated with nurse prior to session.  Patient found sitting edge of bed with telemetry  upon PT entry to room.    General Precautions: Standard, fall  Orthopedic Precautions:N/A   Braces: N/A  Respiratory Status: Room air  Heart Rate: resting 80's, with ambulation 120bpm      Exams:  Cognitive Exam:  Patient is oriented to Person, Place, Time, and Situation  Gross Motor Coordination:  WFL  RLE ROM: WFL  RLE Strength: WFL  LLE ROM: WFL  LLE Strength: WFL  Skin integrity: Visible skin intact  Sensation: Impaired L LE to light touch, also observed impaired proprioception during mobility      Functional Mobility:  Transfers:     Sit to Stand:  contact guard assistance with rolling walker and verbal cues to correct hand placement  Gait: 100ft with RW, CGA. Inconsistent L foot placement observed with NBOS and near scissoring at times. Verbal cues to correct posture and maintain COG within [place].      AM-PAC 6 CLICK MOBILITY  Total Score:18         Patient provided with verbal education regarding PT POC.  Understanding was verbalized, however additional teaching warranted.     Patient left  sitting on sofa  with all lines intact, call button in reach, and family at bedside .    GOALS:   Multidisciplinary Problems       Physical Therapy Goals          Problem: Physical Therapy    Goal Priority Disciplines Outcome Goal Variances Interventions   Physical Therapy Goal     PT, PT/OT Ongoing, Progressing      Description: Goals to be met by: 2023     Patient will increase functional independence with mobility by performin. Sit to stand transfer with Modified [place]  2. Bed to chair transfer with Modified [place] using Rolling Walker  3. Gait  x 300 feet with Modified [place] using Rolling Walker.   4. Ascend/descend 2 steps with left Handrails Modified [place] using No Assistive Device.                          History:     Past Medical History:   Diagnosis Date    Anemia, unspecified     CAD (coronary artery disease)     Crohn disease     DM (diabetes mellitus)     History of coronary angioplasty with insertion of stent     HLD (hyperlipidemia)     HTN (hypertension)     Memory loss        No past surgical history on file.    Time Tracking:     PT Received On: 23  PT Start Time: 1106     PT Stop Time: 1130  PT Total Time (min): 24 min     Billable Minutes: Evaluation mod      2023

## 2023-08-25 NOTE — PT/OT/SLP EVAL
Occupational Therapy  Evaluation    Name: Denny Mai  MRN: [id number]  Admitting Diagnosis: Elevated troponin  Recent Surgery: * No surgery found *      Recommendations:     Discharge Recommendations:  (home with increased care; swing bed/TCU)  Discharge Equipment Recommendations:  none  Barriers to discharge:  None    Assessment:     Denny Mai is a 88 y.o. female with a medical diagnosis of generalized weakness after recent trip and fall and elevated troponin. PMH for anemia, CAD, DM, HTN, and memory loss. She presents with overall weakness and deconditioning that is limiting her ability to perform ADL's as she was prior to fall (Mod I). Pt lives alone but has family nearby and a good support system if she requires assistance, such as driving her to MD appointments, etc. She also reports that she has assistance that comes for a few hours a day to complete IADLs such as cleaning, vacuuming, doing the dishes, etc. Performance deficits affecting function: weakness, impaired endurance, impaired functional mobility, decreased safety awareness, impaired self care skills.      Rehab Prognosis: Good; patient would benefit from acute skilled OT services to address these deficits and reach maximum level of function.       Plan:     Patient to be seen 3 x/week to address the above listed problems via self-care/home management, therapeutic activities, therapeutic exercises  Plan of Care Expires: 09/22/23  Plan of Care Reviewed with: patient, family    Subjective     Chief Complaint: none stated  Patient/Family Comments/goals: to return to PLOF    Occupational Profile:  Living Environment: lives alone in 1 story home, 3 steps to enter with rails on L side, tub/shower combo inside the house that she cannot get into, but has walk in shower in outdoor kitchen that she uses to bath, per pt report   Previous level of function: Mod I- Min A  Roles and Routines: mother, grandmother  Equipment Used at Home: rollator,  walker, rolling  Assistance upon Discharge: family    Pain/Comfort:  Pain Rating 1: 0/10    Patients cultural, spiritual, Christianity conflicts given the current situation:      Objective:     Communicated with: Patient found sitting edge of bed with peripheral IV, telemetry upon OT entry to room.    General Precautions: Standard, fall  Vital Signs: HR: 99 bpm at start of ambulation, increased to 108 bpm, and then 120 bpm at end of with some SOB noted     Occupational Performance:      Functional Mobility/Transfers:  Patient completed Sit <> Stand Transfer with contact guard assistance  with  rolling walker   Functional Mobility: Ambulated 100 ft with RW and CGA    Activities of Daily Living:  Lower Body Dressing: modified independence to don/doff socks    Cognitive/Visual Perceptual:  intact    Physical Exam:  BUE: WFL    Therapeutic Positioning  Risk for acquired pressure injuries is increased due to altered skin already present.      OT recommendations for therapeutic positioning throughout hospitalization:   Follow [facility] Pressure Injury Prevention Protocol      Patient Education:  Patient and granddaughter  provided with verbal education regarding OT role/goals/POC and safety awareness.  Understanding was verbalized.     Patient left up in couch with all lines intact and call button in reach    GOALS:   Multidisciplinary Problems       Occupational Therapy Goals          Problem: Occupational Therapy    Goal Priority Disciplines Outcome Interventions   Occupational Therapy Goal     OT, PT/OT Ongoing, Progressing    Description: Goals to be met by: 9/22/23     Patient will increase functional independence with ADLs by performing:    UE Dressing with [place].  LE Dressing with Modified [place].  Grooming while standing with Modified [place].  Toileting from toilet with Modified [place] for hygiene and clothing management.                          History:     Past Medical History:   Diagnosis  Date    Anemia, unspecified     CAD (coronary artery disease)     Crohn disease     DM (diabetes mellitus)     History of coronary angioplasty with insertion of stent     HLD (hyperlipidemia)     HTN (hypertension)     Memory loss        No past surgical history on file.    Time Tracking:     OT Date of Treatment: 08/25/23  OT Start Time: 1106  OT Stop Time: 1131  OT Total Time (min): 25 min    Billable Minutes:Evaluation Mod complexity    8/25/2023

## 2023-08-26 LAB
POCT GLUCOSE: 145 MG/DL (ref 70–110)
POCT GLUCOSE: 182 MG/DL (ref 70–110)
POCT GLUCOSE: 254 MG/DL (ref 70–110)

## 2023-08-26 PROCEDURE — 25000003 PHARM REV CODE 250: Performed by: INTERNAL MEDICINE

## 2023-08-26 PROCEDURE — 25000003 PHARM REV CODE 250: Performed by: STUDENT IN AN ORGANIZED HEALTH CARE EDUCATION/TRAINING PROGRAM

## 2023-08-26 PROCEDURE — 21400001 HC TELEMETRY ROOM

## 2023-08-26 RX ORDER — INSULIN ASPART 100 [IU]/ML
25 INJECTION, SUSPENSION SUBCUTANEOUS
Status: DISCONTINUED | OUTPATIENT
Start: 2023-08-27 | End: 2023-08-26

## 2023-08-26 RX ORDER — INSULIN ASPART 100 [IU]/ML
10 INJECTION, SUSPENSION SUBCUTANEOUS
Status: DISCONTINUED | OUTPATIENT
Start: 2023-08-27 | End: 2023-08-28

## 2023-08-26 RX ORDER — MAG HYDROX/ALUMINUM HYD/SIMETH 200-200-20
30 SUSPENSION, ORAL (FINAL DOSE FORM) ORAL EVERY 6 HOURS PRN
Status: DISCONTINUED | OUTPATIENT
Start: 2023-08-26 | End: 2023-08-31 | Stop reason: HOSPADM

## 2023-08-26 RX ADMIN — FUROSEMIDE 40 MG: 40 TABLET ORAL at 09:08

## 2023-08-26 RX ADMIN — APIXABAN 5 MG: 5 TABLET, FILM COATED ORAL at 08:08

## 2023-08-26 RX ADMIN — ALUMINUM HYDROXIDE, MAGNESIUM HYDROXIDE, AND SIMETHICONE 30 ML: 200; 200; 20 SUSPENSION ORAL at 11:08

## 2023-08-26 RX ADMIN — FENOFIBRATE 145 MG: 145 TABLET, FILM COATED ORAL at 09:08

## 2023-08-26 RX ADMIN — SERTRALINE HYDROCHLORIDE 100 MG: 50 TABLET ORAL at 09:08

## 2023-08-26 RX ADMIN — Medication 6 MG: at 08:08

## 2023-08-26 RX ADMIN — SPIRONOLACTONE 25 MG: 25 TABLET ORAL at 09:08

## 2023-08-26 RX ADMIN — FERROUS SULFATE TAB 325 MG (65 MG ELEMENTAL FE) 1 EACH: 325 (65 FE) TAB at 09:08

## 2023-08-26 RX ADMIN — ONDANSETRON 4 MG: 4 TABLET, ORALLY DISINTEGRATING ORAL at 10:08

## 2023-08-26 RX ADMIN — MESALAMINE 400 MG: 400 CAPSULE, DELAYED RELEASE ORAL at 09:08

## 2023-08-26 RX ADMIN — BUDESONIDE 9 MG: 3 CAPSULE, COATED PELLETS ORAL at 09:08

## 2023-08-26 RX ADMIN — CARVEDILOL 12.5 MG: 12.5 TABLET, FILM COATED ORAL at 08:08

## 2023-08-26 RX ADMIN — ALUMINUM HYDROXIDE, MAGNESIUM HYDROXIDE, AND SIMETHICONE 30 ML: 200; 200; 20 SUSPENSION ORAL at 05:08

## 2023-08-26 RX ADMIN — GLIPIZIDE 5 MG: 5 TABLET ORAL at 09:08

## 2023-08-26 RX ADMIN — CARVEDILOL 12.5 MG: 12.5 TABLET, FILM COATED ORAL at 09:08

## 2023-08-26 RX ADMIN — LISINOPRIL 10 MG: 10 TABLET ORAL at 09:08

## 2023-08-26 RX ADMIN — OMEGA-3 FATTY ACIDS CAP 1000 MG 1 CAPSULE: 1000 CAP at 08:08

## 2023-08-26 RX ADMIN — APIXABAN 5 MG: 5 TABLET, FILM COATED ORAL at 09:08

## 2023-08-26 RX ADMIN — MESALAMINE 400 MG: 400 CAPSULE, DELAYED RELEASE ORAL at 08:08

## 2023-08-26 RX ADMIN — OMEGA-3 FATTY ACIDS CAP 1000 MG 1 CAPSULE: 1000 CAP at 09:08

## 2023-08-27 LAB
POCT GLUCOSE: 201 MG/DL (ref 70–110)
POCT GLUCOSE: 207 MG/DL (ref 70–110)
POCT GLUCOSE: 210 MG/DL (ref 70–110)
POCT GLUCOSE: 247 MG/DL (ref 70–110)

## 2023-08-27 PROCEDURE — 11000001 HC ACUTE MED/SURG PRIVATE ROOM

## 2023-08-27 PROCEDURE — 25000003 PHARM REV CODE 250: Performed by: INTERNAL MEDICINE

## 2023-08-27 PROCEDURE — 63600175 PHARM REV CODE 636 W HCPCS: Performed by: INTERNAL MEDICINE

## 2023-08-27 PROCEDURE — 97530 THERAPEUTIC ACTIVITIES: CPT | Mod: CQ

## 2023-08-27 PROCEDURE — 97116 GAIT TRAINING THERAPY: CPT | Mod: CQ

## 2023-08-27 RX ORDER — MEMANTINE HYDROCHLORIDE 5 MG/1
5 TABLET ORAL 2 TIMES DAILY
Status: DISCONTINUED | OUTPATIENT
Start: 2023-08-27 | End: 2023-08-31 | Stop reason: HOSPADM

## 2023-08-27 RX ORDER — DONEPEZIL HYDROCHLORIDE 5 MG/1
5 TABLET, FILM COATED ORAL NIGHTLY
Status: DISCONTINUED | OUTPATIENT
Start: 2023-08-27 | End: 2023-08-31 | Stop reason: HOSPADM

## 2023-08-27 RX ADMIN — INSULIN ASPART 2 UNITS: 100 INJECTION, SOLUTION INTRAVENOUS; SUBCUTANEOUS at 05:08

## 2023-08-27 RX ADMIN — MEMANTINE 5 MG: 5 TABLET ORAL at 09:08

## 2023-08-27 RX ADMIN — CARVEDILOL 12.5 MG: 12.5 TABLET, FILM COATED ORAL at 07:08

## 2023-08-27 RX ADMIN — CARVEDILOL 12.5 MG: 12.5 TABLET, FILM COATED ORAL at 09:08

## 2023-08-27 RX ADMIN — INSULIN ASPART 2 UNITS: 100 INJECTION, SOLUTION INTRAVENOUS; SUBCUTANEOUS at 11:08

## 2023-08-27 RX ADMIN — FUROSEMIDE 40 MG: 40 TABLET ORAL at 09:08

## 2023-08-27 RX ADMIN — APIXABAN 5 MG: 5 TABLET, FILM COATED ORAL at 07:08

## 2023-08-27 RX ADMIN — FERROUS SULFATE TAB 325 MG (65 MG ELEMENTAL FE) 1 EACH: 325 (65 FE) TAB at 09:08

## 2023-08-27 RX ADMIN — MESALAMINE 400 MG: 400 CAPSULE, DELAYED RELEASE ORAL at 11:08

## 2023-08-27 RX ADMIN — DONEPEZIL HYDROCHLORIDE 5 MG: 5 TABLET, FILM COATED ORAL at 09:08

## 2023-08-27 RX ADMIN — SERTRALINE HYDROCHLORIDE 100 MG: 50 TABLET ORAL at 11:08

## 2023-08-27 RX ADMIN — SPIRONOLACTONE 25 MG: 25 TABLET ORAL at 09:08

## 2023-08-27 RX ADMIN — OMEGA-3 FATTY ACIDS CAP 1000 MG 1 CAPSULE: 1000 CAP at 11:08

## 2023-08-27 RX ADMIN — FENOFIBRATE 145 MG: 145 TABLET, FILM COATED ORAL at 09:08

## 2023-08-27 RX ADMIN — APIXABAN 5 MG: 5 TABLET, FILM COATED ORAL at 09:08

## 2023-08-27 RX ADMIN — OMEGA-3 FATTY ACIDS CAP 1000 MG 1 CAPSULE: 1000 CAP at 09:08

## 2023-08-27 RX ADMIN — LISINOPRIL 10 MG: 10 TABLET ORAL at 09:08

## 2023-08-27 RX ADMIN — BUDESONIDE 9 MG: 3 CAPSULE, COATED PELLETS ORAL at 11:08

## 2023-08-27 RX ADMIN — MESALAMINE 400 MG: 400 CAPSULE, DELAYED RELEASE ORAL at 09:08

## 2023-08-27 NOTE — NURSING
Nurses Note -- 4 Eyes      8/27/2023   4:44 AM      Skin assessed during: Transfer      [] No Altered Skin Integrity Present    []Prevention Measures Documented      [x] Yes- Altered Skin Integrity Present or Discovered   [] LDA Added if Not in Epic (Describe Wound)   [] New Altered Skin Integrity was Present on Admit and Documented in LDA   [] Wound Image Taken    Wound Care Consulted? Yes    Attending Nurse:  Carlos Hurtado RN/Staff Member:  Lalito Lundberg RN

## 2023-08-27 NOTE — PT/OT/SLP PROGRESS
Physical Therapy Treatment    Patient Name:  Denny Mai   MRN:  [id number]    Recommendations:     Discharge Recommendations:  (home with increased care vs swing/TCU)  Discharge Equipment Recommendations: none  Barriers to discharge: None    Assessment:     Denny Mai is a 88 y.o. female admitted with a medical diagnosis of Elevated troponin.  She presents with the following impairments/functional limitations: impaired endurance, impaired sensation, impaired functional mobility, gait instability, impaired balance, impaired cardiopulmonary response to activity.    Rehab Prognosis: Good; patient would benefit from acute skilled PT services to address these deficits and reach maximum level of function.    Recent Surgery: * No surgery found *      Plan:     During this hospitalization, patient to be seen 5 x/week to address the identified rehab impairments via gait training, therapeutic activities, therapeutic exercises, neuromuscular re-education and progress toward the following goals:    Plan of Care Expires:  09/25/23    Subjective     Chief Complaint: None  Pain/Comfort:  0/10      Objective:     Communicated with NSG prior to session.  Patient found up in chair upon PT entry to room.     General Precautions: Standard, fall  Orthopedic Precautions: N/A  Braces: N/A  Respiratory Status: Room air  Skin Integrity: Visible skin intact      Functional Mobility:  Transfers:     Sit to Stand:  minimum assistance with hand-held assist  Gait: 100' x 2 with standing rest break and RW       Patient left up in chair with all lines intact and call button in reach.    GOALS:   Multidisciplinary Problems       Physical Therapy Goals          Problem: Physical Therapy    Goal Priority Disciplines Outcome Goal Variances Interventions   Physical Therapy Goal     PT, PT/OT Ongoing, Progressing     Description: Goals to be met by: 09/25/2023     Patient will increase functional independence with mobility by  performin. Sit to stand transfer with Modified [place]  2. Bed to chair transfer with Modified [place] using Rolling Walker  3. Gait  x 300 feet with Modified [place] using Rolling Walker.   4. Ascend/descend 2 steps with left Handrails Modified [place] using No Assistive Device.                          Time Tracking:     Billable Minutes: Gait Training 15 and Therapeutic Activity 8    Treatment Type: Treatment  PT/PTA: PTA     Number of PTA visits since last PT visit: 1     2023

## 2023-08-27 NOTE — PROGRESS NOTES
[facility]                       [address] [address]    PATIENT NAME:       UZAIR HILARIO  YOB: 1935  CSN:                [id number]   MRN:                [id number]  ADMIT DATE:         08/23/2023 13:56:00  PHYSICIAN:          Joao Serrano MD                            PROGRESS NOTE    DATE:      SUBJECTIVE:  This is an 88-year-old white female.  She is doing fairly well at   the present time.  She is still with some confusion, but she remembers my name.    She denies any shortness of breath, chest pain, palpitations, or other   problems.  She has been afebrile.  Her blood sugars and blood pressure have been   fairly well controlled.  No significant issues.    REVIEW OF SYSTEMS:  X12 as above.    OBJECTIVE:  VITAL SIGNS:  Blood pressure was 127/68, pulse 71, and temp 97.8.  GENERAL APPEARANCE:  She is alert, in no acute distress.  HEART:  Regular rhythm and rate.  LUNGS:  Clear.  ABDOMEN:  Soft, nontender.  EXTREMITIES:  No clubbing, cyanosis, or edema.  NEUROLOGIC:  Nonfocal.    LABORATORY DATA:  There are no new labs.    IMPRESSION:  She had multiple falls and she had a concussion.  She has   significant cognitive deficits, most likely consistent with moderate dementia.    Question of syncopal episode, atherosclerotic heart disease, congestive heart   failure, hypertension, dyslipidemia, history of anemia, chronic sick sinus   syndrome with a pacer, and diabetes type 2.    PLAN:  We will continue PT, OT, ST. We will continue with the current   medications.  She has not had her insulin since it is not available and I was   not notified about that.  So, we will restart her insulin as soon as the family   brings it at 10 units in the morning before breakfast and 8 units in the   afternoon before supper.  We will continue to monitor sugars.  We will continue   to mobilize her as  tolerated.        ______________________________  MD NOELLE Hugo/SUSIE  DD:  08/26/2023  Time:  06:10PM  DT:  08/26/2023  Time:  08:25PM  Job #:  [id number]      PROGRESS NOTE

## 2023-08-27 NOTE — PROGRESS NOTES
[facility]                       [address] [address]    PATIENT NAME:       UZAIR HILARIO  YOB: 1935  CSN:                [id number]   MRN:                [id number]  ADMIT DATE:         08/23/2023 13:56:00  PHYSICIAN:          Joao Serrano MD                            PROGRESS NOTE    DATE:      SUBJECTIVE:  This is an 88-year-old white female.  As per her daughter, she had   been with a continued decline that had been significant over the last year.  She   has not been diagnosed with dementia, but she is very forgetful, especially   short-term.  She has been moving around, but she has been unsteady and she had   several falls.  She had a questionable syncope-concussion where after the fall,   she had some nausea and vomiting.  She was admitted after feeling bad.  She has   been doing fairly good since in the hospital.  She is waiting for evaluation of   SNF nursing home at [facility].  This probably is going to happen tomorrow and she is   going to be transferred as soon as accepted.  She has been doing fairly well at   the hospital.  She is moving around.  Family is visiting.  Her sat has been in   the mid 90s for most part at room air.  Blood pressure has been normal.  Blood   sugars have been a little bit on the high side since she has not had her   insulin.  It is not available in the hospital and family just brought it today.    I discussed with her power-of-, her daughter, regarding her memory   issues and the need for 24/7 care due to her forgetfulness and her multiple   falls and the high risk of confusion issues.  She has been afebrile.    REVIEW OF SYSTEMS:  X12 as above.    OBJECTIVE:  VITAL SIGNS:  Blood pressure is 125/75, pulse 75, temp 97.7.  GENERAL APPEARANCE:  She is alert, in no acute distress.  HEART:  Regular rhythm and rate.  LUNGS:  Clear.  ABDOMEN:  Soft and  nontender.  EXTREMITIES:  No clubbing, cyanosis, or edema.  NEUROLOGIC:  Nonfocal.    LABORATORY DATA:  There are no new labs.    IMPRESSION:  History of multiple falls.  She did have a question of syncope and   concussion, significant cognitive deficits for over a year and most likely   consistent with mild-to-moderate Alzheimer's.  She does have family history of   that.  She has history of atherosclerotic heart disease, congestive heart   failure, hypertension, dyslipidemia, anemia, chronic sick sinus syndrome with a   pacer, diabetes type 2, and obesity.  Addendum-MME done was 14 out of 30 consistent with moderate dementia  PLAN:  We will continue PT, OT, ST.  We will check a mini-mental and if she is   going to be abnormal and if in the range of dementia, we will go ahead and start   Aricept 5 mg every day as well as Namenda 5 mg twice a day.  I did discuss all   the findings with the daughter and advice to get the book, 36-hour day, which   tells all the information about the earlier stage of dementia  all the way to the   severe stages and has all the resources in the book.  We will just use 10 units   of 70/30 in the morning and 8 units before supper.  We will continue to monitor   sugars and cover as needed.  We will continue to mobilize while    still waiting for placement.        ______________________________  MD NOELLE Hugo/LATOYAS  DD:  08/27/2023  Time:  01:42PM  DT:  08/27/2023  Time:  03:51PM  Job #:  [id number]      PROGRESS NOTE

## 2023-08-28 LAB
POCT GLUCOSE: 198 MG/DL (ref 70–110)
POCT GLUCOSE: 299 MG/DL (ref 70–110)

## 2023-08-28 PROCEDURE — 97530 THERAPEUTIC ACTIVITIES: CPT | Mod: CQ

## 2023-08-28 PROCEDURE — G0378 HOSPITAL OBSERVATION PER HR: HCPCS

## 2023-08-28 PROCEDURE — 97116 GAIT TRAINING THERAPY: CPT | Mod: CQ

## 2023-08-28 PROCEDURE — 25000003 PHARM REV CODE 250: Performed by: INTERNAL MEDICINE

## 2023-08-28 PROCEDURE — 63600175 PHARM REV CODE 636 W HCPCS: Performed by: INTERNAL MEDICINE

## 2023-08-28 RX ORDER — INSULIN ASPART 100 [IU]/ML
10 INJECTION, SUSPENSION SUBCUTANEOUS 2 TIMES DAILY WITH MEALS
Status: DISCONTINUED | OUTPATIENT
Start: 2023-08-28 | End: 2023-08-31 | Stop reason: HOSPADM

## 2023-08-28 RX ADMIN — INSULIN ASPART 10 UNITS: 100 INJECTION, SUSPENSION SUBCUTANEOUS at 09:08

## 2023-08-28 RX ADMIN — SPIRONOLACTONE 25 MG: 25 TABLET ORAL at 09:08

## 2023-08-28 RX ADMIN — OMEGA-3 FATTY ACIDS CAP 1000 MG 1 CAPSULE: 1000 CAP at 12:08

## 2023-08-28 RX ADMIN — OMEGA-3 FATTY ACIDS CAP 1000 MG 1 CAPSULE: 1000 CAP at 08:08

## 2023-08-28 RX ADMIN — MESALAMINE 400 MG: 400 CAPSULE, DELAYED RELEASE ORAL at 08:08

## 2023-08-28 RX ADMIN — APIXABAN 5 MG: 5 TABLET, FILM COATED ORAL at 08:08

## 2023-08-28 RX ADMIN — MEMANTINE 5 MG: 5 TABLET ORAL at 08:08

## 2023-08-28 RX ADMIN — INSULIN ASPART 10 UNITS: 100 INJECTION, SUSPENSION SUBCUTANEOUS at 08:08

## 2023-08-28 RX ADMIN — FUROSEMIDE 40 MG: 40 TABLET ORAL at 09:08

## 2023-08-28 RX ADMIN — LISINOPRIL 10 MG: 10 TABLET ORAL at 09:08

## 2023-08-28 RX ADMIN — CARVEDILOL 12.5 MG: 12.5 TABLET, FILM COATED ORAL at 09:08

## 2023-08-28 RX ADMIN — SERTRALINE HYDROCHLORIDE 100 MG: 50 TABLET ORAL at 09:08

## 2023-08-28 RX ADMIN — FERROUS SULFATE TAB 325 MG (65 MG ELEMENTAL FE) 1 EACH: 325 (65 FE) TAB at 09:08

## 2023-08-28 RX ADMIN — APIXABAN 5 MG: 5 TABLET, FILM COATED ORAL at 09:08

## 2023-08-28 RX ADMIN — BUDESONIDE 9 MG: 3 CAPSULE, COATED PELLETS ORAL at 09:08

## 2023-08-28 RX ADMIN — CARVEDILOL 12.5 MG: 12.5 TABLET, FILM COATED ORAL at 08:08

## 2023-08-28 RX ADMIN — FENOFIBRATE 145 MG: 145 TABLET, FILM COATED ORAL at 09:08

## 2023-08-28 RX ADMIN — MEMANTINE 5 MG: 5 TABLET ORAL at 09:08

## 2023-08-28 RX ADMIN — DONEPEZIL HYDROCHLORIDE 5 MG: 5 TABLET, FILM COATED ORAL at 08:08

## 2023-08-28 RX ADMIN — MESALAMINE 400 MG: 400 CAPSULE, DELAYED RELEASE ORAL at 12:08

## 2023-08-28 NOTE — PT/OT/SLP PROGRESS
Physical Therapy Treatment    Patient Name:  eDnny Mai   MRN:  [id number]    Recommendations:     Discharge Recommendations: other (see comments) (home with 24 hour care vs SNF/TCU)  Discharge Equipment Recommendations: none  Barriers to discharge: Decreased caregiver support and Impaired mobility    Assessment:     Denny Mai is a 88 y.o. female admitted with a medical diagnosis of Elevated troponin.  She presents with the following impairments/functional limitations: weakness, gait instability, impaired balance, decreased safety awareness, impaired self care skills, impaired cognition.    Rehab Prognosis: Good; patient would benefit from acute skilled PT services to address these deficits and reach maximum level of function.    Recent Surgery: * No surgery found *      Plan:     During this hospitalization, patient to be seen 5 x/week to address the identified rehab impairments via gait training, therapeutic activities, therapeutic exercises and progress toward the following goals:    Plan of Care Expires:  09/25/23    Subjective     Chief Complaint: none  Patient/Family Comments/goals: to go home soon  Pain/Comfort:  Pain Rating 1: 0/10      Objective:     Communicated with nurse Oneal prior to session.  Patient found sitting edge of bed with   upon PT entry to room.     General Precautions: Standard, fall  Orthopedic Precautions: N/A  Braces: N/A  Respiratory Status: Room air  Blood Pressure: NT  Skin Integrity: Visible skin intact      Functional Mobility:  Transfers:    Sit<->stand; SBA with RW  T/f to commode for void  Gait: 200' with slow but steady madhuri, intermittent veering but able to correct and avoid objects, intermittent decreased stance time LLE noted. CGA for safety    Therapeutic Activities/Exercises:  Assisted to restroom for void, CM arrived and pt appeared anxious to speak with her. Noted increased unsteadiness and unsafe use of RW when leaving the restroom to speak with CM.  Noted some confusion when going over the three locations her daughter chose. Reviewed list ~ 3 times to answer pt's questions. Pt left sitting EOB still reviewing list, did not appear pt understood meaning of the list despite going over it with the CM and therapist.     Education:  Patient provided with verbal education regarding POC.  Understanding was verbalized, however additional teaching warranted.     Patient left sitting edge of bed with all lines intact and call button in reach..    GOALS:   Multidisciplinary Problems       Physical Therapy Goals          Problem: Physical Therapy    Goal Priority Disciplines Outcome Goal Variances Interventions   Physical Therapy Goal     PT, PT/OT Ongoing, Progressing     Description: Goals to be met by: 2023     Patient will increase functional independence with mobility by performin. Sit to stand transfer with Modified [place]  2. Bed to chair transfer with Modified [place] using Rolling Walker  3. Gait  x 300 feet with Modified [place] using Rolling Walker.   4. Ascend/descend 2 steps with left Handrails Modified [place] using No Assistive Device.                          Time Tracking:     PT Received On: 23  PT Start Time: 1440     PT Stop Time: 1504  PT Total Time (min): 24 min     Billable Minutes: Gait Training 1 unit and Therapeutic Activity 1 unit    Treatment Type: Treatment  PT/PTA: PTA     Number of PTA visits since last PT visit: 2     2023

## 2023-08-28 NOTE — PLAN OF CARE
Received communication from Jihan at the [facility] that they are unable to accept the patient as they are out of network.    Made TELMA Fuentes aware of this and printed a list of facilities that are in network with the patient's insurance provider to help procure additional facilities of choice.

## 2023-08-28 NOTE — PLAN OF CARE
08/28/23 1429   Discharge Reassessment   Assessment Type Discharge Planning Reassessment   Did the patient's condition or plan change since previous assessment? No   Discharge Plan discussed with: Patient;Adult children  (Lori Bunch (Daughter)   [phone] (Home Phone))   Communicated RAIN with patient/caregiver Date not available/Unable to determine   Discharge Plan A Skilled Nursing Facility  (The following facilities were sent referrals for (SNF) services as per patient & daughter's (Lori Bunch) requests: 1. [facility] 2. [facility] 3. [facility] ([facility]).)   Discharge Plan B Skilled Nursing Facility   DME Needed Upon Discharge  none   Transition of Care Barriers None   Why the patient remains in the hospital Requires continued medical care   Post-Acute Status   Post-Acute Authorization Placement   Post-Acute Placement Status Referrals Sent   Coverage Payor:  HUMANA MANAGED MEDICARE - [facility]   Hospital Resources/Appts/Education Provided Appointments scheduled and added to AVS   Patient choice form signed by patient/caregiver List with quality metrics by geographic area provided   Discharge Delays None known at this time     Patient and her daughter: Lori Bunch were informed that the [facility] was unable to accept this patient due to her insurance being out-of-network. She and her daughter were given a lists of facilities for (SNF) services and voiced their preference in the following order: 1. [facility] 2. [facility] 3. [facility]-referral were sent and will notify patient & family of updates.

## 2023-08-29 LAB
ALBUMIN SERPL-MCNC: 3.8 G/DL (ref 3.4–4.8)
ALBUMIN/GLOB SERPL: 1.1 RATIO (ref 1.1–2)
ALP SERPL-CCNC: 51 UNIT/L (ref 40–150)
ALT SERPL-CCNC: 17 UNIT/L (ref 0–55)
AST SERPL-CCNC: 21 UNIT/L (ref 5–34)
BASOPHILS # BLD AUTO: 0.02 X10(3)/MCL
BASOPHILS NFR BLD AUTO: 0.2 %
BILIRUB SERPL-MCNC: 0.6 MG/DL
BUN SERPL-MCNC: 33.6 MG/DL (ref 9.8–20.1)
CALCIUM SERPL-MCNC: 9.9 MG/DL (ref 8.4–10.2)
CHLORIDE SERPL-SCNC: 94 MMOL/L (ref 98–107)
CO2 SERPL-SCNC: 27 MMOL/L (ref 23–31)
CREAT SERPL-MCNC: 1.23 MG/DL (ref 0.55–1.02)
EOSINOPHIL # BLD AUTO: 0.08 X10(3)/MCL (ref 0–0.9)
EOSINOPHIL NFR BLD AUTO: 0.8 %
ERYTHROCYTE [DISTWIDTH] IN BLOOD BY AUTOMATED COUNT: 14.9 % (ref 11.5–17)
GFR SERPLBLD CREATININE-BSD FMLA CKD-EPI: 42 MLS/MIN/1.73/M2
GLOBULIN SER-MCNC: 3.5 GM/DL (ref 2.4–3.5)
GLUCOSE SERPL-MCNC: 223 MG/DL (ref 82–115)
HCT VFR BLD AUTO: 39.4 % (ref 37–47)
HGB BLD-MCNC: 12.7 G/DL (ref 12–16)
IMM GRANULOCYTES # BLD AUTO: 0.04 X10(3)/MCL (ref 0–0.04)
IMM GRANULOCYTES NFR BLD AUTO: 0.4 %
LYMPHOCYTES # BLD AUTO: 1.17 X10(3)/MCL (ref 0.6–4.6)
LYMPHOCYTES NFR BLD AUTO: 11.8 %
MAGNESIUM SERPL-MCNC: 1.8 MG/DL (ref 1.6–2.6)
MCH RBC QN AUTO: 26.5 PG (ref 27–31)
MCHC RBC AUTO-ENTMCNC: 32.2 G/DL (ref 33–36)
MCV RBC AUTO: 82.3 FL (ref 80–94)
MONOCYTES # BLD AUTO: 0.8 X10(3)/MCL (ref 0.1–1.3)
MONOCYTES NFR BLD AUTO: 8.1 %
NEUTROPHILS # BLD AUTO: 7.77 X10(3)/MCL (ref 2.1–9.2)
NEUTROPHILS NFR BLD AUTO: 78.7 %
NRBC BLD AUTO-RTO: 0 %
PLATELET # BLD AUTO: 291 X10(3)/MCL (ref 130–400)
PMV BLD AUTO: 12.2 FL (ref 7.4–10.4)
POCT GLUCOSE: 118 MG/DL (ref 70–110)
POCT GLUCOSE: 166 MG/DL (ref 70–110)
POCT GLUCOSE: 275 MG/DL (ref 70–110)
POTASSIUM SERPL-SCNC: 4.4 MMOL/L (ref 3.5–5.1)
PROT SERPL-MCNC: 7.3 GM/DL (ref 5.8–7.6)
RBC # BLD AUTO: 4.79 X10(6)/MCL (ref 4.2–5.4)
SODIUM SERPL-SCNC: 134 MMOL/L (ref 136–145)
WBC # SPEC AUTO: 9.88 X10(3)/MCL (ref 4.5–11.5)

## 2023-08-29 PROCEDURE — 94761 N-INVAS EAR/PLS OXIMETRY MLT: CPT

## 2023-08-29 PROCEDURE — G0378 HOSPITAL OBSERVATION PER HR: HCPCS

## 2023-08-29 PROCEDURE — 80053 COMPREHEN METABOLIC PANEL: CPT | Performed by: INTERNAL MEDICINE

## 2023-08-29 PROCEDURE — 83735 ASSAY OF MAGNESIUM: CPT | Performed by: INTERNAL MEDICINE

## 2023-08-29 PROCEDURE — 25000003 PHARM REV CODE 250: Performed by: INTERNAL MEDICINE

## 2023-08-29 PROCEDURE — 97535 SELF CARE MNGMENT TRAINING: CPT | Mod: CO

## 2023-08-29 PROCEDURE — 97530 THERAPEUTIC ACTIVITIES: CPT | Mod: CQ

## 2023-08-29 PROCEDURE — 63600175 PHARM REV CODE 636 W HCPCS: Performed by: INTERNAL MEDICINE

## 2023-08-29 PROCEDURE — 97116 GAIT TRAINING THERAPY: CPT | Mod: CQ

## 2023-08-29 PROCEDURE — 85025 COMPLETE CBC W/AUTO DIFF WBC: CPT | Performed by: INTERNAL MEDICINE

## 2023-08-29 RX ORDER — INSULIN ASPART 100 [IU]/ML
10 INJECTION, SUSPENSION SUBCUTANEOUS 2 TIMES DAILY
Qty: 600 UNITS | Refills: 11 | Status: SHIPPED | OUTPATIENT
Start: 2023-08-30 | End: 2024-08-29

## 2023-08-29 RX ORDER — MEMANTINE HYDROCHLORIDE 5 MG/1
5 TABLET ORAL 2 TIMES DAILY
Qty: 60 TABLET | Refills: 11 | Status: SHIPPED | OUTPATIENT
Start: 2023-08-29 | End: 2024-08-28

## 2023-08-29 RX ORDER — POLYETHYLENE GLYCOL 3350 17 G/17G
17 POWDER, FOR SOLUTION ORAL DAILY
Status: DISCONTINUED | OUTPATIENT
Start: 2023-08-29 | End: 2023-08-31 | Stop reason: HOSPADM

## 2023-08-29 RX ORDER — DONEPEZIL HYDROCHLORIDE 5 MG/1
5 TABLET, FILM COATED ORAL NIGHTLY
Qty: 30 TABLET | Refills: 11 | Status: SHIPPED | OUTPATIENT
Start: 2023-08-29 | End: 2024-08-28

## 2023-08-29 RX ADMIN — INSULIN ASPART 10 UNITS: 100 INJECTION, SUSPENSION SUBCUTANEOUS at 05:08

## 2023-08-29 RX ADMIN — MEMANTINE 5 MG: 5 TABLET ORAL at 10:08

## 2023-08-29 RX ADMIN — MEMANTINE 5 MG: 5 TABLET ORAL at 09:08

## 2023-08-29 RX ADMIN — FUROSEMIDE 40 MG: 40 TABLET ORAL at 10:08

## 2023-08-29 RX ADMIN — SPIRONOLACTONE 25 MG: 25 TABLET ORAL at 10:08

## 2023-08-29 RX ADMIN — INSULIN ASPART 10 UNITS: 100 INJECTION, SUSPENSION SUBCUTANEOUS at 11:08

## 2023-08-29 RX ADMIN — SERTRALINE HYDROCHLORIDE 100 MG: 50 TABLET ORAL at 10:08

## 2023-08-29 RX ADMIN — OMEGA-3 FATTY ACIDS CAP 1000 MG 1 CAPSULE: 1000 CAP at 09:08

## 2023-08-29 RX ADMIN — BUDESONIDE 9 MG: 3 CAPSULE, COATED PELLETS ORAL at 10:08

## 2023-08-29 RX ADMIN — MESALAMINE 400 MG: 400 CAPSULE, DELAYED RELEASE ORAL at 10:08

## 2023-08-29 RX ADMIN — POLYETHYLENE GLYCOL 3350 17 G: 17 POWDER, FOR SOLUTION ORAL at 09:08

## 2023-08-29 RX ADMIN — APIXABAN 5 MG: 5 TABLET, FILM COATED ORAL at 10:08

## 2023-08-29 RX ADMIN — APIXABAN 5 MG: 5 TABLET, FILM COATED ORAL at 09:08

## 2023-08-29 RX ADMIN — FERROUS SULFATE TAB 325 MG (65 MG ELEMENTAL FE) 1 EACH: 325 (65 FE) TAB at 10:08

## 2023-08-29 RX ADMIN — CARVEDILOL 12.5 MG: 12.5 TABLET, FILM COATED ORAL at 10:08

## 2023-08-29 RX ADMIN — ONDANSETRON 4 MG: 4 TABLET, ORALLY DISINTEGRATING ORAL at 03:08

## 2023-08-29 RX ADMIN — MESALAMINE 400 MG: 400 CAPSULE, DELAYED RELEASE ORAL at 09:08

## 2023-08-29 RX ADMIN — OMEGA-3 FATTY ACIDS CAP 1000 MG 1 CAPSULE: 1000 CAP at 10:08

## 2023-08-29 RX ADMIN — CARVEDILOL 12.5 MG: 12.5 TABLET, FILM COATED ORAL at 09:08

## 2023-08-29 RX ADMIN — LISINOPRIL 10 MG: 10 TABLET ORAL at 10:08

## 2023-08-29 RX ADMIN — FENOFIBRATE 145 MG: 145 TABLET, FILM COATED ORAL at 10:08

## 2023-08-29 RX ADMIN — DONEPEZIL HYDROCHLORIDE 5 MG: 5 TABLET, FILM COATED ORAL at 09:08

## 2023-08-29 NOTE — PROGRESS NOTES
[facility]                       [address] [address]    PATIENT NAME:       UZAIR HILARIO  YOB: 1935  CSN:                [id number]   MRN:                [id number]  ADMIT DATE:         08/23/2023 13:56:00  PHYSICIAN:          Joao Serrano MD                            PROGRESS NOTE    DATE:      SUBJECTIVE:  This is an 88-year-old white female.  She is doing fairly well at   the present time.  She denies any shortness of breath, chest pain, palpitations,   headaches, or any other new problems.  The patient's blood pressure continues   to be in a good range.  She has been afebrile.  She is still with some   confusion.  Blood sugars still slightly elevated in the high 100s to low 200s.    No other significant new problems.    REVIEW OF SYSTEMS:  X12 as above.    OBJECTIVE:  VITAL SIGNS:  Blood pressure is 111/61, pulse 72, and temp 97.3.  GENERAL APPEARANCE:  She is alert, in no acute distress.  HEART:  Regular rhythm and rate.  LUNGS:  Clear.  ABDOMEN:  Soft and depressible, nontender.  EXTREMITIES:  No clubbing, cyanosis, or edema.  NEUROLOGIC:  Nonfocal.    LABORATORY DATA:  No new labs.    IMPRESSION:  History of multiple falls, questionable syncope and the patient had   a confusion, significant cognitive deficit deficits consistent with moderate   Alzheimer's.  She has arthrosclerotic heart disease, congestive heart failure,   valvular disease, hypertension, dyslipidemia, sick sinus syndrome with a pacer,   history of anemia, diabetes mellitus type 2 with slightly elevated sugars.  She   has history of osteoarthritis and Crohn disease.    PLAN:  We will continue with current medications.  She just started her insulin,   so we will continue to monitor sugars and continue to adjust as needed.  We   will continue with PT, OT, ST. The patient denied from the chosen SNF due to the   insurance  not covering and other facilities were sent referrals.  The patient   waiting for placement.        ______________________________  MD NOELLE Hugo/SUSIE  DD:  08/28/2023  Time:  07:48PM  DT:  08/28/2023  Time:  09:38PM  Job #:  [id number]      PROGRESS NOTE

## 2023-08-29 NOTE — PT/OT/SLP PROGRESS
Physical Therapy Treatment    Patient Name:  Denny Mai   MRN:  [id number]    Recommendations:     Discharge Recommendations: other (see comments) (home with 24 hour care vs basic, nusing facility)  Discharge Equipment Recommendations: none  Barriers to discharge: Decreased caregiver support and Impaired mobility    Assessment:     Denny Mai is a 88 y.o. female admitted with a medical diagnosis of Elevated troponin.  She presents with the following impairments/functional limitations: weakness, gait instability, impaired endurance, impaired balance, impaired self care skills, impaired functional mobility, decreased safety awareness, impaired cognition.    Rehab Prognosis: Good; patient would benefit from acute skilled PT services to address these deficits and reach maximum level of function.    Recent Surgery: * No surgery found *      Plan:     During this hospitalization, patient to be seen 5 x/week to address the identified rehab impairments via gait training, therapeutic activities, therapeutic exercises and progress toward the following goals:    Plan of Care Expires:  09/25/23    Subjective     Chief Complaint: nausea and constipation  Patient/Family Comments/goals: to go home soon  Pain/Comfort:  Pain Rating 1: other (see comments) (abdominal discomfort from constipation)      Objective:     Communicated with nurse prior to session.  Patient found up in chair with   daughter visiting upon PT entry to room.     General Precautions: Standard, fall  Orthopedic Precautions: N/A  Braces: N/A  Respiratory Status: Room air  Blood Pressure: 140/66, HR 69  Skin Integrity: Visible skin intact      Functional Mobility:  Transfers:    Sit<->stand; SBA with RW  T/f to commode for BM; checked BP and monitored status 2/2 dizziness and nausea which passed while seated on commode. BP stable.   Gait: 200' with Rollator, slow madhuri, decreased stance time RLE with R trunk lean, unable to complete task 2/2  increased dizziness. Sat on rollator and brought back to room.    Therapeutic Activities/Exercises:  Left sitting on commode with daughter. Notified RN of pts complaints and dizziness with ambulation. Symptoms resolved while seated on commode.     Education:  Patient provided with verbal education regarding POC and safety.  Understanding was verbalized, however additional teaching warranted.     Patient left  sitting on commode  with all lines intact and call button in reach. Requested to remain on commode to attempt a BM.     GOALS:   Multidisciplinary Problems       Physical Therapy Goals          Problem: Physical Therapy    Goal Priority Disciplines Outcome Goal Variances Interventions   Physical Therapy Goal     PT, PT/OT Ongoing, Progressing     Description: Goals to be met by: 2023     Patient will increase functional independence with mobility by performin. Sit to stand transfer with Modified [place]  2. Bed to chair transfer with Modified [place] using Rolling Walker  3. Gait  x 300 feet with Modified [place] using Rolling Walker.   4. Ascend/descend 2 steps with left Handrails Modified [place] using No Assistive Device.                          Time Tracking:     PT Received On: 23  PT Start Time: 1437     PT Stop Time: 1500  PT Total Time (min): 23 min     Billable Minutes: Gait Training 1 unit and Therapeutic Activity 1 unit    Treatment Type: Treatment  PT/PTA: PTA     Number of PTA visits since last PT visit: 3     2023

## 2023-08-29 NOTE — PROGRESS NOTES
Inpatient Nutrition Evaluation    Admit Date: 8/23/2023   Total duration of encounter: 6 days    Nutrition Recommendation/Prescription     Trial Soft & Bite-sized/Diabetic diet    Nutrition Assessment     Chart Review    Reason Seen: length of stay    Malnutrition Screening Tool Results   Have you recently lost weight without trying?: No  Have you been eating poorly because of a decreased appetite?: No   MST Score: 0     Diagnosis:  Multiple Falls, Confusion, Cognitive problems, DM     Relevant Medical History:   Past Medical History:   Diagnosis Date    Anemia, unspecified     CAD (coronary artery disease)     Crohn disease     DM (diabetes mellitus)     History of coronary angioplasty with insertion of stent     HLD (hyperlipidemia)     HTN (hypertension)     Memory loss      No past surgical history on file.  Review of patient's allergies indicates:   Allergen Reactions    Centratex [iron-folic acid-mv, min cmb#15]     Cephalexin     Erythromycin     Hydroxyzine     Iodinated contrast media     Iodine     Naldecon dx     Penicillins Other (See Comments)     unknown    Tetanus vaccines and toxoid     Vioxx [rofecoxib]         Nutrition-Related Medications:    apixaban  5 mg Oral BID    budesonide  9 mg Oral Daily    carvediloL  12.5 mg Oral BID    donepeziL  5 mg Oral QHS    fenofibrate  145 mg Oral Daily    ferrous sulfate  1 tablet Oral Daily    furosemide  40 mg Oral Daily    insulin asp prt-insulin aspart (NovoLOG 70/30)  10 Units Subcutaneous BIDWM    lisinopriL  10 mg Oral Daily    memantine  5 mg Oral BID    mesalamine  400 mg Oral BID    omega 3-dha-epa-fish oil  1 capsule Oral BID    sertraline  100 mg Oral Daily    spironolactone  25 mg Oral Daily         aluminum-magnesium hydroxide-simethicone, dextrose 10%, dextrose 10%, glucagon (human recombinant), glucose, glucose, insulin aspart U-100, melatonin, ondansetron, sodium chloride 0.9%   Calorie Containing IV Medications: no significant kcals from  medications at this time    Nutrition-Related Labs:  Hemoglobin A1c   Date Value Ref Range Status   03/21/2023 7.5 (H) <=7.0 % Final   09/15/2022 7.2 (H) <=7.0 % Final   12/01/2021 7.4 (H) <<=7.0 % Final     8/23/2023: Magnesium Level 1.80 mg/dL (Ref range: 1.60 - 2.60 mg/dL); Potassium Level 3.9 mmol/L (Ref range: 3.5 - 5.1 mmol/L); Sodium Level 138 mmol/L (Ref range: 136 - 145 mmol/L)   Recent Labs   Lab 08/23/23  1418   WBC 6.74   RBC 4.59   HGB 12.1   HCT 36.4*   MCV 79.3*   MCH 26.4*   MCHC 33.2     Recent Labs   Lab 08/23/23  1418      K 3.9   CO2 28   BUN 29.5*   CREATININE 1.02   CALCIUM 10.2   MG 1.80   ALBUMIN 3.6   ALKPHOS 47   ALT 14   AST 22   BILITOT 0.5       Diet Order: Diet heart healthy Diabetic  Oral Supplement Order: none  Appetite/Oral Intake: good/% of meals  Factors Affecting Nutritional Intake: none identified  Food/Yazidi/Cultural Preferences: none reported  Food Allergies: none reported    Skin Integrity: bruised (ecchymotic)  Wound(s):      Altered Skin Integrity 08/24/23 1400 Sacral spine Intact skin with non-blanchable redness of localized area-Tissue loss description: Not applicable     Comments    8/29/23: Pt and daughter report good appetite/po intake, tolerating diet, only GI complaint is constipation which pt also struggles w/ @ baseline, did have small BM today, weight has been steady, chews well but sometimes coughs when eating - would like to try soft & bite sized diet. Daughter reports that pt has been gravitating towards more high sugar/high glycemic processed foods such as crackers, snack foods, ice cream, etc. Discussed importance of nutrient dense foods, diabetic diet, importance of good glucose control. Gave pt suggestions for low sugar ice cream brands. Patient was fully alert, asked appropriate questions, answered all my questions appropriately. Daughter reports that pt more alert since eating better while in the hospital.     Anthropometrics    Height: 5'  "4" (162.6 cm)    Last Weight: 85.2 kg (187 lb 13.3 oz) (08/28/23 2339) Weight Method: Estimated  BMI (Calculated): 32.2  BMI Classification: obese grade I (BMI 30-34.9)     Ideal Body Weight (IBW), Female: 120 lb     % Ideal Body Weight, Female (lb): 156.53 %                             Usual Weight Provided By: patient and family/[id number] lbs     Wt Readings from Last 5 Encounters:   08/28/23 85.2 kg (187 lb 13.3 oz)   09/25/22 86.2 kg (190 lb)     Weight Change(s) Since Admission:  Admit Weight: 76.7 kg (169 lb) (08/23/23 1339)      Patient Education    Education Provided: diabetic diet  Teaching Method: explanation and printed materials  Comprehension: verbalizes understanding  Barriers to Learning: none evident  Expected Compliance: good  Comments: All questions were answered and dietitian's contact information was provided.     Monitoring & Evaluation     Dietitian will monitor food and beverage intake, weight, electrolyte/renal panel, and glucose/endocrine profile.  Nutrition Risk/Follow-Up: low (follow-up in 5-7 days)  Patients assigned 'low nutrition risk' status do not qualify for a full nutritional assessment but will be monitored and re-evaluated in a 5-7 day time period. Please consult if re-evaluation needed sooner.   "

## 2023-08-29 NOTE — PT/OT/SLP PROGRESS
Occupational Therapy   Treatment    Name: Denny Mai  MRN: [id number]  Admitting Diagnosis:  Elevated troponin       Recommendations:     Discharge Recommendations:  (home with increased care; swing bed/TCU)  Discharge Equipment Recommendations:  none  Barriers to discharge:       Assessment:     Denny Mai is a 88 y.o. female with a medical diagnosis of Elevated troponin.  She presents with good motivation. Performance deficits affecting function are weakness, gait instability, impaired balance, impaired endurance, decreased safety awareness, impaired self care skills, impaired functional mobility.     Rehab Prognosis:  Good; patient would benefit from acute skilled OT services to address these deficits and reach maximum level of function.       Plan:     Patient to be seen 3 x/week to address the above listed problems via self-care/home management, therapeutic activities, therapeutic exercises  Plan of Care Expires: 09/22/23  Plan of Care Reviewed with: patient    Subjective     Pain/Comfort:  No complaints    Objective:     Communicated with: nurse prior to session.  Patient found sitting edge of bed with peripheral IV upon OT entry to room.    General Precautions: Standard, fall    Orthopedic Precautions:N/A  Braces: N/A  Respiratory Status: Room air     Occupational Performance:     Functional Mobility/Transfers:  Sit to stand with SBA, ambulate in room with SBA,open doors and move through obstacles with SBA    Activities of Daily Living:  Don/doff socks with SBA from edge of bed    [facility] 6 Click ADL: 22    Patient Education:  Patient provided with verbal and demonstration education regarding safety awareness.  Patient was able to return demonstration.      Patient left sitting edge of bed with all lines intact and call button in reach    GOALS:   Multidisciplinary Problems       Occupational Therapy Goals          Problem: Occupational Therapy    Goal Priority Disciplines Outcome Interventions    Occupational Therapy Goal     OT, PT/OT Ongoing, Progressing    Description: Goals to be met by: 9/22/23     Patient will increase functional independence with ADLs by performing:    UE Dressing with [place].  LE Dressing with Modified [place].  Grooming while standing with Modified [place].  Toileting from toilet with Modified [place] for hygiene and clothing management.   Increase functional strength to WFL for increased self-care skills.                       Time Tracking:     OT Date of Treatment: 08/29/23  OT Start Time: 1548  OT Stop Time: 1558  OT Total Time (min): 10 min    Billable Minutes:Self Care/Home Management 10    OT/DANA: DANA     Number of DANA visits since last OT visit: 1    8/29/2023

## 2023-08-30 LAB
POCT GLUCOSE: 128 MG/DL (ref 70–110)
POCT GLUCOSE: 166 MG/DL (ref 70–110)
POCT GLUCOSE: 187 MG/DL (ref 70–110)
POCT GLUCOSE: 190 MG/DL (ref 70–110)
SARS-COV-2 RDRP RESP QL NAA+PROBE: NEGATIVE

## 2023-08-30 PROCEDURE — 63600175 PHARM REV CODE 636 W HCPCS: Performed by: INTERNAL MEDICINE

## 2023-08-30 PROCEDURE — 25000003 PHARM REV CODE 250: Performed by: INTERNAL MEDICINE

## 2023-08-30 PROCEDURE — 87635 SARS-COV-2 COVID-19 AMP PRB: CPT | Performed by: INTERNAL MEDICINE

## 2023-08-30 PROCEDURE — G0378 HOSPITAL OBSERVATION PER HR: HCPCS

## 2023-08-30 PROCEDURE — 97116 GAIT TRAINING THERAPY: CPT | Mod: CQ

## 2023-08-30 RX ORDER — POLYETHYLENE GLYCOL 3350 17 G/17G
17 POWDER, FOR SOLUTION ORAL DAILY
Qty: 30 PACKET | Refills: 0 | Status: SHIPPED | OUTPATIENT
Start: 2023-08-31

## 2023-08-30 RX ORDER — FUROSEMIDE 20 MG/1
20 TABLET ORAL DAILY
Status: DISCONTINUED | OUTPATIENT
Start: 2023-08-31 | End: 2023-08-31 | Stop reason: HOSPADM

## 2023-08-30 RX ORDER — FUROSEMIDE 20 MG/1
20 TABLET ORAL DAILY
Qty: 30 TABLET | Refills: 11 | Status: SHIPPED | OUTPATIENT
Start: 2023-08-31 | End: 2024-08-30

## 2023-08-30 RX ORDER — FUROSEMIDE 20 MG/1
20 TABLET ORAL DAILY
Status: DISCONTINUED | OUTPATIENT
Start: 2023-08-31 | End: 2023-08-30

## 2023-08-30 RX ADMIN — BUDESONIDE 9 MG: 3 CAPSULE, COATED PELLETS ORAL at 08:08

## 2023-08-30 RX ADMIN — OMEGA-3 FATTY ACIDS CAP 1000 MG 1 CAPSULE: 1000 CAP at 08:08

## 2023-08-30 RX ADMIN — MESALAMINE 400 MG: 400 CAPSULE, DELAYED RELEASE ORAL at 08:08

## 2023-08-30 RX ADMIN — SERTRALINE HYDROCHLORIDE 100 MG: 50 TABLET ORAL at 08:08

## 2023-08-30 RX ADMIN — APIXABAN 5 MG: 5 TABLET, FILM COATED ORAL at 08:08

## 2023-08-30 RX ADMIN — INSULIN ASPART 10 UNITS: 100 INJECTION, SUSPENSION SUBCUTANEOUS at 07:08

## 2023-08-30 RX ADMIN — MEMANTINE 5 MG: 5 TABLET ORAL at 08:08

## 2023-08-30 RX ADMIN — INSULIN ASPART 10 UNITS: 100 INJECTION, SUSPENSION SUBCUTANEOUS at 08:08

## 2023-08-30 RX ADMIN — FERROUS SULFATE TAB 325 MG (65 MG ELEMENTAL FE) 1 EACH: 325 (65 FE) TAB at 08:08

## 2023-08-30 RX ADMIN — FUROSEMIDE 40 MG: 40 TABLET ORAL at 08:08

## 2023-08-30 RX ADMIN — DONEPEZIL HYDROCHLORIDE 5 MG: 5 TABLET, FILM COATED ORAL at 08:08

## 2023-08-30 RX ADMIN — POLYETHYLENE GLYCOL 3350 17 G: 17 POWDER, FOR SOLUTION ORAL at 08:08

## 2023-08-30 RX ADMIN — SPIRONOLACTONE 25 MG: 25 TABLET ORAL at 08:08

## 2023-08-30 RX ADMIN — CARVEDILOL 12.5 MG: 12.5 TABLET, FILM COATED ORAL at 08:08

## 2023-08-30 RX ADMIN — LISINOPRIL 10 MG: 10 TABLET ORAL at 08:08

## 2023-08-30 RX ADMIN — FENOFIBRATE 145 MG: 145 TABLET, FILM COATED ORAL at 08:08

## 2023-08-30 NOTE — PT/OT/SLP PROGRESS
Physical Therapy Treatment    Patient Name:  Denny Mai   MRN:  [id number]    Recommendations:     Discharge Recommendations: nursing facility, basic  Discharge Equipment Recommendations: none  Barriers to discharge: None    Assessment:     Denny Mai is a 88 y.o. female admitted with a medical diagnosis of Elevated troponin.  She presents with the following impairments/functional limitations: weakness, gait instability, impaired endurance, impaired balance, impaired self care skills, impaired functional mobility, decreased safety awareness, impaired cognition .    Rehab Prognosis: Good; patient would benefit from acute skilled PT services to address these deficits and reach maximum level of function.    Recent Surgery: * No surgery found *      Plan:     During this hospitalization, patient to be seen 5 x/week to address the identified rehab impairments via gait training, therapeutic activities, therapeutic exercises and progress toward the following goals:    Plan of Care Expires:  09/25/23    Subjective     Chief Complaint:   Patient/Family Comments/goals:   Pain/Comfort:  Pain Rating 1: 0/10      Objective:     Communicated with NSG prior to session.  Patient found  sitting on couch  upon PT entry to room.     General Precautions: Standard, fall  Orthopedic Precautions: N/A  Braces: N/A  Respiratory Status: Room air  Blood Pressure:   Skin Integrity: Visible skin intact      Functional Mobility:  Transfers:     Sit to Stand:  stand by assistance with rollator from couch  Gait: pt amb 140ft 2x will rollator SBA.pt required one seated rest on rollator between trials      Patient left  sitting on couch  with call button in reach..    GOALS:   Multidisciplinary Problems       Physical Therapy Goals          Problem: Physical Therapy    Goal Priority Disciplines Outcome Goal Variances Interventions   Physical Therapy Goal     PT, PT/OT Ongoing, Progressing     Description: Goals to be met by: 09/25/2023      Patient will increase functional independence with mobility by performin. Sit to stand transfer with Modified [place]  2. Bed to chair transfer with Modified [place] using Rolling Walker  3. Gait  x 300 feet with Modified [place] using Rolling Walker.   4. Ascend/descend 2 steps with left Handrails Modified [place] using No Assistive Device.                          Time Tracking:     PT Received On: 23  PT Start Time: 1437     PT Stop Time: 1453  PT Total Time (min): 16 min     Billable Minutes: Gait Training 16    Treatment Type: Treatment  PT/PTA: PTA     Number of PTA visits since last PT visit: 4     2023

## 2023-08-30 NOTE — PLAN OF CARE
08/30/23 0934   Discharge Reassessment   Assessment Type Discharge Planning Reassessment   Did the patient's condition or plan change since previous assessment? No   Discharge Plan discussed with: Patient;Adult children  (Lori Bunch (Daughter)   [phone] (Home Phone))   Communicated RAIN with patient/caregiver Yes  (Patient has been accepted by [facility] and presently awaiting insurance authorization at this time.)   Discharge Plan A Skilled Nursing Facility  (FOC: Received notification that patient has been accepted by [facility] and that they are currently awaiting insurance authorization.)   Discharge Plan B Skilled Nursing Facility   DME Needed Upon Discharge  none   Transition of Care Barriers None   Why the patient remains in the hospital Requires continued medical care   Post-Acute Status   Post-Acute Authorization Placement   Post-Acute Placement Status Pending payor review/awaiting authorization (if required)   Coverage Payor:  HUMANA MANAGED MEDIC[facility]   Hospital Resources/Appts/Education Provided Appointments scheduled and added to AVS   Patient choice form signed by patient/caregiver List with quality metrics by geographic area provided   Discharge Delays None known at this time     Patient was accepted to [facility] for (SNF) services as per patient and daughter's preference. The facility is presently awaiting insurance authorization. Clinical notes/updates were sent via fax to the facility by JAROCHO Ward.

## 2023-08-31 VITALS
SYSTOLIC BLOOD PRESSURE: 108 MMHG | OXYGEN SATURATION: 95 % | HEART RATE: 69 BPM | TEMPERATURE: 98 F | HEIGHT: 64 IN | DIASTOLIC BLOOD PRESSURE: 64 MMHG | RESPIRATION RATE: 17 BRPM | WEIGHT: 187.81 LBS | BODY MASS INDEX: 32.06 KG/M2

## 2023-08-31 LAB
POCT GLUCOSE: 166 MG/DL (ref 70–110)
POCT GLUCOSE: 275 MG/DL (ref 70–110)

## 2023-08-31 PROCEDURE — G0378 HOSPITAL OBSERVATION PER HR: HCPCS

## 2023-08-31 PROCEDURE — 63600175 PHARM REV CODE 636 W HCPCS: Performed by: INTERNAL MEDICINE

## 2023-08-31 PROCEDURE — 96372 THER/PROPH/DIAG INJ SC/IM: CPT | Performed by: INTERNAL MEDICINE

## 2023-08-31 PROCEDURE — 97116 GAIT TRAINING THERAPY: CPT | Mod: CQ

## 2023-08-31 PROCEDURE — 25000003 PHARM REV CODE 250: Performed by: INTERNAL MEDICINE

## 2023-08-31 RX ORDER — FUROSEMIDE 20 MG/1
20 TABLET ORAL DAILY
Qty: 30 TABLET | Refills: 11 | Status: SHIPPED | OUTPATIENT
Start: 2023-09-01 | End: 2024-08-31

## 2023-08-31 RX ADMIN — INSULIN ASPART 3 UNITS: 100 INJECTION, SOLUTION INTRAVENOUS; SUBCUTANEOUS at 11:08

## 2023-08-31 RX ADMIN — MESALAMINE 400 MG: 400 CAPSULE, DELAYED RELEASE ORAL at 09:08

## 2023-08-31 RX ADMIN — CARVEDILOL 12.5 MG: 12.5 TABLET, FILM COATED ORAL at 09:08

## 2023-08-31 RX ADMIN — FUROSEMIDE 20 MG: 20 TABLET ORAL at 09:08

## 2023-08-31 RX ADMIN — BUDESONIDE 9 MG: 3 CAPSULE, COATED PELLETS ORAL at 09:08

## 2023-08-31 RX ADMIN — LISINOPRIL 10 MG: 10 TABLET ORAL at 09:08

## 2023-08-31 RX ADMIN — MEMANTINE 5 MG: 5 TABLET ORAL at 09:08

## 2023-08-31 RX ADMIN — FERROUS SULFATE TAB 325 MG (65 MG ELEMENTAL FE) 1 EACH: 325 (65 FE) TAB at 09:08

## 2023-08-31 RX ADMIN — SPIRONOLACTONE 25 MG: 25 TABLET ORAL at 09:08

## 2023-08-31 RX ADMIN — FENOFIBRATE 145 MG: 145 TABLET, FILM COATED ORAL at 09:08

## 2023-08-31 RX ADMIN — SERTRALINE HYDROCHLORIDE 100 MG: 50 TABLET ORAL at 09:08

## 2023-08-31 RX ADMIN — INSULIN ASPART 10 UNITS: 100 INJECTION, SUSPENSION SUBCUTANEOUS at 08:08

## 2023-08-31 RX ADMIN — APIXABAN 5 MG: 5 TABLET, FILM COATED ORAL at 09:08

## 2023-08-31 RX ADMIN — OMEGA-3 FATTY ACIDS CAP 1000 MG 1 CAPSULE: 1000 CAP at 09:08

## 2023-08-31 NOTE — NURSING
Patient stable upon discharge. Discharge education and instructions given prior to discharge. Patient left via transport to [facility].

## 2023-08-31 NOTE — PROGRESS NOTES
[facility]                       [address] [address]    PATIENT NAME:       UZAIR HILARIO  YOB: 1935  CSN:                [id number]   MRN:                [id number]  ADMIT DATE:         08/23/2023 13:56:00  PHYSICIAN:          Joao Serrano MD                            PROGRESS NOTE    DATE:  08/29/2023 00:00:00    SUBJECTIVE:  This is an 88-year-old white female.  She was admitted on the 23rd   of this month after having a syncopal episode and a concussion.  She seems to   have been confused for the last year and she was diagnosed with moderate   dementia.  She was consulted through SNF for therapy since she has some frequent   falls recently and for possible permanent placement.  She has not had any   shortness of breath.  No chest pain or palpitations.  No headaches or any other   problems. For some reason, it seems like they have been holding the insulin and   her sugars have been in the high side.  No other significant issues.    REVIEW OF SYSTEMS:  X12 as above.    OBJECTIVE:  VITAL SIGNS:  Blood pressure is 123/68, pulse 86, temp 97.9.  GENERAL APPEARANCE:  Alert, in no acute distress.  HEART:  Regular rhythm and rate.  LUNGS:  Clear.  ABDOMEN:  Soft, nontender.  EXTREMITIES:  No clubbing, cyanosis, or edema.  NEUROLOGIC:  Nonfocal.    LABORATORY DATA:  Remarkable for a white cell count of 9.88, H and H 12.7, 39.4   with a platelet count of 291.  Her BUN is 33.6, creatinine of 1.23, magnesium   1.8.    IMPRESSION:  Multiple falls with a possible syncope and confusion, significant   cognitive deficits consistent with moderate Alzheimer's, atherosclerotic heart   disease, congestive heart failure, valvular disease, hypertension, dyslipidemia,   sick sinus syndrome with a pacer, history of anemia, diabetes type 2, and   osteoarthritis and Crohn disease.    PLAN:  We will continue with  therapy.  We will continue with current   medications.  We will make sure that we will send the nurse and order not to   miss her insulin unless the sugars are below 80.  We will continue mobilization   and DVT prophylaxis with Eliquis.        ______________________________  MD NOELLE Hugo/SUSIE  DD:  08/30/2023  Time:  07:10PM  DT:  08/30/2023  Time:  08:46PM  Job #:  [id number]      PROGRESS NOTE

## 2023-08-31 NOTE — PROGRESS NOTES
PATIENT NAME:       UZAIR HILARIO  YOB: 1935  CSN:                [id number]   MRN:                [id number]  ADMIT DATE:         08/23/2023 13:56:00  PHYSICIAN:          Joao Serrano MD                            PROGRESS NOTE    DATE:  08/30/2023 00:00:00    SUBJECTIVE:  This is an 88-year-old white female.  She has been doing fairly   well.  She denies any shortness of breath, chest pain, palpitations, headaches,   or any other problems today.  She has been eating her breakfast at the time that   I saw her.  I let her know that she is about to go to the rehab place in SNF.    No significant issues.    REVIEW OF SYSTEMS:  X12 as above.    OBJECTIVE:  VITAL SIGNS:  Blood pressure 123/68, pulse 71, and temperature 97.9.  GENERAL APPEARANCE:  She is alert, in no acute distress.  HEART: Regular rhythm and rate.  LUNGS: Clear.  ABDOMEN: Soft, nontender.  EXTREMITIES: No clubbing, cyanosis, or edema. NEUROLOGIC: Nonfocal.    LABORATORY DATA:  No new labs.    IMPRESSION:  Multiple falls, syncope, confusion. She has Alzheimer's,   atherosclerotic heart disease, congestive heart failure, valvular disease,   hypertension, dyslipidemia, sick sinus syndrome with a pacer, diabetes type 2   with better sugars at the present time, osteoarthritis, and Crohn disease.    PLAN:  The patient will continue with therapy. We are waiting for placement at   SNF.  This has been worked up by the .  We will continue Lasix 20 mg   in the morning since she was slight prerenal.        ______________________________  Joao Serrano MD    NOELLE/AQS  DD:  08/30/2023  Time:  07:10PM  DT:  08/30/2023  Time:  09:04PM  Job #:  [id number]      PROGRESS NOTE

## 2023-08-31 NOTE — PT/OT/SLP PROGRESS
Physical Therapy Treatment    Patient Name:  Denny Mai   MRN:  [id number]    Recommendations:     Discharge Recommendations:  nursing facility, basic   Discharge Equipment Recommendations: none     Subjective     Patient awake and alert.     Objective:     General Precautions: Standard, fall   Orthopedic Precautions:N/A   Braces:    Respiratory Status: Room air  Communicated with nurse prior to treatment.     Functional Mobility:    Rolling:Independent  Supine to sit:Independent  Sit to stand transfer: Supervision or Set-up Assistance  Bed to chair transfer:Supervision or Set-up Assistance    Gait 200 ft x 2 with rollator SBA with no LOB and good gait sequence. Educated on safety while using rollator.     Education Provided:  Role and goals of PT, transfer training, bed mobility, gait training, balance training, safety awareness, assistive device, strengthening exercises, and importance of participating in PT to return to PLOF.         Skin Integrity: Visible skin intact    PT interventions performed during the course of today's session in an effort to prevent and/or reduce acquired pressure injuries:   Therapeutic positioning completed       GOALS:   Multidisciplinary Problems       Physical Therapy Goals          Problem: Physical Therapy    Goal Priority Disciplines Outcome Goal Variances Interventions   Physical Therapy Goal     PT, PT/OT Ongoing, Progressing     Description: Goals to be met by: 2023     Patient will increase functional independence with mobility by performin. Sit to stand transfer with Modified [place]  2. Bed to chair transfer with Modified [place] using Rolling Walker  3. Gait  x 300 feet with Modified [place] using Rolling Walker.   4. Ascend/descend 2 steps with left Handrails Modified [place] using No Assistive Device.                          Assessment:     Denny Mai is a 88 y.o. female admitted with a medical diagnosis of Elevated  troponin.     Patient Active Problem List   Diagnosis   (none) - all problems resolved or deleted        Rehab Prognosis: Good; patient would benefit from acute skilled PT services to address these deficits and reach maximum level of function.    Recent Surgery: * No surgery found *      Plan:     During this hospitalization, patient to be seen 5 x/week to address the identified rehab impairments via gait training, therapeutic activities, therapeutic exercises and progress toward the following goals:    Plan of Care Expires:  09/25/23    Billable Minutes     Billable Minutes: Gait Training 12    Treatment Type: Treatment  PT/PTA: PTA     Number of PTA visits since last PT visit: 5     08/31/2023

## 2023-09-04 NOTE — DISCHARGE SUMMARY
[facility]                       [address] [address]    PATIENT NAME:       UZAIR HILARIO  YOB: 1935  CSN:                [id number]   MRN:                [id number]  ADMIT DATE:         08/23/2023 13:56:00  PHYSICIAN:          Joao Serrano MD                          DISCHARGE SUMMARY    DATE OF DISCHARGE:  08/31/2023 17:51:00    HOSPITAL COURSE:  An 88-year-old  female, patient of Dr. Briceño.  She was admitted initially on the 31st of August, 2023, this is   after several falls.  At one point, she did have a questionable syncope, but she   was found after she was in the floor, she did have some headaches and nausea   and vomiting at that time consistent with a confusion.  Her blood pressure was   in the high 90s systolic and weak.  She was weak at her admission.  She has been   having fatigue.  She had some nausea and vomiting.  She had a slight increase   in the troponin up on admission and she was evaluated by Cardiology, which they   disregarded as an incidental finding.  She started to have PT, OT, and ST.  She   was diagnosed with dementia, which she has had for the last year with mild   cognitive impairment and confusion.  After total conversations with daughter,   she needed 24/7 care and she had some help a few hours a day only.  She had   elevated blood sugars, because there has been no known of her insulin available   in the hospital and we had to get it from her home.  She was placed on sliding   scale and she was covered as needed and  was consulted for   placement.  She was found to have glucosuria and a possible UTI.  She was placed   on home meds as able and DVT prophylaxis with Eliquis.  Wound care nurse was   consulted and followed her throughout her hospital stay.  She did well and she   was eventually discharged to SNF in good and stable  condition.    FINAL DIAGNOSES:    1. Multiple falls, possible syncope.  2. She has history of hypertension.  3. Significant chronic disease consistent with Alzheimer's of moderate degree.  4. She has atherosclerotic heart disease.  5. CHF.  6. Valvular disease.  7. Dyslipidemia.  8. Sick sinus syndrome with a pacer.  9. Questionable cardiac arrhythmia, possibly PAF since she is on   anticoagulation.  10. History of Crohn's disease.  11. Dyslipidemia.  12. Diabetes type 2.  13. History of osteoarthritis.  14. Significant deconditioning.  15. She has history of congestive heart failure.  16. History of iron deficiency anemia in the past.  17. She had a questionable of chest pain.  Please refer to discharge paper for complete list of the medications, diet, and   medical treatment.  She will continue with therapy at SNF and she probably will   end up, has a permanent resident.        ______________________________  MD NOELLE Hugo/SUSIE  DD:  09/04/2023  Time:  01:07PM  DT:  09/04/2023  Time:  01:38PM  Job #:  [id number]    cc:   Dr. Briceño        DISCHARGE SUMMARY

## 2024-10-24 ENCOUNTER — HOSPITAL ENCOUNTER (EMERGENCY)
Facility: HOSPITAL | Age: 89
Discharge: HOME | End: 2024-10-25
Attending: EMERGENCY MEDICINE
Payer: MEDICARE

## 2024-10-24 DIAGNOSIS — N30.00 ACUTE CYSTITIS WITHOUT HEMATURIA: ICD-10-CM
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
## 2024-10-24 LAB
ALBUMIN SERPL-MCNC: 3.1 G/DL (ref 3.4–4.8)
ALBUMIN/GLOB SERPL: 1 RATIO (ref 1.1–2)
ALP SERPL-CCNC: 51 UNIT/L (ref 40–150)
ALT SERPL-CCNC: 11 UNIT/L (ref 0–55)
ANION GAP SERPL CALC-SCNC: 9 MEQ/L
AST SERPL-CCNC: 12 UNIT/L (ref 5–34)
BASOPHILS # BLD AUTO: 0.01 X10(3)/MCL
BASOPHILS NFR BLD AUTO: 0.1 %
BILIRUB SERPL-MCNC: 0.6 MG/DL
BUN SERPL-MCNC: 17 MG/DL (ref 9.8–20.1)
CALCIUM SERPL-MCNC: 9.1 MG/DL (ref 8.4–10.2)
CHLORIDE SERPL-SCNC: 103 MMOL/L (ref 98–107)
CO2 SERPL-SCNC: 27 MMOL/L (ref 23–31)
CREAT SERPL-MCNC: 0.91 MG/DL (ref 0.55–1.02)
CREAT/UREA NIT SERPL: 19
EOSINOPHIL # BLD AUTO: 0.21 X10(3)/MCL (ref 0–0.9)
EOSINOPHIL NFR BLD AUTO: 2.8 %
ERYTHROCYTE [DISTWIDTH] IN BLOOD BY AUTOMATED COUNT: 14.7 % (ref 11.5–17)
GFR SERPLBLD CREATININE-BSD FMLA CKD-EPI: 60 ML/MIN/1.73/M2
GLOBULIN SER-MCNC: 3.1 GM/DL (ref 2.4–3.5)
GLUCOSE SERPL-MCNC: 172 MG/DL (ref 82–115)
HCT VFR BLD AUTO: 35.2 % (ref 37–47)
HGB BLD-MCNC: 11 G/DL (ref 12–16)
IMM GRANULOCYTES # BLD AUTO: 0.03 X10(3)/MCL (ref 0–0.04)
IMM GRANULOCYTES NFR BLD AUTO: 0.4 %
LIPASE SERPL-CCNC: 14 U/L
LYMPHOCYTES # BLD AUTO: 1.17 X10(3)/MCL (ref 0.6–4.6)
LYMPHOCYTES NFR BLD AUTO: 15.4 %
MCH RBC QN AUTO: 25.9 PG (ref 27–31)
MCHC RBC AUTO-ENTMCNC: 31.3 G/DL (ref 33–36)
MCV RBC AUTO: 83 FL (ref 80–94)
MONOCYTES # BLD AUTO: 0.77 X10(3)/MCL (ref 0.1–1.3)
MONOCYTES NFR BLD AUTO: 10.1 %
NEUTROPHILS # BLD AUTO: 5.43 X10(3)/MCL (ref 2.1–9.2)
NEUTROPHILS NFR BLD AUTO: 71.2 %
PLATELET # BLD AUTO: 270 X10(3)/MCL (ref 130–400)
PMV BLD AUTO: 10.9 FL (ref 7.4–10.4)
POTASSIUM SERPL-SCNC: 3.5 MMOL/L (ref 3.5–5.1)
PROT SERPL-MCNC: 6.2 GM/DL (ref 5.8–7.6)
RBC # BLD AUTO: 4.24 X10(6)/MCL (ref 4.2–5.4)
SODIUM SERPL-SCNC: 139 MMOL/L (ref 136–145)
WBC # BLD AUTO: 7.62 X10(3)/MCL (ref 4.5–11.5)

## 2024-10-24 PROCEDURE — 25500020 PHARM REV CODE 255: Performed by: EMERGENCY MEDICINE

## 2024-10-24 PROCEDURE — 25000003 PHARM REV CODE 250: Performed by: EMERGENCY MEDICINE

## 2024-10-24 PROCEDURE — 83690 ASSAY OF LIPASE: CPT | Performed by: EMERGENCY MEDICINE

## 2024-10-24 PROCEDURE — 80053 COMPREHEN METABOLIC PANEL: CPT | Performed by: EMERGENCY MEDICINE

## 2024-10-24 PROCEDURE — 99285 EMERGENCY DEPT VISIT HI MDM: CPT | Mod: 25

## 2024-10-24 PROCEDURE — 63600175 PHARM REV CODE 636 W HCPCS: Performed by: EMERGENCY MEDICINE

## 2024-10-24 PROCEDURE — 96374 THER/PROPH/DIAG INJ IV PUSH: CPT

## 2024-10-24 PROCEDURE — 85025 COMPLETE CBC W/AUTO DIFF WBC: CPT | Performed by: EMERGENCY MEDICINE

## 2024-10-24 RX ORDER — DIPHENHYDRAMINE HYDROCHLORIDE 50 MG/ML
25 INJECTION INTRAMUSCULAR; INTRAVENOUS
Status: COMPLETED | OUTPATIENT
Start: 2024-10-24 | End: 2024-10-24

## 2024-10-24 RX ORDER — ACETAMINOPHEN 500 MG
1000 TABLET ORAL
Status: COMPLETED | OUTPATIENT
Start: 2024-10-24 | End: 2024-10-24

## 2024-10-24 RX ORDER — IOPAMIDOL 755 MG/ML
100 INJECTION, SOLUTION INTRAVASCULAR
Status: COMPLETED | OUTPATIENT
Start: 2024-10-24 | End: 2024-10-24

## 2024-10-24 RX ADMIN — ACETAMINOPHEN 1000 MG: 500 TABLET, FILM COATED ORAL at 11:10

## 2024-10-24 RX ADMIN — IOPAMIDOL 100 ML: 755 INJECTION, SOLUTION INTRAVENOUS at 11:10

## 2024-10-24 RX ADMIN — DIPHENHYDRAMINE HYDROCHLORIDE 25 MG: 50 INJECTION, SOLUTION INTRAMUSCULAR; INTRAVENOUS at 11:10

## 2024-10-25 VITALS
RESPIRATION RATE: 16 BRPM | OXYGEN SATURATION: 95 % | HEART RATE: 70 BPM | DIASTOLIC BLOOD PRESSURE: 58 MMHG | TEMPERATURE: 99 F | WEIGHT: 187 LBS | SYSTOLIC BLOOD PRESSURE: 152 MMHG | BODY MASS INDEX: 32.1 KG/M2

## 2024-10-25 LAB
BACTERIA #/AREA URNS AUTO: ABNORMAL /HPF
BILIRUB UR QL STRIP.AUTO: NEGATIVE
CLARITY UR: CLEAR
COLOR UR AUTO: YELLOW
GLUCOSE UR QL STRIP: NEGATIVE
HGB UR QL STRIP: NEGATIVE
KETONES UR QL STRIP: NEGATIVE
LEUKOCYTE ESTERASE UR QL STRIP: ABNORMAL
NITRITE UR QL STRIP: NEGATIVE
PH UR STRIP: 6 [PH]
PROT UR QL STRIP: NEGATIVE
RBC #/AREA URNS AUTO: ABNORMAL /HPF
SP GR UR STRIP.AUTO: 1.01 (ref 1–1.03)
SQUAMOUS #/AREA URNS AUTO: ABNORMAL /HPF
UROBILINOGEN UR STRIP-ACNC: 1
WBC #/AREA URNS AUTO: ABNORMAL /HPF

## 2024-10-25 PROCEDURE — 63600175 PHARM REV CODE 636 W HCPCS: Performed by: EMERGENCY MEDICINE

## 2024-10-25 PROCEDURE — 96375 TX/PRO/DX INJ NEW DRUG ADDON: CPT

## 2024-10-25 PROCEDURE — 87086 URINE CULTURE/COLONY COUNT: CPT | Performed by: EMERGENCY MEDICINE

## 2024-10-25 PROCEDURE — 81003 URINALYSIS AUTO W/O SCOPE: CPT | Performed by: EMERGENCY MEDICINE

## 2024-10-25 PROCEDURE — 87186 SC STD MICRODIL/AGAR DIL: CPT | Performed by: EMERGENCY MEDICINE

## 2024-10-25 PROCEDURE — 87077 CULTURE AEROBIC IDENTIFY: CPT | Performed by: EMERGENCY MEDICINE

## 2024-10-25 RX ORDER — CEFUROXIME AXETIL 500 MG/1
500 TABLET ORAL 2 TIMES DAILY
Qty: 14 TABLET | Refills: 0 | Status: SHIPPED | OUTPATIENT
Start: 2024-10-25 | End: 2024-11-01

## 2024-10-25 RX ORDER — CEFTRIAXONE 1 G/1
1 INJECTION, POWDER, FOR SOLUTION INTRAMUSCULAR; INTRAVENOUS
Status: COMPLETED | OUTPATIENT
Start: 2024-10-25 | End: 2024-10-25

## 2024-10-25 RX ADMIN — CEFTRIAXONE SODIUM 1 G: 1 INJECTION, POWDER, FOR SOLUTION INTRAMUSCULAR; INTRAVENOUS at 02:10

## 2024-10-27 LAB — BACTERIA UR CULT: ABNORMAL

## 2024-12-17 ENCOUNTER — HOSPITAL ENCOUNTER (INPATIENT)
Facility: HOSPITAL | Age: 89
LOS: 3 days | Discharge: HOME | DRG: 683 | End: 2024-12-20
Attending: INTERNAL MEDICINE | Admitting: INTERNAL MEDICINE
Payer: MEDICARE

## 2024-12-17 DIAGNOSIS — E87.6 HYPOKALEMIA: ICD-10-CM

## 2024-12-17 DIAGNOSIS — E86.0 DEHYDRATION: Primary | ICD-10-CM

## 2024-12-17 DIAGNOSIS — R11.2 NAUSEA AND VOMITING, UNSPECIFIED VOMITING TYPE: ICD-10-CM
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
## 2024-12-17 DIAGNOSIS — R19.7 DIARRHEA, UNSPECIFIED TYPE: ICD-10-CM

## 2024-12-17 DIAGNOSIS — N17.9 AKI (ACUTE KIDNEY INJURY): ICD-10-CM

## 2024-12-17 PROBLEM — R55 SYNCOPE: Status: ACTIVE | Noted: 2024-12-17

## 2024-12-17 LAB
ALBUMIN SERPL-MCNC: 3.4 G/DL (ref 3.4–4.8)
ALBUMIN/GLOB SERPL: 1.1 RATIO (ref 1.1–2)
ALP SERPL-CCNC: 54 UNIT/L (ref 40–150)
ALT SERPL-CCNC: 23 UNIT/L (ref 0–55)
ANION GAP SERPL CALC-SCNC: 16 MEQ/L
AST SERPL-CCNC: 22 UNIT/L (ref 5–34)
BASOPHILS # BLD AUTO: 0.02 X10(3)/MCL
BASOPHILS NFR BLD AUTO: 0.2 %
BILIRUB SERPL-MCNC: 0.6 MG/DL
BUN SERPL-MCNC: 49 MG/DL (ref 9.8–20.1)
CALCIUM SERPL-MCNC: 9 MG/DL (ref 8.4–10.2)
CHLORIDE SERPL-SCNC: 106 MMOL/L (ref 98–107)
CO2 SERPL-SCNC: 15 MMOL/L (ref 23–31)
CREAT SERPL-MCNC: 3.49 MG/DL (ref 0.55–1.02)
CREAT/UREA NIT SERPL: 14
EOSINOPHIL # BLD AUTO: 0.03 X10(3)/MCL (ref 0–0.9)
EOSINOPHIL NFR BLD AUTO: 0.4 %
ERYTHROCYTE [DISTWIDTH] IN BLOOD BY AUTOMATED COUNT: 15.1 % (ref 11.5–17)
FLUAV AG UPPER RESP QL IA.RAPID: NOT DETECTED
FLUBV AG UPPER RESP QL IA.RAPID: NOT DETECTED
GFR SERPLBLD CREATININE-BSD FMLA CKD-EPI: 12 ML/MIN/1.73/M2
GLOBULIN SER-MCNC: 3 GM/DL (ref 2.4–3.5)
GLUCOSE SERPL-MCNC: 185 MG/DL (ref 82–115)
HCT VFR BLD AUTO: 38.1 % (ref 37–47)
HGB BLD-MCNC: 12.4 G/DL (ref 12–16)
IMM GRANULOCYTES # BLD AUTO: 0.04 X10(3)/MCL (ref 0–0.04)
IMM GRANULOCYTES NFR BLD AUTO: 0.5 %
LACTATE SERPL-SCNC: 1.3 MMOL/L (ref 0.5–2.2)
LIPASE SERPL-CCNC: 32 U/L
LYMPHOCYTES # BLD AUTO: 1.09 X10(3)/MCL (ref 0.6–4.6)
LYMPHOCYTES NFR BLD AUTO: 13.1 %
MAGNESIUM SERPL-MCNC: 1.7 MG/DL (ref 1.6–2.6)
MCH RBC QN AUTO: 26.5 PG (ref 27–31)
MCHC RBC AUTO-ENTMCNC: 32.5 G/DL (ref 33–36)
MCV RBC AUTO: 81.4 FL (ref 80–94)
MONOCYTES # BLD AUTO: 1.05 X10(3)/MCL (ref 0.1–1.3)
MONOCYTES NFR BLD AUTO: 12.7 %
NEUTROPHILS # BLD AUTO: 6.06 X10(3)/MCL (ref 2.1–9.2)
NEUTROPHILS NFR BLD AUTO: 73.1 %
PLATELET # BLD AUTO: 291 X10(3)/MCL (ref 130–400)
PMV BLD AUTO: 11.1 FL (ref 7.4–10.4)
POTASSIUM SERPL-SCNC: 3.3 MMOL/L (ref 3.5–5.1)
PROT SERPL-MCNC: 6.4 GM/DL (ref 5.8–7.6)
RBC # BLD AUTO: 4.68 X10(6)/MCL (ref 4.2–5.4)
RSV A 5' UTR RNA NPH QL NAA+PROBE: NOT DETECTED
SARS-COV-2 RNA RESP QL NAA+PROBE: NOT DETECTED
SODIUM SERPL-SCNC: 137 MMOL/L (ref 136–145)
TROPONIN I SERPL-MCNC: 0.02 NG/ML (ref 0–0.04)
TSH SERPL-ACNC: 1.45 UIU/ML (ref 0.35–4.94)
WBC # BLD AUTO: 8.29 X10(3)/MCL (ref 4.5–11.5)

## 2024-12-17 PROCEDURE — 99285 EMERGENCY DEPT VISIT HI MDM: CPT | Mod: 25

## 2024-12-17 PROCEDURE — 83605 ASSAY OF LACTIC ACID: CPT | Performed by: INTERNAL MEDICINE

## 2024-12-17 PROCEDURE — 11000001 HC ACUTE MED/SURG PRIVATE ROOM

## 2024-12-17 PROCEDURE — 83690 ASSAY OF LIPASE: CPT | Performed by: INTERNAL MEDICINE

## 2024-12-17 PROCEDURE — 96375 TX/PRO/DX INJ NEW DRUG ADDON: CPT

## 2024-12-17 PROCEDURE — 84443 ASSAY THYROID STIM HORMONE: CPT | Performed by: INTERNAL MEDICINE

## 2024-12-17 PROCEDURE — 0241U COVID/RSV/FLU A&B PCR: CPT | Performed by: INTERNAL MEDICINE

## 2024-12-17 PROCEDURE — 93005 ELECTROCARDIOGRAM TRACING: CPT

## 2024-12-17 PROCEDURE — 21400001 HC TELEMETRY ROOM

## 2024-12-17 PROCEDURE — 84484 ASSAY OF TROPONIN QUANT: CPT | Performed by: INTERNAL MEDICINE

## 2024-12-17 PROCEDURE — 96365 THER/PROPH/DIAG IV INF INIT: CPT

## 2024-12-17 PROCEDURE — 96361 HYDRATE IV INFUSION ADD-ON: CPT

## 2024-12-17 PROCEDURE — 63600175 PHARM REV CODE 636 W HCPCS: Performed by: INTERNAL MEDICINE

## 2024-12-17 PROCEDURE — 80053 COMPREHEN METABOLIC PANEL: CPT | Performed by: INTERNAL MEDICINE

## 2024-12-17 PROCEDURE — 83735 ASSAY OF MAGNESIUM: CPT | Performed by: INTERNAL MEDICINE

## 2024-12-17 PROCEDURE — 85025 COMPLETE CBC W/AUTO DIFF WBC: CPT | Performed by: INTERNAL MEDICINE

## 2024-12-17 PROCEDURE — 93010 ELECTROCARDIOGRAM REPORT: CPT | Mod: ,,, | Performed by: INTERNAL MEDICINE

## 2024-12-17 PROCEDURE — 25000003 PHARM REV CODE 250: Performed by: INTERNAL MEDICINE

## 2024-12-17 RX ORDER — LIDOCAINE 50 MG/G
1 PATCH TOPICAL
Status: COMPLETED | OUTPATIENT
Start: 2024-12-17 | End: 2024-12-18

## 2024-12-17 RX ORDER — PROCHLORPERAZINE EDISYLATE 5 MG/ML
5 INJECTION INTRAMUSCULAR; INTRAVENOUS EVERY 6 HOURS PRN
Status: DISCONTINUED | OUTPATIENT
Start: 2024-12-18 | End: 2024-12-18

## 2024-12-17 RX ORDER — SODIUM CHLORIDE 0.9 % (FLUSH) 0.9 %
10 SYRINGE (ML) INJECTION
Status: DISCONTINUED | OUTPATIENT
Start: 2024-12-18 | End: 2024-12-20 | Stop reason: HOSPADM

## 2024-12-17 RX ORDER — MAGNESIUM SULFATE HEPTAHYDRATE 40 MG/ML
2 INJECTION, SOLUTION INTRAVENOUS
Status: COMPLETED | OUTPATIENT
Start: 2024-12-17 | End: 2024-12-18

## 2024-12-17 RX ORDER — TALC
6 POWDER (GRAM) TOPICAL NIGHTLY PRN
Status: DISCONTINUED | OUTPATIENT
Start: 2024-12-18 | End: 2024-12-20 | Stop reason: HOSPADM

## 2024-12-17 RX ORDER — ENOXAPARIN SODIUM 100 MG/ML
30 INJECTION SUBCUTANEOUS EVERY 24 HOURS
Status: DISCONTINUED | OUTPATIENT
Start: 2024-12-18 | End: 2024-12-18

## 2024-12-17 RX ORDER — DULAGLUTIDE 0.75 MG/.5ML
0.75 INJECTION, SOLUTION SUBCUTANEOUS
COMMUNITY
Start: 2024-11-29

## 2024-12-17 RX ORDER — ONDANSETRON HYDROCHLORIDE 2 MG/ML
4 INJECTION, SOLUTION INTRAVENOUS EVERY 8 HOURS PRN
Status: DISCONTINUED | OUTPATIENT
Start: 2024-12-18 | End: 2024-12-20 | Stop reason: HOSPADM

## 2024-12-17 RX ORDER — INSULIN GLARGINE 100 [IU]/ML
10 INJECTION, SOLUTION SUBCUTANEOUS NIGHTLY
COMMUNITY

## 2024-12-17 RX ORDER — ONDANSETRON HYDROCHLORIDE 2 MG/ML
4 INJECTION, SOLUTION INTRAVENOUS
Status: COMPLETED | OUTPATIENT
Start: 2024-12-17 | End: 2024-12-17

## 2024-12-17 RX ORDER — LISINOPRIL 10 MG/1
10 TABLET ORAL DAILY
COMMUNITY

## 2024-12-17 RX ORDER — FAMOTIDINE 20 MG/1
20 TABLET, FILM COATED ORAL NIGHTLY
Status: ON HOLD | COMMUNITY
End: 2024-12-20 | Stop reason: HOSPADM

## 2024-12-17 RX ORDER — ACETAMINOPHEN 325 MG/1
650 TABLET ORAL EVERY 8 HOURS PRN
Status: DISCONTINUED | OUTPATIENT
Start: 2024-12-18 | End: 2024-12-20 | Stop reason: HOSPADM

## 2024-12-17 RX ORDER — HUMAN INSULIN 100 [IU]/ML
INJECTION, SOLUTION SUBCUTANEOUS
COMMUNITY
Start: 2024-07-22

## 2024-12-17 RX ORDER — ACETAMINOPHEN 500 MG
2000 TABLET ORAL DAILY
COMMUNITY

## 2024-12-17 RX ORDER — SODIUM CHLORIDE 9 MG/ML
1000 INJECTION, SOLUTION INTRAVENOUS
Status: COMPLETED | OUTPATIENT
Start: 2024-12-18 | End: 2024-12-18

## 2024-12-17 RX ORDER — PANTOPRAZOLE SODIUM 40 MG/1
40 TABLET, DELAYED RELEASE ORAL DAILY
COMMUNITY

## 2024-12-17 RX ORDER — POTASSIUM CHLORIDE 7.45 MG/ML
10 INJECTION INTRAVENOUS
Status: COMPLETED | OUTPATIENT
Start: 2024-12-17 | End: 2024-12-18

## 2024-12-17 RX ORDER — MORPHINE SULFATE 4 MG/ML
4 INJECTION, SOLUTION INTRAMUSCULAR; INTRAVENOUS EVERY 4 HOURS PRN
Status: DISCONTINUED | OUTPATIENT
Start: 2024-12-18 | End: 2024-12-18

## 2024-12-17 RX ADMIN — MAGNESIUM SULFATE HEPTAHYDRATE 2 G: 40 INJECTION, SOLUTION INTRAVENOUS at 11:12

## 2024-12-17 RX ADMIN — ONDANSETRON 4 MG: 2 INJECTION INTRAMUSCULAR; INTRAVENOUS at 09:12

## 2024-12-17 RX ADMIN — LIDOCAINE 5% 1 PATCH: 700 PATCH TOPICAL at 09:12

## 2024-12-17 RX ADMIN — SODIUM CHLORIDE 500 ML: 9 INJECTION, SOLUTION INTRAVENOUS at 09:12

## 2024-12-17 RX ADMIN — POTASSIUM CHLORIDE 10 MEQ: 7.46 INJECTION, SOLUTION INTRAVENOUS at 10:12

## 2024-12-18 LAB
ANION GAP SERPL CALC-SCNC: 10 MEQ/L
BASOPHILS # BLD AUTO: 0.04 X10(3)/MCL
BASOPHILS NFR BLD AUTO: 0.7 %
BUN SERPL-MCNC: 46 MG/DL (ref 9.8–20.1)
CALCIUM SERPL-MCNC: 8.8 MG/DL (ref 8.4–10.2)
CHLORIDE SERPL-SCNC: 111 MMOL/L (ref 98–107)
CO2 SERPL-SCNC: 17 MMOL/L (ref 23–31)
CREAT SERPL-MCNC: 2.55 MG/DL (ref 0.55–1.02)
CREAT/UREA NIT SERPL: 18
EOSINOPHIL # BLD AUTO: 0.07 X10(3)/MCL (ref 0–0.9)
EOSINOPHIL NFR BLD AUTO: 1.2 %
ERYTHROCYTE [DISTWIDTH] IN BLOOD BY AUTOMATED COUNT: 15.2 % (ref 11.5–17)
GFR SERPLBLD CREATININE-BSD FMLA CKD-EPI: 18 ML/MIN/1.73/M2
GLUCOSE SERPL-MCNC: 123 MG/DL (ref 82–115)
HCT VFR BLD AUTO: 36.8 % (ref 37–47)
HGB BLD-MCNC: 11.9 G/DL (ref 12–16)
IMM GRANULOCYTES # BLD AUTO: 0.01 X10(3)/MCL (ref 0–0.04)
IMM GRANULOCYTES NFR BLD AUTO: 0.2 %
LYMPHOCYTES # BLD AUTO: 1.2 X10(3)/MCL (ref 0.6–4.6)
LYMPHOCYTES NFR BLD AUTO: 21.4 %
MCH RBC QN AUTO: 26.8 PG (ref 27–31)
MCHC RBC AUTO-ENTMCNC: 32.3 G/DL (ref 33–36)
MCV RBC AUTO: 82.9 FL (ref 80–94)
MONOCYTES # BLD AUTO: 0.99 X10(3)/MCL (ref 0.1–1.3)
MONOCYTES NFR BLD AUTO: 17.6 %
NEUTROPHILS # BLD AUTO: 3.31 X10(3)/MCL (ref 2.1–9.2)
NEUTROPHILS NFR BLD AUTO: 58.9 %
PLATELET # BLD AUTO: 265 X10(3)/MCL (ref 130–400)
PMV BLD AUTO: 11.3 FL (ref 7.4–10.4)
POTASSIUM SERPL-SCNC: 3.2 MMOL/L (ref 3.5–5.1)
RBC # BLD AUTO: 4.44 X10(6)/MCL (ref 4.2–5.4)
SODIUM SERPL-SCNC: 138 MMOL/L (ref 136–145)
WBC # BLD AUTO: 5.62 X10(3)/MCL (ref 4.5–11.5)

## 2024-12-18 PROCEDURE — 94761 N-INVAS EAR/PLS OXIMETRY MLT: CPT

## 2024-12-18 PROCEDURE — 36415 COLL VENOUS BLD VENIPUNCTURE: CPT | Performed by: EMERGENCY MEDICINE

## 2024-12-18 PROCEDURE — 85025 COMPLETE CBC W/AUTO DIFF WBC: CPT | Performed by: EMERGENCY MEDICINE

## 2024-12-18 PROCEDURE — 63600175 PHARM REV CODE 636 W HCPCS: Performed by: EMERGENCY MEDICINE

## 2024-12-18 PROCEDURE — 21400001 HC TELEMETRY ROOM

## 2024-12-18 PROCEDURE — 97161 PT EVAL LOW COMPLEX 20 MIN: CPT

## 2024-12-18 PROCEDURE — 99900035 HC TECH TIME PER 15 MIN (STAT)

## 2024-12-18 PROCEDURE — 25000003 PHARM REV CODE 250: Performed by: EMERGENCY MEDICINE

## 2024-12-18 PROCEDURE — 63600175 PHARM REV CODE 636 W HCPCS: Performed by: INTERNAL MEDICINE

## 2024-12-18 PROCEDURE — 80048 BASIC METABOLIC PNL TOTAL CA: CPT | Performed by: EMERGENCY MEDICINE

## 2024-12-18 PROCEDURE — 25000003 PHARM REV CODE 250: Performed by: INTERNAL MEDICINE

## 2024-12-18 RX ORDER — GLUCAGON 1 MG
1 KIT INJECTION
Status: DISCONTINUED | OUTPATIENT
Start: 2024-12-18 | End: 2024-12-20 | Stop reason: HOSPADM

## 2024-12-18 RX ORDER — CARVEDILOL 12.5 MG/1
12.5 TABLET ORAL 2 TIMES DAILY
Status: DISCONTINUED | OUTPATIENT
Start: 2024-12-18 | End: 2024-12-20 | Stop reason: HOSPADM

## 2024-12-18 RX ORDER — LOPERAMIDE HYDROCHLORIDE 2 MG/1
2 CAPSULE ORAL 4 TIMES DAILY PRN
Status: DISCONTINUED | OUTPATIENT
Start: 2024-12-18 | End: 2024-12-20 | Stop reason: HOSPADM

## 2024-12-18 RX ORDER — BUDESONIDE 3 MG/1
3 CAPSULE, COATED PELLETS ORAL DAILY
Status: DISCONTINUED | OUTPATIENT
Start: 2024-12-18 | End: 2024-12-19

## 2024-12-18 RX ORDER — FAMOTIDINE 20 MG/1
20 TABLET, FILM COATED ORAL NIGHTLY
Status: DISCONTINUED | OUTPATIENT
Start: 2024-12-18 | End: 2024-12-20 | Stop reason: HOSPADM

## 2024-12-18 RX ORDER — FENOFIBRATE 145 MG/1
145 TABLET, FILM COATED ORAL DAILY
Status: DISCONTINUED | OUTPATIENT
Start: 2024-12-18 | End: 2024-12-18

## 2024-12-18 RX ORDER — INSULIN GLARGINE 100 [IU]/ML
10 INJECTION, SOLUTION SUBCUTANEOUS NIGHTLY
Status: DISCONTINUED | OUTPATIENT
Start: 2024-12-18 | End: 2024-12-20 | Stop reason: HOSPADM

## 2024-12-18 RX ORDER — SODIUM CHLORIDE 9 MG/ML
INJECTION, SOLUTION INTRAVENOUS CONTINUOUS
Status: DISCONTINUED | OUTPATIENT
Start: 2024-12-18 | End: 2024-12-19

## 2024-12-18 RX ORDER — DONEPEZIL HYDROCHLORIDE 5 MG/1
5 TABLET, FILM COATED ORAL NIGHTLY
Status: DISCONTINUED | OUTPATIENT
Start: 2024-12-18 | End: 2024-12-20 | Stop reason: HOSPADM

## 2024-12-18 RX ORDER — IBUPROFEN 200 MG
24 TABLET ORAL
Status: DISCONTINUED | OUTPATIENT
Start: 2024-12-18 | End: 2024-12-20 | Stop reason: HOSPADM

## 2024-12-18 RX ORDER — INSULIN ASPART 100 [IU]/ML
0-10 INJECTION, SOLUTION INTRAVENOUS; SUBCUTANEOUS
Status: DISCONTINUED | OUTPATIENT
Start: 2024-12-18 | End: 2024-12-20 | Stop reason: HOSPADM

## 2024-12-18 RX ORDER — MICONAZOLE NITRATE 2 G/100G
POWDER TOPICAL 2 TIMES DAILY
Status: DISCONTINUED | OUTPATIENT
Start: 2024-12-18 | End: 2024-12-20 | Stop reason: HOSPADM

## 2024-12-18 RX ORDER — IBUPROFEN 200 MG
16 TABLET ORAL
Status: DISCONTINUED | OUTPATIENT
Start: 2024-12-18 | End: 2024-12-20 | Stop reason: HOSPADM

## 2024-12-18 RX ORDER — SULFASALAZINE 500 MG/1
500 TABLET ORAL 2 TIMES DAILY
Status: DISCONTINUED | OUTPATIENT
Start: 2024-12-18 | End: 2024-12-20 | Stop reason: HOSPADM

## 2024-12-18 RX ORDER — SERTRALINE HYDROCHLORIDE 50 MG/1
100 TABLET, FILM COATED ORAL DAILY
Status: DISCONTINUED | OUTPATIENT
Start: 2024-12-18 | End: 2024-12-20 | Stop reason: HOSPADM

## 2024-12-18 RX ORDER — SULFASALAZINE 500 MG/1
500 TABLET, DELAYED RELEASE ORAL 2 TIMES DAILY
Status: DISCONTINUED | OUTPATIENT
Start: 2024-12-18 | End: 2024-12-18

## 2024-12-18 RX ORDER — SIMETHICONE 125 MG
125 TABLET,CHEWABLE ORAL EVERY 4 HOURS PRN
COMMUNITY

## 2024-12-18 RX ORDER — PANTOPRAZOLE SODIUM 40 MG/1
40 TABLET, DELAYED RELEASE ORAL DAILY
Status: DISCONTINUED | OUTPATIENT
Start: 2024-12-18 | End: 2024-12-20 | Stop reason: HOSPADM

## 2024-12-18 RX ADMIN — SERTRALINE HYDROCHLORIDE 100 MG: 50 TABLET ORAL at 09:12

## 2024-12-18 RX ADMIN — FAMOTIDINE 20 MG: 20 TABLET ORAL at 06:12

## 2024-12-18 RX ADMIN — CARVEDILOL 12.5 MG: 12.5 TABLET, FILM COATED ORAL at 09:12

## 2024-12-18 RX ADMIN — SODIUM CHLORIDE: 9 INJECTION, SOLUTION INTRAVENOUS at 06:12

## 2024-12-18 RX ADMIN — MICONAZOLE NITRATE: 2 POWDER TOPICAL at 09:12

## 2024-12-18 RX ADMIN — SODIUM CHLORIDE 1000 ML: 9 INJECTION, SOLUTION INTRAVENOUS at 12:12

## 2024-12-18 RX ADMIN — SODIUM CHLORIDE: 9 INJECTION, SOLUTION INTRAVENOUS at 10:12

## 2024-12-18 RX ADMIN — DONEPEZIL HYDROCHLORIDE 5 MG: 5 TABLET ORAL at 09:12

## 2024-12-18 RX ADMIN — SULFASALAZINE 500 MG: 500 TABLET ORAL at 09:12

## 2024-12-18 RX ADMIN — FAMOTIDINE 20 MG: 20 TABLET ORAL at 09:12

## 2024-12-18 RX ADMIN — DONEPEZIL HYDROCHLORIDE 5 MG: 5 TABLET ORAL at 06:12

## 2024-12-18 RX ADMIN — SODIUM CHLORIDE: 9 INJECTION, SOLUTION INTRAVENOUS at 11:12

## 2024-12-18 RX ADMIN — PANTOPRAZOLE SODIUM 40 MG: 40 TABLET, DELAYED RELEASE ORAL at 02:12

## 2024-12-18 RX ADMIN — ONDANSETRON 4 MG: 2 INJECTION INTRAMUSCULAR; INTRAVENOUS at 02:12

## 2024-12-18 RX ADMIN — INSULIN ASPART 2 UNITS: 100 INJECTION, SOLUTION INTRAVENOUS; SUBCUTANEOUS at 12:12

## 2024-12-18 RX ADMIN — INSULIN GLARGINE 10 UNITS: 100 INJECTION, SOLUTION SUBCUTANEOUS at 09:12

## 2024-12-18 NOTE — ED PROVIDER NOTES
Encounter Date: 12/17/2024       History     Chief Complaint   Patient presents with    Dizziness     Pt arrives via [facility] from [facility] with c/o N/V/D x 1 day with pain between shoulder blades. Also unwitnessed fall pta due to dizziness while ambulating to bathroom. + Blood thinners. Unknown LOC. Pt's normal GCS 14.     89-year-old female presenting via EMS from nursing home with nausea, vomiting and diarrhea and dizziness.  EMS reports that patient was having diarrhea for 1 day, had an episode of vomiting today.  She stood up to go to the bathroom and became dizzy and fell.  There was no actual syncope, no head injury.  Patient was complaining of dizziness but states it is improved.  She admits to generalized abdominal discomfort but no pain.  Denies any obvious known sick contacts.  She does admit to pain between her shoulder blades which is chronic and unchanged.  She denies fevers, chills, chest pain, shortness of breath, abdominal pain, constipation, dysuria, hematuria, hematochezia, melena, headache, visual changes, neck pain, back pain, cough, rhinorrhea, sore throat, numbness/tingling, focal deficits    The history is provided by the patient and the EMS personnel.     Review of patient's allergies indicates:   Allergen Reactions    Centratex [iron-folic acid-mv, min cmb#15]     Cephalexin     Erythromycin     Hydroxyzine     Iodinated contrast media     Iodine     Naldecon dx     Penicillins Other (See Comments)     unknown    Tetanus vaccines and toxoid     Vioxx [rofecoxib]      Past Medical History:   Diagnosis Date    Anemia, unspecified     CAD (coronary artery disease)     Crohn disease     DM (diabetes mellitus)     History of coronary angioplasty with insertion of stent     HLD (hyperlipidemia)     HTN (hypertension)     Memory loss      History reviewed. No pertinent surgical history.  No family history on file.  Social History     Tobacco Use    Smoking status: Never    Smokeless tobacco: Never    Substance Use Topics    Alcohol use: Never    Drug use: Never     Review of Systems   All other systems reviewed and are negative.      Physical Exam     Initial Vitals [12/17/24 2058]   BP Pulse Resp Temp SpO2   107/64 69 20 97 °F (36.1 °C) 99 %      MAP       --         Physical Exam    Constitutional: She appears well-developed and well-nourished. She is not diaphoretic. She is cooperative.  Non-toxic appearance. She appears ill. No distress.   HENT:   Head: Normocephalic and atraumatic.   Right Ear: Hearing, tympanic membrane, external ear and ear canal normal.   Left Ear: Hearing, tympanic membrane, external ear and ear canal normal.   Nose: Nose normal. Mouth/Throat: Uvula is midline, oropharynx is clear and moist and mucous membranes are normal.   Eyes: Conjunctivae, EOM and lids are normal. Pupils are equal, round, and reactive to light.   Neck: Trachea normal and phonation normal. Neck supple. No Brudzinski's sign and no Kernig's sign noted. Carotid bruit is not present. No JVD present.    Full passive range of motion without pain.     Cardiovascular:  Normal rate, regular rhythm, normal heart sounds, intact distal pulses and normal pulses.           No murmur heard.  Pulmonary/Chest: No accessory muscle usage. No tachypnea. No respiratory distress. She has no decreased breath sounds. She has no wheezes. She has no rhonchi. She has no rales.   Abdominal: Abdomen is soft. Bowel sounds are normal. She exhibits no distension. There is no abdominal tenderness. No hernia. There is no rebound and no guarding.   Musculoskeletal:      Cervical back: Full passive range of motion without pain and neck supple. No spinous process tenderness or muscular tenderness.      Comments: Skin tear over left hand, no tenderness to palpation, no obvious deformities, range of motion intact, distal pulses intact     Neurological: She is alert and oriented to person, place, and time. She has normal strength and normal reflexes. No  cranial nerve deficit or sensory deficit. She displays a negative Romberg sign. GCS eye subscore is 4. GCS verbal subscore is 5. GCS motor subscore is 6.   Skin: Skin is warm and dry. Capillary refill takes 2 to 3 seconds. Abrasion (Left hand, skin tear) noted. No rash noted.   Psychiatric: She has a normal mood and affect. Her speech is normal and behavior is normal. Thought content normal.         ED Course   Procedures  Labs Reviewed   COMPREHENSIVE METABOLIC PANEL - Abnormal       Result Value    Sodium 137      Potassium 3.3 (*)     Chloride 106      CO2 15 (*)     Glucose 185 (*)     Blood Urea Nitrogen 49.0 (*)     Creatinine 3.49 (*)     Calcium 9.0      Protein Total 6.4      Albumin 3.4      Globulin 3.0      Albumin/Globulin Ratio 1.1      Bilirubin Total 0.6      ALP 54      ALT 23      AST 22      eGFR 12      Anion Gap 16.0      BUN/Creatinine Ratio 14     CBC WITH DIFFERENTIAL - Abnormal    WBC 8.29      RBC 4.68      Hgb 12.4      Hct 38.1      MCV 81.4      MCH 26.5 (*)     MCHC 32.5 (*)     RDW 15.1      Platelet 291      MPV 11.1 (*)     Neut % 73.1      Lymph % 13.1      Mono % 12.7      Eos % 0.4      Basophil % 0.2      Lymph # 1.09      Neut # 6.06      Mono # 1.05      Eos # 0.03      Baso # 0.02      IG# 0.04      IG% 0.5     COVID/RSV/FLU A&B PCR - Normal    Influenza A PCR Not Detected      Influenza B PCR Not Detected      Respiratory Syncytial Virus PCR Not Detected      SARS-CoV-2 PCR Not Detected      Narrative:     The Xpert Xpress SARS-CoV-2/FLU/RSV plus is a rapid, multiplexed real-time PCR test intended for the simultaneous qualitative detection and differentiation of SARS-CoV-2, Influenza A, Influenza B, and respiratory syncytial virus (RSV) viral RNA in either nasopharyngeal swab or nasal swab specimens.         LIPASE - Normal    Lipase Level 32     LACTIC ACID, PLASMA - Normal    Lactic Acid Level 1.3     MAGNESIUM - Normal    Magnesium Level 1.70     TROPONIN I - Normal     Troponin-I 0.024     TSH - Normal    TSH 1.446     CBC W/ AUTO DIFFERENTIAL    Narrative:     The following orders were created for panel order CBC auto differential.  Procedure                               Abnormality         Status                     ---------                               -----------         ------                     CBC with Differential[[id number]]       Abnormal            Final result                 Please view results for these tests on the individual orders.   URINALYSIS, REFLEX TO URINE CULTURE     EKG Readings: (Independently Interpreted)   Ventricular paced rhythm, rate of 69 beats per minute.  No obvious ischemic changes per Sgarbossa criteria.  No significant changes when compared to 08/23/2023       ECG Results              EKG 12-lead (In process)        Collection Time Result Time QRS Duration OHS QTC Calculation    12/17/24 21:12:03 12/17/24 23:35:22 174 527                     In process by Interface, Lab In [facility] (12/17/24 23:35:29)                   Narrative:    Test Reason : R55,    Vent. Rate :  69 BPM     Atrial Rate :  68 BPM     P-R Int :    ms          QRS Dur : 174 ms      QT Int : 492 ms       P-R-T Axes :    -71 105 degrees    QTcB Int : 527 ms    Ventricular-paced rhythm  Abnormal ECG  When compared with ECG of 23-Aug-2023 13:40,  No significant change was found    Referred By: AAAREFERRAL SELF           Confirmed By:                                   Imaging Results              CT Abdomen Pelvis  Without Contrast (Preliminary result)  Result time 12/17/24 22:34:49      Preliminary result by Nikolas Long Jr., MD (12/17/24 22:34:49)                   Narrative:    START OF REPORT:  Technique: CT of the abdomen and pelvis was performed with axial images as well as sagittal and coronal reconstruction images without intravenous contrast.    Comparison: Comparison is with study dated 2024-10-24 23:31:28.    Clinical History: Abdominal pain.    Dosage  Information: Automated Exposure Control was utilized 668.26 mGy.cm.    Findings:  Lines and Tubes: None.  Thorax:  Lungs: Mild linear opacity is present at the visualized lung bases, consistent with scarring and atelectasis. No focal infiltrate or consolidation is seen.  Pleura: No effusions or thickening.  Heart: The heart size is within normal limits. Mild coronary artery calcification is seen. Pacer leads are seen in the visualized heart.  Abdomen:  Abdominal Wall: No abdominal wall pathology is seen.  Liver: A subcentimeter granuloma is seen in the lateral right hepatic lobe. The liver otherwise appears unremarkable.  Biliary System: No intrahepatic or extrahepatic biliary duct dilatation is seen.  Gallbladder: Surgical clips are seen in the gallbladder fossa which may reflect prior cholecystectomy correlate with surgical history and visual inspection.  Pancreas: There is moderate fatty infiltration of the pancreas.  Adrenals: The adrenal glands appear unremarkable.  Kidneys: The kidneys appear unremarkable with no stones cysts masses or hydronephrosis.  Aorta: There is moderate calcification of the.  Bowel:  Esophagus: The visualized esophagus appears unremarkable.  Stomach: The stomach appears unremarkable.  Duodenum: Unremarkable appearing duodenum.  Colon: A few diverticula are seen sigmoid colon. No associated inflammatory stranding is seen to suggest diverticulitis.  Appendix: The appendix appears unremarkable seen on Image 87, Series 3 through Image 88, Series 3.  Peritoneum: No intraperitoneal free air or ascites is seen.    Pelvis:  Bladder: The bladder is nondistended but appears otherwise unremarkable.  Female:  Uterus: The uterus is not identified.  Ovaries: No adnexal masses are seen.    Bony structures:  Dorsal Spine: There is moderate spondylosis of the visualized dorsal spine.  Bony Pelvis: There is mild degenerative change of the bilateral hips.      Impression:  1. No acute intraabdominal or  pelvic solid organ or bowel pathology identified. Details and other findings as discussed above.                                         CT Cervical Spine Without Contrast (Preliminary result)  Result time 12/17/24 22:30:13      Preliminary result by Nikolas Long Jr., MD (12/17/24 22:30:13)                   Narrative:    START OF REPORT:  Technique: CT of the cervical spine was performed without intravenous contrast with axial as well as sagittal and coronal images.    Comparison: None.    Dosage Information: Automated exposure control was utilized.    Clinical history: Fall.    Findings:  Lung apices: The visualized lung apices appear unremarkable.  Spine:  Spinal canal: The spinal canal appears unremarkable.  Mineralization: Mild osteopenia is seen in the visualized bony structures.  Rotation: Mild rotation of C1 with respect to C2 is seen.  Scoliosis: No significant scoliosis is seen.  Vertebral Fusion: Chronic appearing degenerative bony fusion is seen at C3-C4 through C4- C5.  Listhesis: No significant listhesis is identified.  Lordosis: Reversal of the normal cervical lordosis is seen. This may be positional or reflect an element of myospasm.  Intervertebral disc spaces: Multilevel loss of disc height is seen.  Osteophytes: Moderate multilevel endplate osteophytes are seen.  Endplate Sclerosis: Moderate multilevel endplate sclerosis is seen.  Uncovertebral degenerative changes: Moderate multilevel uncovertebral joint arthrosis is seen.  Facet degenerative changes: Moderate multilevel facet degenerative changes are seen.  Fractures: No acute cervical spine fracture dislocation or subluxation is seen.  Orthopedic Hardware: None.    Miscellaneous:  Mastoid air cells: The visualized mastoid air cells appear clear.  Soft Tissues: Unremarkable.      Impression:  1. No acute cervical spine fracture dislocation or subluxation is seen.  2. Degenerative changes and other details as above.                                          CT Head Without Contrast (Preliminary result)  Result time 12/17/24 22:21:44      Preliminary result by Nikolas Long Jr., MD (12/17/24 22:21:44)                   Narrative:    START OF REPORT:  Technique: CT of the head was performed without intravenous contrast with axial as well as coronal and sagittal images.    Comparison: Comparison is with study dated 2024-10-24 23:22:55.    Dosage Information: Automated exposure control was utilized.    Clinical history: Fall.    Findings:  Hemorrhage: No acute intracranial hemorrhage is seen.  CSF spaces: The ventricles, sulci and basal cisterns all appear moderately prominent consistent with global cerebral atrophy.  Brain parenchyma: Mild to moderate stable appearing microvascular change is seen in portions of the periventricular and deep white matter tracts.  Cerebellum: Unremarkable.  Sella and skull base: The sella appears to be within normal limits for age.  Intracranial calcifications: Incidental note is made of bilateral choroid plexus calcification. Incidental note is made of some pineal region calcification. Incidental note is made of some calcification of the falx.  Calvarium: No acute linear or depressed skull fracture is seen.    Maxillofacial Structures:  Paranasal sinuses: The visualized paranasal sinuses appear clear with no significant mucoperiosteal thickening or air fluid levels identified.  Orbits: The orbits appear unremarkable.  Zygomatic arches: The zygomatic arches are intact and unremarkable.  Temporal bones and mastoids: The temporal bones and mastoids appear unremarkable.  TMJ: The mandibular condyles appear normally placed with respect to the mandibular fossa.      Impression:  1. No acute intracranial process identified. Details and findings as noted above.                                         X-Ray Hand 3 View Left (In process)                      X-Ray Pelvis Routine AP (In process)                      X-Ray  Chest 1 View (In process)    Procedure changed from X-Ray Chest PA And Lateral                    Medications   LIDOcaine 5 % patch 1 patch (1 patch Transdermal Patch Applied 12/17/24 2128)   magnesium sulfate 2g in water 50mL IVPB (premix) (2 g Intravenous New Bag 12/17/24 2332)   sodium chloride 0.9% flush 10 mL (has no administration in time range)   melatonin tablet 6 mg (has no administration in time range)   enoxaparin injection 30 mg (has no administration in time range)   acetaminophen tablet 650 mg (has no administration in time range)   morphine injection 4 mg (has no administration in time range)   ondansetron injection 4 mg (has no administration in time range)   prochlorperazine injection Soln 5 mg (has no administration in time range)   0.9% NaCl infusion (has no administration in time range)   sodium chloride 0.9% bolus 500 mL 500 mL (0 mLs Intravenous Stopped 12/17/24 2246)   ondansetron injection 4 mg (4 mg Intravenous Given 12/17/24 2121)   potassium chloride 10 mEq in 100 mL IVPB (10 mEq Intravenous New Bag 12/17/24 2249)     Medical Decision Making  Differential diagnosis includes electrolyte abnormality, dehydration, diverticulitis, influenza, fracture, dislocation, intracranial hemorrhage, subluxation    89-year-old female presenting with nausea, vomiting and diarrhea and dizziness, fall.    Labs show significant dehydration with a creatinine of 3.5, potassium 3.3, magnesium 1.7.  Patient given a total of 1 L normal saline bolus, potassium replaced and she was given magnesium due to intermittent tachyarrhythmias noted on monitor.  EKG showed paced rhythm without any ischemic changes per Sgarbossa criteria.  X-rays are without fracture, dislocation, pneumonia, effusion.  CT of the abdomen and pelvis is without obstruction, colitis or other acute processes but had to be without contrast due to KIM.  CT of head and cervical spine are without fracture or dislocation.  Discussed with patient she is  appropriate for admission due to severe dehydration.  She is agreeable with this.  Case discussed with Dr. Rodriguez, who does accept patient.    Problems Addressed:  KIM (acute kidney injury): acute illness or injury with systemic symptoms  Dehydration: acute illness or injury with systemic symptoms  Diarrhea, unspecified type: acute illness or injury with systemic symptoms  Fall: acute illness or injury  Hypokalemia: acute illness or injury  Nausea and vomiting, unspecified vomiting type: acute illness or injury with systemic symptoms    Amount and/or Complexity of Data Reviewed  Independent Historian: EMS  External Data Reviewed: labs, radiology, ECG and notes.  Labs: ordered. Decision-making details documented in ED Course.  Radiology: ordered and independent interpretation performed. Decision-making details documented in ED Course.  ECG/medicine tests: ordered and independent interpretation performed.     Details: Ventricular paced rhythm, rate of 69 beats per minute.  No obvious ischemic changes per Sgarbossa criteria.  No significant changes when compared to 08/23/2023    Risk  OTC drugs.  Prescription drug management.  Decision regarding hospitalization.                                      Clinical Impression:  Final diagnoses:  [W19.XXXA] Fall  [E86.0] Dehydration (Primary)  [N17.9] KIM (acute kidney injury)  [E87.6] Hypokalemia  [R11.2] Nausea and vomiting, unspecified vomiting type  [R19.7] Diarrhea, unspecified type          ED Disposition Condition    Admit Stable                David Abreu DO  12/17/24 3427

## 2024-12-18 NOTE — HPI
89-year-old female with hx DM, Systolic heart failure, crohn's disease, Parox afib, Dementia resident of NH presenting via EMS from nursing home with nausea, vomiting and diarrhea and dizziness. EMS reports that patient was having diarrhea for 1 day, had an episode of vomiting today. She stood up to go to the bathroom and became dizzy and fell. There was no actual syncope, no head injury. Patient was complaining of dizziness but states it is improved. She admits to generalized abdominal discomfort but no pain. Denies any obvious known sick contacts. She does admit to pain between her shoulder blades which is chronic and unchanged. She denies fevers, chills, chest pain, shortness of breath, abdominal pain, constipation, dysuria, hematuria, , melena, headache, visual changes, neck pain, back pain, cough, rhinorrhea, sore throat, numbness/tingling, focal deficits. She does have a skin tear to her Left hand, small decub to her sacrum also reported had a a little of hemorrhoid bleed when she was assisted to go to the bedside commode.

## 2024-12-18 NOTE — H&P
[facility] - Medical Surgical Unit  [facility] Medicine  History & Physical    Patient Name: Denny Mai  MRN: [id number]  Patient Class: IP- Inpatient  Admission Date: 12/17/2024  Attending Physician: Curtis Rodriguez MD  Primary Care Provider: Asad Maya MD         Patient information was obtained from via telemed    Subjective:     Principal Problem:Dehydration    Chief Complaint:   Chief Complaint   Patient presents with    Dizziness     Pt arrives via [facility] from [facility] with c/o N/V/D x 1 day with pain between shoulder blades. Also unwitnessed fall pta due to dizziness while ambulating to bathroom. + Blood thinners. Unknown LOC. Pt's normal GCS 14.        HPI: 89-year-old female with hx DM, Systolic heart failure, crohn's disease, Parox afib, Dementia resident of NH presenting via EMS from nursing home with nausea, vomiting and diarrhea and dizziness. EMS reports that patient was having diarrhea for 1 day, had an episode of vomiting today. She stood up to go to the bathroom and became dizzy and fell. There was no actual syncope, no head injury. Patient was complaining of dizziness but states it is improved. She admits to generalized abdominal discomfort but no pain. Denies any obvious known sick contacts. She does admit to pain between her shoulder blades which is chronic and unchanged. She denies fevers, chills, chest pain, shortness of breath, abdominal pain, constipation, dysuria, hematuria, , melena, headache, visual changes, neck pain, back pain, cough, rhinorrhea, sore throat, numbness/tingling, focal deficits. She does have a skin tear to her Left hand, small decub to her sacrum also reported had a a little of hemorrhoid bleed when she was assisted to go to the bedside commode.          Past Medical History:   Diagnosis Date    Anemia, unspecified     CAD (coronary artery disease)     Chronic systolic HF (heart failure)     Crohn disease     Dementia     DM (diabetes mellitus)      Hemorrhoids     History of coronary angioplasty with insertion of stent     HLD (hyperlipidemia)     HTN (hypertension)     Memory loss     Paroxysmal atrial fibrillation        Past Surgical History:   Procedure Laterality Date    CHOLECYSTECTOMY      VENTRICULAR CARDIAC PACEMAKER INSERTION         Review of patient's allergies indicates:   Allergen Reactions    Centratex [iron-folic acid-mv, min cmb#15]     Cephalexin     Erythromycin     Hydroxyzine     Iodinated contrast media     Iodine     Naldecon dx     Penicillins Other (See Comments)     unknown    Tetanus vaccines and toxoid     Vioxx [rofecoxib]        No current facility-administered medications on file prior to encounter.     Current Outpatient Medications on File Prior to Encounter   Medication Sig    B-complex with vitamin C (Z-BEC OR EQUIV) tablet Take 1 tablet by mouth once daily.    budesonide (ENTOCORT EC) 3 mg capsule Take 3 mg by mouth once daily.    carvediloL (COREG) 12.5 MG tablet Take 12.5 mg by mouth 2 (two) times a day.    cholecalciferol, vitamin D3, (VITAMIN D3) 50 mcg (2,000 unit) Cap capsule Take 2,000 Units by mouth once daily.    donepeziL (ARICEPT) 5 MG tablet Take 1 tablet (5 mg total) by mouth every evening.    ELIQUIS 5 mg Tab Take 5 mg by mouth 2 (two) times daily.    famotidine (PEPCID) 20 MG tablet Take 20 mg by mouth every evening.    fenofibrate 160 MG Tab Take 160 mg by mouth once daily.    ferrous sulfate (FEOSOL) Tab tablet Take 1 tablet by mouth once daily.    furosemide (LASIX) 20 MG tablet Take 1 tablet (20 mg total) by mouth once daily.    insulin glargine U-100, Lantus, (LANTUS SOLOSTAR U-100 INSULIN) 100 unit/mL (3 mL) InPn pen Inject 10 Units into the skin every evening.    insulin regular 100 unit/mL Inj injection Inject into the skin 4 (four) times daily as needed for High Blood Sugar.    lisinopriL 10 MG tablet Take 10 mg by mouth once daily.    memantine (NAMENDA) 5 MG Tab Take 1 tablet (5 mg total) by mouth 2  "(two) times daily.    mesalamine (PENTASA) 500 MG CR capsule Take 500 mg by mouth 3 (three) times daily.    NOVOLIN R FLEXPEN 100 unit/mL (3 mL) InPn pen     omega-3 fatty acids-fish oil 684-1,200 mg CpDR Take 1 capsule by mouth once daily.    ondansetron (ZOFRAN) 4 MG tablet Take 1 tablet (4 mg total) by mouth every 6 (six) hours. (Patient taking differently: Take 4 mg by mouth every 6 (six) hours as needed for Nausea.)    pantoprazole (PROTONIX) 40 MG tablet Take 40 mg by mouth once daily.    polyethylene glycol (GLYCOLAX) 17 gram PwPk Take 17 g by mouth once daily.    sertraline (ZOLOFT) 100 MG tablet Take 100 mg by mouth once daily.    simethicone (MYLICON) 125 MG chewable tablet Take 125 mg by mouth every 4 (four) hours as needed for Flatulence.    spironolactone (ALDACTONE) 25 MG tablet Take 25 mg by mouth once daily.    TRULICITY 0.75 mg/0.5 mL pen injector Inject 0.75 mg into the skin every 7 days. Every friday    ACCU-CHEK SMARTVIEW TEST STRIP Strp     DROPLET INSULIN SYRINGE 0.3 mL 31 gauge x 5/16" Syrg     insulin asp prt-insulin aspart, NovoLOG 70/30, (NOVOLOG MIX 70-30 U-100 INSULN) 100 unit/mL (70-30) Soln Inject 10 Units into the skin 2 (two) times daily.    quinapriL (ACCUPRIL) 20 MG tablet      Family History    Family history is unknown by patient.       Tobacco Use    Smoking status: Never    Smokeless tobacco: Never   Substance and Sexual Activity    Alcohol use: Never    Drug use: Never    Sexual activity: Not on file     Review of Systems   Constitutional:  Positive for fatigue.   HENT: Negative.     Eyes: Negative.    Respiratory: Negative.     Cardiovascular: Negative.    Gastrointestinal:  Positive for abdominal pain, blood in stool, diarrhea, nausea and vomiting.   Endocrine: Negative.    Genitourinary: Negative.    Musculoskeletal: Negative.    Skin: Negative.    Allergic/Immunologic: Negative.    Neurological:  Positive for dizziness and weakness.   Hematological: Negative.  " "  Psychiatric/Behavioral: Negative.       Objective:     Vital Signs (Most Recent):  Temp: 98 °F (36.7 °C) (12/18/24 0117)  Pulse: 107 (12/18/24 0120)  Resp: 18 (12/18/24 0117)  BP: 100/69 (12/18/24 0120)  SpO2: 98 % (12/18/24 0120) Vital Signs (24h Range):  Temp:  [97 °F (36.1 °C)-98 °F (36.7 °C)] 98 °F (36.7 °C)  Pulse:  [] 107  Resp:  [15-23] 18  SpO2:  [96 %-100 %] 98 %  BP: ()/(50-77) 100/69     Weight: 81.6 kg (180 lb)  Body mass index is 27.37 kg/m².     Physical Exam  Constitutional:       Appearance: Normal appearance. She is normal weight.   HENT:      Head: Normocephalic and atraumatic.      Mouth/Throat:      Mouth: Mucous membranes are dry.      Pharynx: Oropharynx is clear.   Eyes:      Extraocular Movements: Extraocular movements intact.      Conjunctiva/sclera: Conjunctivae normal.      Pupils: Pupils are equal, round, and reactive to light.   Cardiovascular:      Rate and Rhythm: Normal rate and regular rhythm.      Pulses: Normal pulses.      Heart sounds: Normal heart sounds.   Pulmonary:      Effort: Pulmonary effort is normal.      Breath sounds: Normal breath sounds.   Abdominal:      General: Abdomen is flat. Bowel sounds are normal.      Palpations: Abdomen is soft.      Comments: Erythema at abd folds   Musculoskeletal:         General: Normal range of motion.      Cervical back: Normal range of motion and neck supple.   Skin:     General: Skin is warm and dry.      Comments: Rt hand skin tear, and sacral decub   Neurological:      General: No focal deficit present.      Mental Status: She is alert and oriented to person, place, and time. Mental status is at baseline.   Psychiatric:         Mood and Affect: Mood normal.              CRANIAL NERVES     CN III, IV, VI   Pupils are equal, round, and reactive to light.       Significant Labs: All pertinent labs within the past 24 hours have been reviewed.  A1C: No results for input(s): "HGBA1C" in the last 4320 hours.  ABGs: No " "results for input(s): "PH", "PCO2", "HCO3", "POCSATURATED", "BE", "TOTALHB", "COHB", "METHB", "O2HB", "POCFIO2", "PO2" in the last 48 hours.  Bilirubin:   Recent Labs   Lab 12/17/24 2111   BILITOT 0.6     Blood Culture: No results for input(s): "LABBLOO" in the last 48 hours.  BMP:   Recent Labs   Lab 12/17/24 2111      K 3.3*      CO2 15*   BUN 49.0*   CREATININE 3.49*   CALCIUM 9.0   MG 1.70     CBC:   Recent Labs   Lab 12/17/24 2111   WBC 8.29   HGB 12.4   HCT 38.1        CMP:   Recent Labs   Lab 12/17/24 2111      K 3.3*      CO2 15*   BUN 49.0*   CREATININE 3.49*   CALCIUM 9.0   ALBUMIN 3.4   BILITOT 0.6   ALKPHOS 54   AST 22   ALT 23     Cardiac Markers: No results for input(s): "CKMB", "MYOGLOBIN", "BNP", "TROPISTAT" in the last 48 hours.  Coagulation: No results for input(s): "PT", "INR", "APTT" in the last 48 hours.  Lactic Acid:   Recent Labs   Lab 12/17/24 2111   LACTATE 1.3     Lipase:   Recent Labs   Lab 12/17/24 2111   LIPASE 32     Lipid Panel: No results for input(s): "CHOL", "HDL", "LDLCALC", "TRIG", "CHOLHDL" in the last 48 hours.  Magnesium:   Recent Labs   Lab 12/17/24 2111   MG 1.70     POCT Glucose: No results for input(s): "POCTGLUCOSE" in the last 48 hours.  Prealbumin: No results for input(s): "PREALBUMIN" in the last 48 hours.  Respiratory Culture: No results for input(s): "GSRESP", "RESPIRATORYC" in the last 48 hours.  Troponin:   Recent Labs   Lab 12/17/24 2111   TROPONINI 0.024     TSH:   Recent Labs   Lab 12/17/24 2111   TSH 1.446     Urine Culture: No results for input(s): "LABURIN" in the last 48 hours.  Urine Studies: No results for input(s): "COLORU", "APPEARANCEUA", "PHUR", "SPECGRAV", "PROTEINUA", "GLUCUA", "KETONESU", "BILIRUBINUA", "OCCULTUA", "NITRITE", "UROBILINOGEN", "LEUKOCYTESUR", "RBCUA", "WBCUA", "BACTERIA", "SQUAMEPITHEL", "HYALINECASTS" in the last 48 hours.    Invalid input(s): "WRIGHTSUR"    Significant Imaging:   Abd CT  1. No " acute intraabdominal or pelvic solid organ or bowel pathology identified. Details and other findings as discussed above.   Cervical CT  1. No acute cervical spine fracture dislocation or subluxation is seen.  2. Degenerative changes and other details as above  Head CT  No acute finding  Assessment/Plan:   - KIM due to IVVD, hold Nephrotoxins, and hydrate check labs in AM  -N/V/D likely due to acute gastroenteritis will hydrate and supportive care. Antiemetics  -Weakness and dizziness due to above hydrate   -DM accu checks and SSI  - systolic heart failure compensated hold lasix, lisinopril, Aldactone, but continue on Coreg  -Crohn's disease continue on Budesonide  -Skin tear and decub wound care  -Parox afib continue on coreg, hold eliquis due to slight rectal bleed likely from hemorrhoids   - Dementia on Aricept  -Intertrigo add nystatin powder  - DVT ppx on SCD  Pt is Full code, SDM her daughter Lori Bains  Pt is seen and examined by me via telemed. Pt is in [facility]. I am in [place], [place]. Nursing staff assisted with evaluation. Software is Audio/ video  Pt is being admitted as an inpatient to the hospitalist service for further eval and treatment  No notes have been filed under this hospital service.  Service: Hospital Medicine    VTE Risk Mitigation (From admission, onward)           Ordered     IP VTE HIGH RISK PATIENT  Once         12/17/24 2346     Place sequential compression device  Until discontinued         12/17/24 2346                               While attempting to sit on side of bed, patient became dizzy and had near syncopal episode. Orthostatic vitals performed, see attached. Patient dizzy for approximately 1 minute, then able to stand and transfer to bedside commode.    12/18/24 0117 12/18/24 0119 12/18/24 0120   Vital Signs   Temp 98 °F (36.7 °C)  --   --    Temp Source Oral  --   --    Pulse 70 74 107   Resp 18  --   --    SpO2 98 % 98 % 98 %   BP 98/60 106/61 100/69   MAP  (mmHg) 73 76 80   BP Location Left arm Left arm Left arm   BP Method Automatic Automatic Automatic   Patient Position Lying Sitting Standing   Orthostatic VS Yes Yes Yes   Is Patient Symptomatic in this Position? No Yes No   Associated Signs/Symptoms  --  dizziness  (near syncope)  --          Curtis Rodriguez MD  Department of Hospital Medicine  [facility] - Medical Surgical Unit

## 2024-12-18 NOTE — PLAN OF CARE
Problem: Adult Inpatient Plan of Care  Goal: Plan of Care Review  Outcome: Progressing  Goal: Patient-Specific Goal (Individualized)  Outcome: Progressing  Goal: Absence of Hospital-Acquired Illness or Injury  Outcome: Progressing  Goal: Optimal Comfort and Wellbeing  Outcome: Progressing  Goal: Readiness for Transition of Care  Outcome: Progressing     Problem: Acute Kidney Injury/Impairment  Goal: Fluid and Electrolyte Balance  Outcome: Progressing  Goal: Improved Oral Intake  Outcome: Progressing  Goal: Effective Renal Function  Outcome: Progressing     Problem: Wound  Goal: Optimal Coping  Outcome: Progressing  Goal: Optimal Functional Ability  Outcome: Progressing  Goal: Absence of Infection Signs and Symptoms  Outcome: Progressing  Goal: Improved Oral Intake  Outcome: Progressing  Goal: Optimal Pain Control and Function  Outcome: Progressing  Goal: Skin Health and Integrity  Outcome: Progressing  Goal: Optimal Wound Healing  Outcome: Progressing     Problem: Fall Injury Risk  Goal: Absence of Fall and Fall-Related Injury  Outcome: Progressing     Problem: Skin Injury Risk Increased  Goal: Skin Health and Integrity  Outcome: Progressing     Problem: Fluid Volume Deficit  Goal: Fluid Balance  Outcome: Progressing     Problem: Electrolyte Imbalance  Goal: Electrolyte Balance  Outcome: Progressing     Problem: Diarrhea  Goal: Effective Diarrhea Management  Outcome: Progressing     Problem: Nausea and Vomiting  Goal: Nausea and Vomiting Relief  Outcome: Progressing     Problem: Comorbidity Management  Goal: Maintenance of Heart Failure Symptom Control  Outcome: Progressing  Goal: Blood Pressure in Desired Range  Outcome: Progressing

## 2024-12-18 NOTE — SUBJECTIVE & OBJECTIVE
Past Medical History:   Diagnosis Date    Anemia, unspecified     CAD (coronary artery disease)     Chronic systolic HF (heart failure)     Crohn disease     Dementia     DM (diabetes mellitus)     Hemorrhoids     History of coronary angioplasty with insertion of stent     HLD (hyperlipidemia)     HTN (hypertension)     Memory loss     Paroxysmal atrial fibrillation        Past Surgical History:   Procedure Laterality Date    CHOLECYSTECTOMY      VENTRICULAR CARDIAC PACEMAKER INSERTION         Review of patient's allergies indicates:   Allergen Reactions    Centratex [iron-folic acid-mv, min cmb#15]     Cephalexin     Erythromycin     Hydroxyzine     Iodinated contrast media     Iodine     Naldecon dx     Penicillins Other (See Comments)     unknown    Tetanus vaccines and toxoid     Vioxx [rofecoxib]        No current facility-administered medications on file prior to encounter.     Current Outpatient Medications on File Prior to Encounter   Medication Sig    B-complex with vitamin C (Z-BEC OR EQUIV) tablet Take 1 tablet by mouth once daily.    budesonide (ENTOCORT EC) 3 mg capsule Take 3 mg by mouth once daily.    carvediloL (COREG) 12.5 MG tablet Take 12.5 mg by mouth 2 (two) times a day.    cholecalciferol, vitamin D3, (VITAMIN D3) 50 mcg (2,000 unit) Cap capsule Take 2,000 Units by mouth once daily.    donepeziL (ARICEPT) 5 MG tablet Take 1 tablet (5 mg total) by mouth every evening.    ELIQUIS 5 mg Tab Take 5 mg by mouth 2 (two) times daily.    famotidine (PEPCID) 20 MG tablet Take 20 mg by mouth every evening.    fenofibrate 160 MG Tab Take 160 mg by mouth once daily.    ferrous sulfate (FEOSOL) Tab tablet Take 1 tablet by mouth once daily.    furosemide (LASIX) 20 MG tablet Take 1 tablet (20 mg total) by mouth once daily.    insulin glargine U-100, Lantus, (LANTUS SOLOSTAR U-100 INSULIN) 100 unit/mL (3 mL) InPn pen Inject 10 Units into the skin every evening.    insulin regular 100 unit/mL Inj injection  "Inject into the skin 4 (four) times daily as needed for High Blood Sugar.    lisinopriL 10 MG tablet Take 10 mg by mouth once daily.    memantine (NAMENDA) 5 MG Tab Take 1 tablet (5 mg total) by mouth 2 (two) times daily.    mesalamine (PENTASA) 500 MG CR capsule Take 500 mg by mouth 3 (three) times daily.    NOVOLIN R FLEXPEN 100 unit/mL (3 mL) InPn pen     omega-3 fatty acids-fish oil 684-1,200 mg CpDR Take 1 capsule by mouth once daily.    ondansetron (ZOFRAN) 4 MG tablet Take 1 tablet (4 mg total) by mouth every 6 (six) hours. (Patient taking differently: Take 4 mg by mouth every 6 (six) hours as needed for Nausea.)    pantoprazole (PROTONIX) 40 MG tablet Take 40 mg by mouth once daily.    polyethylene glycol (GLYCOLAX) 17 gram PwPk Take 17 g by mouth once daily.    sertraline (ZOLOFT) 100 MG tablet Take 100 mg by mouth once daily.    simethicone (MYLICON) 125 MG chewable tablet Take 125 mg by mouth every 4 (four) hours as needed for Flatulence.    spironolactone (ALDACTONE) 25 MG tablet Take 25 mg by mouth once daily.    TRULICITY 0.75 mg/0.5 mL pen injector Inject 0.75 mg into the skin every 7 days. Every friday    ACCU-CHEK SMARTVIEW TEST STRIP Strp     DROPLET INSULIN SYRINGE 0.3 mL 31 gauge x 5/16" Syrg     insulin asp prt-insulin aspart, NovoLOG 70/30, (NOVOLOG MIX 70-30 U-100 INSULN) 100 unit/mL (70-30) Soln Inject 10 Units into the skin 2 (two) times daily.    quinapriL (ACCUPRIL) 20 MG tablet      Family History    Family history is unknown by patient.       Tobacco Use    Smoking status: Never    Smokeless tobacco: Never   Substance and Sexual Activity    Alcohol use: Never    Drug use: Never    Sexual activity: Not on file     Review of Systems   Constitutional:  Positive for fatigue.   HENT: Negative.     Eyes: Negative.    Respiratory: Negative.     Cardiovascular: Negative.    Gastrointestinal:  Positive for abdominal pain, blood in stool, diarrhea, nausea and vomiting.   Endocrine: Negative.  "   Genitourinary: Negative.    Musculoskeletal: Negative.    Skin: Negative.    Allergic/Immunologic: Negative.    Neurological:  Positive for dizziness and weakness.   Hematological: Negative.    Psychiatric/Behavioral: Negative.       Objective:     Vital Signs (Most Recent):  Temp: 98 °F (36.7 °C) (12/18/24 0117)  Pulse: 107 (12/18/24 0120)  Resp: 18 (12/18/24 0117)  BP: 100/69 (12/18/24 0120)  SpO2: 98 % (12/18/24 0120) Vital Signs (24h Range):  Temp:  [97 °F (36.1 °C)-98 °F (36.7 °C)] 98 °F (36.7 °C)  Pulse:  [] 107  Resp:  [15-23] 18  SpO2:  [96 %-100 %] 98 %  BP: ()/(50-77) 100/69     Weight: 81.6 kg (180 lb)  Body mass index is 27.37 kg/m².     Physical Exam  Constitutional:       Appearance: Normal appearance. She is normal weight.   HENT:      Head: Normocephalic and atraumatic.      Mouth/Throat:      Mouth: Mucous membranes are dry.      Pharynx: Oropharynx is clear.   Eyes:      Extraocular Movements: Extraocular movements intact.      Conjunctiva/sclera: Conjunctivae normal.      Pupils: Pupils are equal, round, and reactive to light.   Cardiovascular:      Rate and Rhythm: Normal rate and regular rhythm.      Pulses: Normal pulses.      Heart sounds: Normal heart sounds.   Pulmonary:      Effort: Pulmonary effort is normal.      Breath sounds: Normal breath sounds.   Abdominal:      General: Abdomen is flat. Bowel sounds are normal.      Palpations: Abdomen is soft.      Comments: Erythema at abd folds   Musculoskeletal:         General: Normal range of motion.      Cervical back: Normal range of motion and neck supple.   Skin:     General: Skin is warm and dry.      Comments: Rt hand skin tear, and sacral decub   Neurological:      General: No focal deficit present.      Mental Status: She is alert and oriented to person, place, and time. Mental status is at baseline.   Psychiatric:         Mood and Affect: Mood normal.              CRANIAL NERVES     CN III, IV, VI   Pupils are equal,  "round, and reactive to light.       Significant Labs: All pertinent labs within the past 24 hours have been reviewed.  A1C: No results for input(s): "HGBA1C" in the last 4320 hours.  ABGs: No results for input(s): "PH", "PCO2", "HCO3", "POCSATURATED", "BE", "TOTALHB", "COHB", "METHB", "O2HB", "POCFIO2", "PO2" in the last 48 hours.  Bilirubin:   Recent Labs   Lab 12/17/24 2111   BILITOT 0.6     Blood Culture: No results for input(s): "LABBLOO" in the last 48 hours.  BMP:   Recent Labs   Lab 12/17/24 2111      K 3.3*      CO2 15*   BUN 49.0*   CREATININE 3.49*   CALCIUM 9.0   MG 1.70     CBC:   Recent Labs   Lab 12/17/24 2111   WBC 8.29   HGB 12.4   HCT 38.1        CMP:   Recent Labs   Lab 12/17/24 2111      K 3.3*      CO2 15*   BUN 49.0*   CREATININE 3.49*   CALCIUM 9.0   ALBUMIN 3.4   BILITOT 0.6   ALKPHOS 54   AST 22   ALT 23     Cardiac Markers: No results for input(s): "CKMB", "MYOGLOBIN", "BNP", "TROPISTAT" in the last 48 hours.  Coagulation: No results for input(s): "PT", "INR", "APTT" in the last 48 hours.  Lactic Acid:   Recent Labs   Lab 12/17/24 2111   LACTATE 1.3     Lipase:   Recent Labs   Lab 12/17/24 2111   LIPASE 32     Lipid Panel: No results for input(s): "CHOL", "HDL", "LDLCALC", "TRIG", "CHOLHDL" in the last 48 hours.  Magnesium:   Recent Labs   Lab 12/17/24 2111   MG 1.70     POCT Glucose: No results for input(s): "POCTGLUCOSE" in the last 48 hours.  Prealbumin: No results for input(s): "PREALBUMIN" in the last 48 hours.  Respiratory Culture: No results for input(s): "GSRESP", "RESPIRATORYC" in the last 48 hours.  Troponin:   Recent Labs   Lab 12/17/24 2111   TROPONINI 0.024     TSH:   Recent Labs   Lab 12/17/24 2111   TSH 1.446     Urine Culture: No results for input(s): "LABURIN" in the last 48 hours.  Urine Studies: No results for input(s): "COLORU", "APPEARANCEUA", "PHUR", "SPECGRAV", "PROTEINUA", "GLUCUA", "KETONESU", "BILIRUBINUA", "OCCULTUA", " ""NITRITE", "UROBILINOGEN", "LEUKOCYTESUR", "RBCUA", "WBCUA", "BACTERIA", "SQUAMEPITHEL", "HYALINECASTS" in the last 48 hours.    Invalid input(s): "WRIGHTSUR"    Significant Imaging:   Abd CT  1. No acute intraabdominal or pelvic solid organ or bowel pathology identified. Details and other findings as discussed above.   Cervical CT  1. No acute cervical spine fracture dislocation or subluxation is seen.  2. Degenerative changes and other details as above  Head CT  No acute finding  "

## 2024-12-18 NOTE — PROGRESS NOTES
[facility] - Medical Surgical Unit  [facility] Medicine  Progress Note    Patient Name: Denny Mai  MRN: [id number]  Patient Class: IP- Inpatient   Admission Date: 12/17/2024  Length of Stay: 1 days  Attending Physician: Curtis Rodriguez MD  Primary Care Provider: Asad Maya MD        Subjective:     Principal Problem:Dehydration    Interval History:   HPI: 89-year-old female with hx DM, Systolic heart failure, crohn's disease, Parox afib, Dementia resident of NH presenting via EMS from nursing home with nausea, vomiting and diarrhea and dizziness. EMS reports that patient was having diarrhea for 1 day, had an episode of vomiting today. She stood up to go to the bathroom and became dizzy and fell. There was no actual syncope, no head injury. Patient was complaining of dizziness but states it is improved. She admits to generalized abdominal discomfort but no pain. Denies any obvious known sick contacts. She does admit to pain between her shoulder blades which is chronic and unchanged. She denies fevers, chills, chest pain, shortness of breath, abdominal pain, constipation, dysuria, hematuria, , melena, headache, visual changes, neck pain, back pain, cough, rhinorrhea, sore throat, numbness/tingling, focal deficits. She does have a skin tear to her Left hand, small decub to her sacrum also reported had a a little of hemorrhoid bleed when she was assisted to go to the bedside commode.    12/18-when seen on rounds this morning was lying comfortably in bed; nausea/vomiting have resolved, she is tolerating clear liquids; she continues to experience diarrhea; dizziness is improved    Objective:     Vital Signs (Most Recent):  Temp: 97.5 °F (36.4 °C) (12/18/24 1119)  Pulse: 68 (12/18/24 1119)  Resp: 18 (12/18/24 1119)  BP: (!) 95/47 (12/18/24 1119)  SpO2: 95 % (12/18/24 1119) Vital Signs (24h Range):  Temp:  [97 °F (36.1 °C)-98 °F (36.7 °C)] 97.5 °F (36.4 °C)  Pulse:  [] 68  Resp:  [15-23]  18  SpO2:  [95 %-100 %] 95 %  BP: ()/(47-77) 95/47     Weight: 81.6 kg (180 lb)  Body mass index is 27.37 kg/m².    Intake/Output Summary (Last 24 hours) at 12/18/2024 1232  Last data filed at 12/18/2024 0800  Gross per 24 hour   Intake 438.66 ml   Output --   Net 438.66 ml      Physical exam  Constitution-well nourished, normally developed female in NAD  Eyes-PERRL, EOMI  HENT-normocephalic, atraumatic  Neck-supple  Respiratory-normal respirations; CTA with good air movement  Heart-RRR; normal S1 and S2; no murmurs, gallops  Abdomen-soft, nontender, nondistended; active bowel sounds  Genitourinary-deferred  Musculoskeletal-no joint abnormalities, normal ROM throughout; no edema  Skin-warm, dry; she has 2 small pustular lesions noted on right ankle consistent with insect/ant bites  Neurologic-alert and oriented x3; nonfocal exam    Scheduled Meds:   apixaban  2.5 mg Oral BID    budesonide  3 mg Oral Daily    carvediloL  12.5 mg Oral BID    donepeziL  5 mg Oral QHS    famotidine  20 mg Oral QHS    insulin glargine U-100  10 Units Subcutaneous QHS    miconazole NITRATE 2 %   Topical (Top) BID    pantoprazole  40 mg Oral Daily    sertraline  100 mg Oral Daily    sulfaSALAzine  500 mg Oral BID     Continuous Infusions:   0.9% NaCl   Intravenous Continuous 100 mL/hr at 12/18/24 1133 New Bag at 12/18/24 1133     PRN Meds:.  Current Facility-Administered Medications:     acetaminophen, 650 mg, Oral, Q8H PRN    dextrose 50%, 12.5 g, Intravenous, PRN    dextrose 50%, 25 g, Intravenous, PRN    glucagon (human recombinant), 1 mg, Intramuscular, PRN    glucose, 16 g, Oral, PRN    glucose, 24 g, Oral, PRN    insulin aspart U-100, 0-10 Units, Subcutaneous, QID (AC + HS) PRN    loperamide, 2 mg, Oral, QID PRN    melatonin, 6 mg, Oral, Nightly PRN    ondansetron, 4 mg, Intravenous, Q8H PRN    sodium chloride 0.9%, 10 mL, Intravenous, PRN    Significant Labs: All pertinent labs within the past 24 hours have been  "reviewed.  Bilirubin:   Recent Labs   Lab 12/17/24 2111   BILITOT 0.6     CBC:   Recent Labs   Lab 12/17/24 2111 12/18/24  0832   WBC 8.29 5.62   HGB 12.4 11.9*   HCT 38.1 36.8*    265     CMP:   Recent Labs   Lab 12/17/24 2111 12/18/24  0832    138   K 3.3* 3.2*    111*   CO2 15* 17*   BUN 49.0* 46.0*   CREATININE 3.49* 2.55*   CALCIUM 9.0 8.8   ALBUMIN 3.4  --    BILITOT 0.6  --    ALKPHOS 54  --    AST 22  --    ALT 23  --      Lipase:   Recent Labs   Lab 12/17/24 2111   LIPASE 32     Magnesium:   Recent Labs   Lab 12/17/24 2111   MG 1.70     POCT Glucose: No results for input(s): "POCTGLUCOSE" in the last 48 hours.  Urine Studies: No results for input(s): "COLORU", "APPEARANCEUA", "PHUR", "SPECGRAV", "PROTEINUA", "GLUCUA", "KETONESU", "BILIRUBINUA", "OCCULTUA", "NITRITE", "UROBILINOGEN", "LEUKOCYTESUR", "RBCUA", "WBCUA", "BACTERIA", "SQUAMEPITHEL", "HYALINECASTS" in the last 48 hours.    Invalid input(s): "WRIGHTSUR"    Significant Imaging:   CT head  Impression:  1. No acute intracranial abnormality identified  2. Generalized cerebral and cerebellar atrophy    CT C-spine  Impression:  1. Slight anterolisthesis of C2 on C3 and C7 on T1 which have a similar appearance to the prior exam  2. Advanced cervical spondylosis  3. Reversal of the normal lordotic curve with the apex at C4-5 findings and other details as above    CT abdomen/pelvis  Impression:  1. Fluid-filled loops of large and small bowel up to the rectum  2. Status post cholecystectomy  3. Thoracolumbar spondylosis and scoliosis  4. Localized edema in the subcutaneous tissues just posterior inferior to the coccyx.  Clinical correlation is indicated.    CXR  Impression:  1. No active cardiopulmonary disease identified    XR pelvis  Impression:  1. Somewhat limited exam due to patient body habitus and lack of beam penetration  2. No definite acute osseous defect identified  3. Osteo arthritis  4. Osteopenia  5. Follow-up exams, CT, " and or MR examination would allow further evaluation if clinically indicated.    XR left hand  Impression:  1. No acute osseous defect identified  2. Osteoarthritis  3. Osteopenia  4. Atherosclerosis    Assessment/Plan:   Acute gastroenteritis resulting in dehydration  Symptoms appear to be resolving; patient now tolerating clear liquids  Attempt trial of Imodium to control diarrhea   Continue IV fluids    Acute kidney injury  Indices trending down  Avoid nephrotoxic agents as possible  Monitor urine output    Diabetes mellitus  Hold oral medications  Monitor blood sugars with Accu-Cheks and SSI    History of Crohn's disease  Continue budesonide, sulfasalazine    Paroxysmal Afib/ppm status  She is in paced rhythm  Resume OAC with low-dose apixaban as she has had no further hemorrhoidal bleeding    Chronic HFrEF  Continue beta-blocker; continue to hold ACE inhibitor, Aldactone    Skin tear/superficial decubitus wound on sacrum  Wound care nurse consulted    GI prophylaxis: Pantoprazole    Active Diagnoses:    Diagnosis Date Noted POA    PRINCIPAL PROBLEM:  Dehydration [E86.0] 12/17/2024 Yes    KIM (acute kidney injury) [N17.9] 12/17/2024 Unknown    Fall [W19.XXXA] 12/17/2024 Unknown    Syncope [R55] 12/17/2024 Unknown    Hypokalemia [E87.6] 12/17/2024 Unknown    Nausea and vomiting [R11.2] 12/17/2024 Unknown    Diarrhea [R19.7] 12/17/2024 Unknown      Problems Resolved During this Admission:     VTE Risk Mitigation (From admission, onward)           Ordered     apixaban tablet 2.5 mg  2 times daily         12/18/24 1230     IP VTE HIGH RISK PATIENT  Once         12/17/24 2346     Place sequential compression device  Until discontinued         12/17/24 2346                       Joss Barker MD  Department of Hospital Medicine   [facility] - Medical Surgical Unit

## 2024-12-18 NOTE — PROGRESS NOTES
While attempting to sit on side of bed, patient became dizzy and had near syncopal episode. Orthostatic vitals performed, see attached. Patient dizzy for approximately 1 minute, then able to stand and transfer to bedside commode.    12/18/24 0117 12/18/24 0119 12/18/24 0120   Vital Signs   Temp 98 °F (36.7 °C)  --   --    Temp Source Oral  --   --    Pulse 70 74 107   Resp 18  --   --    SpO2 98 % 98 % 98 %   BP 98/60 106/61 100/69   MAP (mmHg) 73 76 80   BP Location Left arm Left arm Left arm   BP Method Automatic Automatic Automatic   Patient Position Lying Sitting Standing   Orthostatic VS Yes Yes Yes   Is Patient Symptomatic in this Position? No Yes No   Associated Signs/Symptoms  --  dizziness  (near syncope)  --

## 2024-12-19 LAB
ANION GAP SERPL CALC-SCNC: 8 MEQ/L
BASOPHILS # BLD AUTO: 0.02 X10(3)/MCL
BASOPHILS NFR BLD AUTO: 0.3 %
BUN SERPL-MCNC: 42 MG/DL (ref 9.8–20.1)
C DIFF TOX A+B STL QL IA: NEGATIVE
CALCIUM SERPL-MCNC: 9.2 MG/DL (ref 8.4–10.2)
CHLORIDE SERPL-SCNC: 117 MMOL/L (ref 98–107)
CLOSTRIDIUM DIFFICILE GDH ANTIGEN (OHS): NEGATIVE
CO2 SERPL-SCNC: 14 MMOL/L (ref 23–31)
CREAT SERPL-MCNC: 1.41 MG/DL (ref 0.55–1.02)
CREAT/UREA NIT SERPL: 30
EOSINOPHIL # BLD AUTO: 0.04 X10(3)/MCL (ref 0–0.9)
EOSINOPHIL NFR BLD AUTO: 0.6 %
ERYTHROCYTE [DISTWIDTH] IN BLOOD BY AUTOMATED COUNT: 15.3 % (ref 11.5–17)
GFR SERPLBLD CREATININE-BSD FMLA CKD-EPI: 36 ML/MIN/1.73/M2
GLUCOSE SERPL-MCNC: 137 MG/DL (ref 82–115)
HCT VFR BLD AUTO: 37 % (ref 37–47)
HGB BLD-MCNC: 11.9 G/DL (ref 12–16)
IMM GRANULOCYTES # BLD AUTO: 0.02 X10(3)/MCL (ref 0–0.04)
IMM GRANULOCYTES NFR BLD AUTO: 0.3 %
LYMPHOCYTES # BLD AUTO: 1.29 X10(3)/MCL (ref 0.6–4.6)
LYMPHOCYTES NFR BLD AUTO: 18.9 %
MAGNESIUM SERPL-MCNC: 2.3 MG/DL (ref 1.6–2.6)
MCH RBC QN AUTO: 26.9 PG (ref 27–31)
MCHC RBC AUTO-ENTMCNC: 32.2 G/DL (ref 33–36)
MCV RBC AUTO: 83.5 FL (ref 80–94)
MONOCYTES # BLD AUTO: 0.82 X10(3)/MCL (ref 0.1–1.3)
MONOCYTES NFR BLD AUTO: 12 %
NEUTROPHILS # BLD AUTO: 4.62 X10(3)/MCL (ref 2.1–9.2)
NEUTROPHILS NFR BLD AUTO: 67.9 %
OHS QRS DURATION: 174 MS
OHS QTC CALCULATION: 527 MS
PLATELET # BLD AUTO: 267 X10(3)/MCL (ref 130–400)
PMV BLD AUTO: 11.3 FL (ref 7.4–10.4)
POCT GLUCOSE: 113 MG/DL (ref 70–110)
POCT GLUCOSE: 139 MG/DL (ref 70–110)
POCT GLUCOSE: 142 MG/DL (ref 70–110)
POCT GLUCOSE: 145 MG/DL (ref 70–110)
POCT GLUCOSE: 159 MG/DL (ref 70–110)
POCT GLUCOSE: 165 MG/DL (ref 70–110)
POCT GLUCOSE: 174 MG/DL (ref 70–110)
POCT GLUCOSE: 176 MG/DL (ref 70–110)
POTASSIUM SERPL-SCNC: 3.3 MMOL/L (ref 3.5–5.1)
RBC # BLD AUTO: 4.43 X10(6)/MCL (ref 4.2–5.4)
SODIUM SERPL-SCNC: 139 MMOL/L (ref 136–145)
WBC # BLD AUTO: 6.81 X10(3)/MCL (ref 4.5–11.5)

## 2024-12-19 PROCEDURE — 25000003 PHARM REV CODE 250: Performed by: EMERGENCY MEDICINE

## 2024-12-19 PROCEDURE — 36415 COLL VENOUS BLD VENIPUNCTURE: CPT | Performed by: EMERGENCY MEDICINE

## 2024-12-19 PROCEDURE — 97116 GAIT TRAINING THERAPY: CPT

## 2024-12-19 PROCEDURE — 80048 BASIC METABOLIC PNL TOTAL CA: CPT | Performed by: EMERGENCY MEDICINE

## 2024-12-19 PROCEDURE — 87507 IADNA-DNA/RNA PROBE TQ 12-25: CPT | Performed by: EMERGENCY MEDICINE

## 2024-12-19 PROCEDURE — 63600175 PHARM REV CODE 636 W HCPCS: Performed by: EMERGENCY MEDICINE

## 2024-12-19 PROCEDURE — 25000003 PHARM REV CODE 250: Performed by: INTERNAL MEDICINE

## 2024-12-19 PROCEDURE — 63600175 PHARM REV CODE 636 W HCPCS: Performed by: INTERNAL MEDICINE

## 2024-12-19 PROCEDURE — 86318 IA INFECTIOUS AGENT ANTIBODY: CPT | Performed by: INTERNAL MEDICINE

## 2024-12-19 PROCEDURE — 27000207 HC ISOLATION

## 2024-12-19 PROCEDURE — 94761 N-INVAS EAR/PLS OXIMETRY MLT: CPT

## 2024-12-19 PROCEDURE — 21400001 HC TELEMETRY ROOM

## 2024-12-19 PROCEDURE — 83735 ASSAY OF MAGNESIUM: CPT | Performed by: EMERGENCY MEDICINE

## 2024-12-19 PROCEDURE — 85025 COMPLETE CBC W/AUTO DIFF WBC: CPT | Performed by: EMERGENCY MEDICINE

## 2024-12-19 RX ORDER — POTASSIUM CHLORIDE 20 MEQ/1
20 TABLET, EXTENDED RELEASE ORAL DAILY
Status: DISCONTINUED | OUTPATIENT
Start: 2024-12-19 | End: 2024-12-20 | Stop reason: HOSPADM

## 2024-12-19 RX ADMIN — PANTOPRAZOLE SODIUM 40 MG: 40 TABLET, DELAYED RELEASE ORAL at 09:12

## 2024-12-19 RX ADMIN — SULFASALAZINE 500 MG: 500 TABLET ORAL at 08:12

## 2024-12-19 RX ADMIN — POTASSIUM CHLORIDE 20 MEQ: 1500 TABLET, EXTENDED RELEASE ORAL at 09:12

## 2024-12-19 RX ADMIN — LOPERAMIDE HYDROCHLORIDE 2 MG: 2 CAPSULE ORAL at 09:12

## 2024-12-19 RX ADMIN — ONDANSETRON 4 MG: 2 INJECTION INTRAMUSCULAR; INTRAVENOUS at 03:12

## 2024-12-19 RX ADMIN — SULFASALAZINE 500 MG: 500 TABLET ORAL at 09:12

## 2024-12-19 RX ADMIN — DONEPEZIL HYDROCHLORIDE 5 MG: 5 TABLET ORAL at 09:12

## 2024-12-19 RX ADMIN — SERTRALINE HYDROCHLORIDE 100 MG: 50 TABLET ORAL at 09:12

## 2024-12-19 RX ADMIN — APIXABAN 2.5 MG: 2.5 TABLET, FILM COATED ORAL at 09:12

## 2024-12-19 RX ADMIN — APIXABAN 2.5 MG: 2.5 TABLET, FILM COATED ORAL at 08:12

## 2024-12-19 RX ADMIN — FAMOTIDINE 20 MG: 20 TABLET ORAL at 09:12

## 2024-12-19 RX ADMIN — INSULIN GLARGINE 10 UNITS: 100 INJECTION, SOLUTION SUBCUTANEOUS at 08:12

## 2024-12-19 RX ADMIN — LOPERAMIDE HYDROCHLORIDE 2 MG: 2 CAPSULE ORAL at 03:12

## 2024-12-19 RX ADMIN — CARVEDILOL 12.5 MG: 12.5 TABLET, FILM COATED ORAL at 08:12

## 2024-12-19 NOTE — PROGRESS NOTES
[facility]  [facility]    Patient Name: Denny Mai  MRN: [id number]  Date: 12/19/2024  Diagnosis: Dehydration      Subjective:           Patient ID: Denny Mai is a 89 y.o. female.    Chief Complaint: Dizziness (Pt arrives via [facility] from [facility] with c/o N/V/D x 1 day with pain between shoulder blades. Also unwitnessed fall pta due to dizziness while ambulating to bathroom. + Blood thinners. Unknown LOC. Pt's normal GCS 14.)      HPI      Past Medical History:     1. Dehydration    2. Fall    3. KIM (acute kidney injury)    4. Hypokalemia    5. Nausea and vomiting, unspecified vomiting type    6. Diarrhea, unspecified type      Wound Assessment:           Wound 12/17/24 2258 Skin Tear Left Hand #1 (Active)   12/17/24 2258 Hand   Present on Original Admission:    Primary Wound Type: Skin tear   Side: Left   Orientation:    Wound Approximate Age at First Assessment (Weeks):    Wound Number: #1   Is this injury device related?:    Incision Type:    Closure Method:    Wound Description (Comments):    Type:    Additional Comments:    Ankle-Brachial Index:    Pulses:    Removal Indication and Assessment:    Wound Outcome:    Wound Image   12/19/24 0937   Dressing Appearance Intact 12/19/24 0937   Drainage Amount Scant 12/19/24 0937   Drainage Characteristics/Odor Sanguineous 12/19/24 0937   Appearance Red;Moist 12/19/24 0937   Periwound Area Dry 12/19/24 0937   Wound Edges Defined 12/19/24 0937   Wound Length (cm) 2 cm 12/19/24 0937   Wound Width (cm) 3 cm 12/19/24 0937   Wound Depth (cm) 0.2 cm 12/19/24 0937   Wound Volume (cm^3) 1.2 cm^3 12/19/24 0937   Wound Surface Area (cm^2) 6 cm^2 12/19/24 0937   Care Cleansed with:;Sterile normal saline 12/19/24 0937   Dressing Applied 12/19/24 0937            Wound 12/18/24 0115 Pressure Injury Sacral spine (Active)   12/18/24 0115 Sacral spine   Present on Original Admission: Y   Primary Wound Type: Pressure inj   Side:     Orientation:    Wound Approximate Age at First Assessment (Weeks):    Wound Number:    Is this injury device related?:    Incision Type:    Closure Method:    Wound Description (Comments):    Type:    Additional Comments:    Ankle-Brachial Index:    Pulses:    Removal Indication and Assessment:    Wound Outcome:    Wound Image   12/19/24 0937   Pressure Injury Stage 2 12/19/24 0937   Dressing Appearance Open to air 12/19/24 0937   Drainage Amount Scant 12/19/24 0937   Drainage Characteristics/Odor Serosanguineous 12/19/24 0937   Appearance Pink;Moist 12/19/24 0937   Tissue loss description Partial thickness 12/19/24 0937   Periwound Area Dry 12/19/24 0937   Wound Edges Open 12/19/24 0937   Wound Length (cm) 0.7 cm 12/19/24 0937   Wound Width (cm) 0.3 cm 12/19/24 0937   Wound Depth (cm) 0.2 cm 12/19/24 0937   Wound Volume (cm^3) 0.042 cm^3 12/19/24 0937   Wound Surface Area (cm^2) 0.21 cm^2 12/19/24 0937   Care Cleansed with:;Sterile normal saline 12/19/24 0937   Dressing Applied 12/19/24 0937           Plan:     Dressing changes per nursing staff, re-evaluation per wound care in 5-7 days. Please call clinic if wounds should worsen          Recommendations:   Standing orders for stage 2 pressure injury and skin tears initiated. Please follow these orders, call clinic if wounds should worsen        Time spent in room:     Haley Orlando RN

## 2024-12-19 NOTE — PLAN OF CARE
Problem: Adult Inpatient Plan of Care  Goal: Plan of Care Review  Outcome: Progressing  Goal: Patient-Specific Goal (Individualized)  Outcome: Progressing  Goal: Absence of Hospital-Acquired Illness or Injury  Outcome: Progressing  Goal: Optimal Comfort and Wellbeing  Outcome: Progressing  Goal: Readiness for Transition of Care  Outcome: Progressing     Problem: Acute Kidney Injury/Impairment  Goal: Fluid and Electrolyte Balance  Outcome: Progressing  Goal: Improved Oral Intake  Outcome: Progressing  Goal: Effective Renal Function  Outcome: Progressing     Problem: Wound  Goal: Optimal Coping  Outcome: Progressing  Goal: Optimal Functional Ability  Outcome: Progressing  Goal: Absence of Infection Signs and Symptoms  Outcome: Progressing  Goal: Improved Oral Intake  Outcome: Progressing  Goal: Optimal Pain Control and Function  Outcome: Progressing  Goal: Skin Health and Integrity  Outcome: Progressing  Goal: Optimal Wound Healing  Outcome: Progressing     Problem: Fall Injury Risk  Goal: Absence of Fall and Fall-Related Injury  Outcome: Progressing     Problem: Skin Injury Risk Increased  Goal: Skin Health and Integrity  Outcome: Progressing     Problem: Fluid Volume Deficit  Goal: Fluid Balance  Outcome: Progressing     Problem: Electrolyte Imbalance  Goal: Electrolyte Balance  Outcome: Progressing     Problem: Diarrhea  Goal: Effective Diarrhea Management  Outcome: Progressing     Problem: Nausea and Vomiting  Goal: Nausea and Vomiting Relief  Outcome: Progressing     Problem: Comorbidity Management  Goal: Maintenance of Heart Failure Symptom Control  Outcome: Progressing  Goal: Blood Pressure in Desired Range  Outcome: Progressing     Problem: Infection  Goal: Absence of Infection Signs and Symptoms  Outcome: Progressing

## 2024-12-19 NOTE — PT/OT/SLP PROGRESS
Physical Therapy Treatment    Patient Name:  Denny Mai   MRN:  [id number]    Recommendations:     Discharge Recommendations:    Discharge Equipment Recommendations:    Barriers to discharge:  current medical status    Assessment:     Denny Mai is a 89 y.o. female admitted with a medical diagnosis of Dehydration.  She presents with the following impairments/functional limitations: weakness, impaired endurance, impaired self care skills, impaired functional mobility, gait instability, impaired balance, impaired cognition, decreased coordination, impaired coordination, impaired cardiopulmonary response to activity .    Pt feeling better today but was limited due to c/o tiredness and feeling woozy when ambulating. She was able to ambulate about 10 ft with RW and CGA but required rest break in chair afterwards. Her bed mobility continues to improve as she was able to transfer supine to sit with SBA and sit to stand with CGA.     Rehab Prognosis: Fair; patient would benefit from acute skilled PT services to address these deficits and reach maximum level of function.    Recent Surgery: * No surgery found *      Plan:     During this hospitalization, patient to be seen   to address the identified rehab impairments via   and progress toward the following goals:    Plan of Care Expires:       Subjective     Chief Complaint: c/o feeling woozy when ambulating as well as overall being tired   Patient/Family Comments/goals: return to nursing home  Pain/Comfort:         Objective:     Communicated with pt and nurse prior to session.  Patient found HOB elevated with   upon PT entry to room.     General Precautions: Standard,    Orthopedic Precautions:    Braces:    Respiratory Status: Room air     Functional Mobility:  Bed Mobility:     Supine to Sit: stand by assistance  Transfers:     Sit to Stand:  contact guard assistance with rolling walker  Gait: pt ambulated 10 ft with RW, CGA, and one helper following with  chair for safety  Balance: fair+      AM-PAC 6 CLICK MOBILITY          Treatment & Education:  See tx above. Pt transferred into chair with Becky after ambulating due to feeling woozy and tired.     Patient left up in chair with all lines intact, call button in reach, and chair alarm on..    GOALS:   Multidisciplinary Problems       Physical Therapy Goals          Problem: Physical Therapy    Goal Priority Disciplines Outcome Interventions   Physical Therapy Goal     PT, PT/OT Progressing    Description: 1.  Pt will amb 50-150ft RW SBA  2.  Pt will tf SBA  3.  Pt will be Indep HEP to maintain gains made in skilled PT program  4.  Pt will be [place] bed mob                       Time Tracking:     PT Received On: 12/19/24  PT Start Time: 1015     PT Stop Time: 1032  PT Total Time (min): 17 min     Billable Minutes: Gait Training 17    Treatment Type: Treatment  PT/PTA: PT         I certify that I was present in the room directing the student in service delivery and guiding them using my skilled judgment. As the co-signing therapist I have reviewed the students documentation and am responsible for the treatment, assessment, and plan. '    12/19/2024

## 2024-12-19 NOTE — PROGRESS NOTES
Inpatient Nutrition Evaluation    Admit Date: 12/17/2024   Total duration of encounter: 2 days    Nutrition Recommendation/Prescription     Continue CL diet as tolerated and when medically appropriate, advance to GI Soft Diabetic Diet as tolerated.    Monitor lytes and replete as needed.   Monitor diet advancement, intake, tolerance, weight, and labs.     RD following and available as needed. Thank you.     Nutrition Assessment     Chart Review    Reason Seen: continuous nutrition monitoring    Malnutrition Screening Tool Results   Have you recently lost weight without trying?: No  Have you been eating poorly because of a decreased appetite?: No   MST Score: 0     Diagnosis:  Dehydration.   KIM.    Relevant Medical History:   Anemia, unspecified      CAD (coronary artery disease)      Chronic systolic HF (heart failure)      Crohn disease      Dementia      DM (diabetes mellitus)      Hemorrhoids      History of coronary angioplasty with insertion of stent      HLD (hyperlipidemia)      HTN (hypertension)      Memory loss      Paroxysmal atrial fibrillation          Nutrition-Related Medications:   Insulin; Protonix; Kcl; Donepezil.     Nutrition-Related Labs:  12/19: Hgb 11.9(L); K+ 3.3(L); Cl 117(H); CO2 14(l); BUN/Crea 42.0/1.41(H); GFR 36(L); (H).  3/21: A1C 7.2(H).    Diet Order: Diet Clear Liquid Standard Tray  Oral Supplement Order: none  Appetite/Oral Intake: poor/0-25% of meals  Factors Affecting Nutritional Intake: diarrhea, nausea, and vomiting  Food/Buddhism/Cultural Preferences: unable to obtain  Food Allergies:  Iodine.        Wound(s):      Wound 12/18/24 0115 Pressure Injury Sacral spine-Tissue loss description: Partial thickness Noted.     Comments    12/19: Pt presented to hospital from nursing home with N/V/D x 1 day. Currently on CL diet, tolerating CL so far with no diarrhea today. No recent weight loss noted/reported. Labs and meds reviewed. Renal indices improving. K+ low. Will  "continue to monitor during stay.     Anthropometrics    Height: 5' 8" (172.7 cm) Height Method: Stated  Last Weight: 81.6 kg (180 lb) (12/17/24 2058) Weight Method: Standard Scale  BMI (Calculated): 27.4  BMI Classification: overweight (BMI 25-29.9)     Ideal Body Weight (IBW), Female: 140 lb     % Ideal Body Weight, Female (lb): 128.57 %                             Usual Weight Provided By: unable to obtain usual weight    Wt Readings from Last 5 Encounters:   12/17/24 81.6 kg (180 lb)   10/24/24 84.8 kg (187 lb)   08/28/23 85.2 kg (187 lb 13.3 oz)   09/25/22 86.2 kg (190 lb)     Weight Change(s) Since Admission:  Admit Weight: 81.6 kg (180 lb) (12/17/24 2058)      Patient Education    Not applicable.    Monitoring & Evaluation     Dietitian will monitor food and beverage intake, energy intake, weight, weight change, electrolyte/renal panel, glucose/endocrine profile, and gastrointestinal profile.  Nutrition Risk/Follow-Up: low (follow-up in 5-7 days)  Patients assigned 'low nutrition risk' status do not qualify for a full nutritional assessment but will be monitored and re-evaluated in a 5-7 day time period. Please consult if re-evaluation needed sooner.  "

## 2024-12-19 NOTE — PT/OT/SLP EVAL
Physical Therapy Evaluation    Patient Name:  Denny Mai   MRN:  [id number]    Recommendations:     Discharge Recommendations: Moderate Intensity Therapy   Discharge Equipment Recommendations: none   Barriers to discharge:  current medical status    Assessment:     Denny Mai is a 89 y.o. female admitted with a medical diagnosis of Dehydration.  She presents with the following impairments/functional limitations: weakness, impaired endurance, impaired self care skills, impaired functional mobility, gait instability, impaired balance, impaired cognition, decreased coordination, impaired coordination, impaired cardiopulmonary response to activity .    Pt currently resides at NH, was ambulatory with walker indep but had a fall, and is suffering with low BP, frequent diarrhea and dizziness when upright.  She has not been able to walk since admit and has a BSC in the room but has been utilizing diaper for Bms this morning.  When I assisted her to sit EOB, she had the urge for BM, so tf to BSC with modA and had loose watery bloody BM and voided in BSC and required maxA to clean and don diaper and purewhick and assist back to bed.  Pt was very nauseated and gagging into trash can while on BSC and was provided with emesis bag in bed and nurse was called in to assess pt.  Pt has had a medical and functional decline and would greatly benefit from further rehab on SNF before returning to resident status.    Rehab Prognosis: Good; patient would benefit from acute skilled PT services to address these deficits and reach maximum level of function.    Recent Surgery: * No surgery found *      Plan:     During this hospitalization, patient to be seen 5 x/week to address the identified rehab impairments via gait training, therapeutic exercises, therapeutic activities, neuromuscular re-education and progress toward the following goals:    Plan of Care Expires:       Subjective     Chief Complaint: diarrhea, dizzy when up,  can't walk today  Patient/Family Comments/goals: to feel better, stop having diarrhea, be able to walk  Pain/Comfort:       Patients cultural, spiritual, Rastafarian conflicts given the current situation:      Living Environment:  NH  Prior to admission, patients level of function was indep amb with RW.  Equipment used at home: walker, rolling.  DME owned (not currently used): none.  Upon discharge, patient will have assistance from NH staff, family.    Objective:     Communicated with pt, nurse prior to session.  Patient found HOB elevated with  urge for BM  upon PT entry to room.    General Precautions: Standard, fall  Orthopedic Precautions:    Braces:    Respiratory Status: Room air    Exams:  RLE ROM: WFL  LLE ROM: WFL    Functional Mobility:  Bed Mobility:     Scooting: moderate assistance and maximal assistance  Supine to Sit: moderate assistance  Transfers:     Toilet Transfer: moderate assistance with  no AD  using  Stand Pivot      AM-PAC 6 CLICK MOBILITY  Total Score:        Treatment & Education:  Tf to bed modA.  See above.    Patient left HOB elevated with  nurse present.    GOALS:   Multidisciplinary Problems       Physical Therapy Goals          Problem: Physical Therapy    Goal Priority Disciplines Outcome Interventions   Physical Therapy Goal     PT, PT/OT Progressing    Description: 1.  Pt will amb 50-150ft RW SBA  2.  Pt will tf SBA  3.  Pt will be Indep HEP to maintain gains made in skilled PT program  4.  Pt will be [place] bed mob                       History:     Past Medical History:   Diagnosis Date    Anemia, unspecified     CAD (coronary artery disease)     Chronic systolic HF (heart failure)     Crohn disease     Dementia     DM (diabetes mellitus)     Hemorrhoids     History of coronary angioplasty with insertion of stent     HLD (hyperlipidemia)     HTN (hypertension)     Memory loss     Paroxysmal atrial fibrillation        Past Surgical History:   Procedure Laterality Date     CHOLECYSTECTOMY      VENTRICULAR CARDIAC PACEMAKER INSERTION         Time Tracking:     PT Received On: 12/18/24  PT Start Time: 1500     PT Stop Time: 1520  PT Total Time (min): 20 min     Billable Minutes: Evaluation 15 and Therapeutic Activity 5      12/19/2024

## 2024-12-19 NOTE — PROGRESS NOTES
[facility] - Medical Surgical Unit  [facility] Medicine  Progress Note    Patient Name: Denny Mai  MRN: [id number]  Patient Class: IP- Inpatient   Admission Date: 12/17/2024  Length of Stay: 2 days  Attending Physician: Joss Barker MD  Primary Care Provider: Asad Maya MD        Subjective:     Principal Problem:Dehydration    Interval History:   HPI: 89-year-old female with hx DM, Systolic heart failure, crohn's disease, Parox afib, Dementia resident of NH presenting via EMS from nursing home with nausea, vomiting and diarrhea and dizziness. EMS reports that patient was having diarrhea for 1 day, had an episode of vomiting today. She stood up to go to the bathroom and became dizzy and fell. There was no actual syncope, no head injury. Patient was complaining of dizziness but states it is improved. She admits to generalized abdominal discomfort but no pain. Denies any obvious known sick contacts. She does admit to pain between her shoulder blades which is chronic and unchanged. She denies fevers, chills, chest pain, shortness of breath, abdominal pain, constipation, dysuria, hematuria, , melena, headache, visual changes, neck pain, back pain, cough, rhinorrhea, sore throat, numbness/tingling, focal deficits. She does have a skin tear to her Left hand, small decub to her sacrum also reported had a a little of hemorrhoid bleed when she was assisted to go to the bedside commode.    12/18-when seen on rounds this morning was lying comfortably in bed; nausea/vomiting have resolved, she is tolerating clear liquids; she continues to experience diarrhea; dizziness is improved    12/19-continued to have multiple episodes of diarrhea overnight although has had none this morning; she continues to tolerate clear liquids    Objective:     Vital Signs (Most Recent):  Temp: 97.5 °F (36.4 °C) (12/19/24 1101)  Pulse: 73 (12/19/24 1101)  Resp: 20 (12/19/24 0945)  BP: (!) 103/56 (12/19/24  1101)  SpO2: 98 % (12/19/24 1101) Vital Signs (24h Range):  Temp:  [97.3 °F (36.3 °C)-97.7 °F (36.5 °C)] 97.5 °F (36.4 °C)  Pulse:  [68-88] 73  Resp:  [16-20] 20  SpO2:  [94 %-98 %] 98 %  BP: ()/(43-61) 103/56     Weight: 81.6 kg (180 lb)  Body mass index is 27.37 kg/m².    Intake/Output Summary (Last 24 hours) at 12/19/2024 1110  Last data filed at 12/19/2024 0948  Gross per 24 hour   Intake 480 ml   Output 700 ml   Net -220 ml      Physical exam  Constitution-well nourished, normally developed female in NAD  Eyes-PERRL, EOMI  HENT-normocephalic, atraumatic  Neck-supple  Respiratory-normal respirations; CTA with good air movement  Heart-RRR; normal S1 and S2; no murmurs, gallops  Abdomen-soft, nontender, nondistended  Genitourinary-deferred  Musculoskeletal-no joint abnormalities, normal ROM throughout; no edema  Skin-warm, dry; she has 2 small pustular lesions noted on right ankle consistent with insect/ant bites; dressing in place over skin tear left hand; also noted to have a stage II sacral decubitus wound; photos reviewed  Neurologic-alert and oriented x3; nonfocal exam    Scheduled Meds:   apixaban  2.5 mg Oral BID    carvediloL  12.5 mg Oral BID    donepeziL  5 mg Oral QHS    famotidine  20 mg Oral QHS    insulin glargine U-100  10 Units Subcutaneous QHS    miconazole NITRATE 2 %   Topical (Top) BID    pantoprazole  40 mg Oral Daily    potassium chloride  20 mEq Oral Daily    sertraline  100 mg Oral Daily    sulfaSALAzine  500 mg Oral BID     Continuous Infusions:      PRN Meds:.  Current Facility-Administered Medications:     acetaminophen, 650 mg, Oral, Q8H PRN    dextrose 50%, 12.5 g, Intravenous, PRN    dextrose 50%, 25 g, Intravenous, PRN    glucagon (human recombinant), 1 mg, Intramuscular, PRN    glucose, 16 g, Oral, PRN    glucose, 24 g, Oral, PRN    insulin aspart U-100, 0-10 Units, Subcutaneous, QID (AC + HS) PRN    loperamide, 2 mg, Oral, QID PRN    melatonin, 6 mg, Oral, Nightly PRN     "ondansetron, 4 mg, Intravenous, Q8H PRN    sodium chloride 0.9%, 10 mL, Intravenous, PRN    Significant Labs: All pertinent labs within the past 24 hours have been reviewed.  Bilirubin:   Recent Labs   Lab 12/17/24 2111   BILITOT 0.6     CBC:   Recent Labs   Lab 12/17/24 2111 12/18/24  0832 12/19/24  0254   WBC 8.29 5.62 6.81   HGB 12.4 11.9* 11.9*   HCT 38.1 36.8* 37.0    265 267     CMP:   Recent Labs   Lab 12/17/24 2111 12/18/24  0832 12/19/24  0254    138 139   K 3.3* 3.2* 3.3*    111* 117*   CO2 15* 17* 14*   BUN 49.0* 46.0* 42.0*   CREATININE 3.49* 2.55* 1.41*   CALCIUM 9.0 8.8 9.2   ALBUMIN 3.4  --   --    BILITOT 0.6  --   --    ALKPHOS 54  --   --    AST 22  --   --    ALT 23  --   --      Lipase:   Recent Labs   Lab 12/17/24 2111   LIPASE 32     Magnesium:   Recent Labs   Lab 12/17/24 2111 12/19/24  0254   MG 1.70 2.30     POCT Glucose:   Recent Labs   Lab 12/18/24  1818 12/18/24  2150 12/19/24  0601   POCTGLUCOSE 176* 174* 139*     Urine Studies: No results for input(s): "COLORU", "APPEARANCEUA", "PHUR", "SPECGRAV", "PROTEINUA", "GLUCUA", "KETONESU", "BILIRUBINUA", "OCCULTUA", "NITRITE", "UROBILINOGEN", "LEUKOCYTESUR", "RBCUA", "WBCUA", "BACTERIA", "SQUAMEPITHEL", "HYALINECASTS" in the last 48 hours.    Invalid input(s): "WRIGHTSUR"    Significant Imaging:   CT head  Impression:  1. No acute intracranial abnormality identified  2. Generalized cerebral and cerebellar atrophy    CT C-spine  Impression:  1. Slight anterolisthesis of C2 on C3 and C7 on T1 which have a similar appearance to the prior exam  2. Advanced cervical spondylosis  3. Reversal of the normal lordotic curve with the apex at C4-5 findings and other details as above    CT abdomen/pelvis  Impression:  1. Fluid-filled loops of large and small bowel up to the rectum  2. Status post cholecystectomy  3. Thoracolumbar spondylosis and scoliosis  4. Localized edema in the subcutaneous tissues just posterior inferior to the " coccyx.  Clinical correlation is indicated.    CXR  Impression:  1. No active cardiopulmonary disease identified    XR pelvis  Impression:  1. Somewhat limited exam due to patient body habitus and lack of beam penetration  2. No definite acute osseous defect identified  3. Osteo arthritis  4. Osteopenia  5. Follow-up exams, CT, and or MR examination would allow further evaluation if clinically indicated.    XR left hand  Impression:  1. No acute osseous defect identified  2. Osteoarthritis  3. Osteopenia  4. Atherosclerosis    Assessment/Plan:   Acute gastroenteritis resulting in dehydration  N/V resolved; tolerating clear liquids  Had multiple episodes diarrhea overnight but much improved this morning  Discontinue IV fluids    Acute kidney injury  Indices trending down  Avoid nephrotoxic agents as possible  Monitor urine output    Metabolic acidosis, normal AG  Due to hyperchloremia; may also have RTA related to diarrhea and bicarb losses  As noted IV fluids discontinued  If persists consider addition of oral sodium bicarb    Diabetes mellitus  Hold oral medications  Monitor blood sugars with Accu-Cheks and SSI    History of Crohn's disease  Continue budesonide, sulfasalazine    Paroxysmal Afib/ppm status  She is in paced rhythm  Resume OAC with low-dose apixaban as she has had no further hemorrhoidal bleeding    Chronic HFrEF  Continue beta-blocker; continue to hold ACE inhibitor, Aldactone    Skin tear/stage II decubitus wound on sacrum  Wound care nurse has seen patient    GI prophylaxis: Pantoprazole    Active Diagnoses:    Diagnosis Date Noted POA    PRINCIPAL PROBLEM:  Dehydration [E86.0] 12/17/2024 Yes    KIM (acute kidney injury) [N17.9] 12/17/2024 Unknown    Fall [W19.XXXA] 12/17/2024 Unknown    Syncope [R55] 12/17/2024 Unknown    Hypokalemia [E87.6] 12/17/2024 Unknown    Nausea and vomiting [R11.2] 12/17/2024 Unknown    Diarrhea [R19.7] 12/17/2024 Unknown      Problems Resolved During this Admission:      VTE Risk Mitigation (From admission, onward)           Ordered     apixaban tablet 2.5 mg  2 times daily         12/18/24 1230     IP VTE HIGH RISK PATIENT  Once         12/17/24 2346     Place sequential compression device  Until discontinued         12/17/24 2346                       Joss Barker MD  Department of Hospital Medicine   [facility] - Medical Surgical Unit

## 2024-12-19 NOTE — PLAN OF CARE
12/19/24 1119   Discharge Assessment   Assessment Type Discharge Planning Brief Assessment   People in Home facility resident   Facility Arrived From: [facility] resident   Do you expect to return to your current living situation? Yes   Walking or Climbing Stairs Difficulty yes   Walking or Climbing Stairs ambulation difficulty, requires equipment;ambulation difficulty, assistance 1 person   Mobility Management RW with assist   Dressing/Bathing Difficulty yes   Dressing/Bathing bathing difficulty, assistance 1 person   Dressing/Bathing Management caregiver assist   Home Accessibility wheelchair accessible   Home Layout Able to live on 1st floor   Discharge Plan A Return to nursing home   Discharge Plan B Return to Nursing Home

## 2024-12-19 NOTE — PROGRESS NOTES
Patient has had several Bowel movements, x10 episodes this shift. Loose, watery stools. Patient also c/o nausea and vomiting episodes. Contacted hospitalist, Dr. Nohemi ortiz order to collect C- Diff, contact precautions for 48 hours.

## 2024-12-20 VITALS
OXYGEN SATURATION: 96 % | SYSTOLIC BLOOD PRESSURE: 104 MMHG | RESPIRATION RATE: 16 BRPM | DIASTOLIC BLOOD PRESSURE: 49 MMHG | HEART RATE: 74 BPM | HEIGHT: 68 IN | BODY MASS INDEX: 27.28 KG/M2 | WEIGHT: 180 LBS | TEMPERATURE: 98 F

## 2024-12-20 LAB
ADV 40+41 DNA STL QL NAA+NON-PROBE: NOT DETECTED
ANION GAP SERPL CALC-SCNC: 8 MEQ/L
ASTRO TYP 1-8 RNA STL QL NAA+NON-PROBE: NOT DETECTED
BASOPHILS # BLD AUTO: 0.02 X10(3)/MCL
BASOPHILS NFR BLD AUTO: 0.3 %
BUN SERPL-MCNC: 33 MG/DL (ref 9.8–20.1)
C CAYETANENSIS DNA STL QL NAA+NON-PROBE: NOT DETECTED
C COLI+JEJ+UPSA DNA STL QL NAA+NON-PROBE: NOT DETECTED
CALCIUM SERPL-MCNC: 9.5 MG/DL (ref 8.4–10.2)
CHLORIDE SERPL-SCNC: 116 MMOL/L (ref 98–107)
CO2 SERPL-SCNC: 15 MMOL/L (ref 23–31)
CREAT SERPL-MCNC: 1.14 MG/DL (ref 0.55–1.02)
CREAT/UREA NIT SERPL: 29
CRYPTOSP DNA STL QL NAA+NON-PROBE: NOT DETECTED
E HISTOLYT DNA STL QL NAA+NON-PROBE: NOT DETECTED
EAEC PAA PLAS AGGR+AATA ST NAA+NON-PRB: NOT DETECTED
EC STX1+STX2 GENES STL QL NAA+NON-PROBE: NOT DETECTED
EOSINOPHIL # BLD AUTO: 0.08 X10(3)/MCL (ref 0–0.9)
EOSINOPHIL NFR BLD AUTO: 1.2 %
EPEC EAE GENE STL QL NAA+NON-PROBE: NOT DETECTED
ERYTHROCYTE [DISTWIDTH] IN BLOOD BY AUTOMATED COUNT: 15.2 % (ref 11.5–17)
ETEC LTA+ST1A+ST1B TOX ST NAA+NON-PROBE: NOT DETECTED
G LAMBLIA DNA STL QL NAA+NON-PROBE: NOT DETECTED
GFR SERPLBLD CREATININE-BSD FMLA CKD-EPI: 46 ML/MIN/1.73/M2
GLUCOSE SERPL-MCNC: 140 MG/DL (ref 82–115)
HCT VFR BLD AUTO: 36.1 % (ref 37–47)
HGB BLD-MCNC: 11.8 G/DL (ref 12–16)
IMM GRANULOCYTES # BLD AUTO: 0.02 X10(3)/MCL (ref 0–0.04)
IMM GRANULOCYTES NFR BLD AUTO: 0.3 %
LYMPHOCYTES # BLD AUTO: 1.28 X10(3)/MCL (ref 0.6–4.6)
LYMPHOCYTES NFR BLD AUTO: 18.6 %
MAGNESIUM SERPL-MCNC: 2.1 MG/DL (ref 1.6–2.6)
MCH RBC QN AUTO: 26.8 PG (ref 27–31)
MCHC RBC AUTO-ENTMCNC: 32.7 G/DL (ref 33–36)
MCV RBC AUTO: 81.9 FL (ref 80–94)
MONOCYTES # BLD AUTO: 0.67 X10(3)/MCL (ref 0.1–1.3)
MONOCYTES NFR BLD AUTO: 9.7 %
NEUTROPHILS # BLD AUTO: 4.81 X10(3)/MCL (ref 2.1–9.2)
NEUTROPHILS NFR BLD AUTO: 69.9 %
NOROVIRUS GI+II RNA STL QL NAA+NON-PROBE: NOT DETECTED
P SHIGELLOIDES DNA STL QL NAA+NON-PROBE: NOT DETECTED
PLATELET # BLD AUTO: 261 X10(3)/MCL (ref 130–400)
PMV BLD AUTO: 11.1 FL (ref 7.4–10.4)
POCT GLUCOSE: 143 MG/DL (ref 70–110)
POCT GLUCOSE: 151 MG/DL (ref 70–110)
POTASSIUM SERPL-SCNC: 3.5 MMOL/L (ref 3.5–5.1)
RBC # BLD AUTO: 4.41 X10(6)/MCL (ref 4.2–5.4)
RVA RNA STL QL NAA+NON-PROBE: NOT DETECTED
S ENT+BONG DNA STL QL NAA+NON-PROBE: NOT DETECTED
SAPO I+II+IV+V RNA STL QL NAA+NON-PROBE: DETECTED
SHIGELLA SP+EIEC IPAH ST NAA+NON-PROBE: NOT DETECTED
SODIUM SERPL-SCNC: 139 MMOL/L (ref 136–145)
V CHOL+PARA+VUL DNA STL QL NAA+NON-PROBE: NOT DETECTED
V CHOLERAE DNA STL QL NAA+NON-PROBE: NOT DETECTED
WBC # BLD AUTO: 6.88 X10(3)/MCL (ref 4.5–11.5)
Y ENTEROCOL DNA STL QL NAA+NON-PROBE: NOT DETECTED

## 2024-12-20 PROCEDURE — 25000003 PHARM REV CODE 250: Performed by: EMERGENCY MEDICINE

## 2024-12-20 PROCEDURE — 85025 COMPLETE CBC W/AUTO DIFF WBC: CPT | Performed by: EMERGENCY MEDICINE

## 2024-12-20 PROCEDURE — 80048 BASIC METABOLIC PNL TOTAL CA: CPT | Performed by: EMERGENCY MEDICINE

## 2024-12-20 PROCEDURE — 97116 GAIT TRAINING THERAPY: CPT

## 2024-12-20 PROCEDURE — 97530 THERAPEUTIC ACTIVITIES: CPT

## 2024-12-20 PROCEDURE — 94761 N-INVAS EAR/PLS OXIMETRY MLT: CPT

## 2024-12-20 PROCEDURE — 63600175 PHARM REV CODE 636 W HCPCS: Performed by: INTERNAL MEDICINE

## 2024-12-20 PROCEDURE — 25000003 PHARM REV CODE 250: Performed by: INTERNAL MEDICINE

## 2024-12-20 PROCEDURE — 36415 COLL VENOUS BLD VENIPUNCTURE: CPT | Performed by: EMERGENCY MEDICINE

## 2024-12-20 PROCEDURE — 83735 ASSAY OF MAGNESIUM: CPT | Performed by: EMERGENCY MEDICINE

## 2024-12-20 RX ORDER — METRONIDAZOLE 500 MG/1
500 TABLET ORAL 3 TIMES DAILY
Qty: 15 TABLET | Refills: 0 | Status: SHIPPED | OUTPATIENT
Start: 2024-12-20 | End: 2024-12-20

## 2024-12-20 RX ORDER — CIPROFLOXACIN 500 MG/1
500 TABLET ORAL 2 TIMES DAILY
Start: 2024-12-20 | End: 2024-12-25

## 2024-12-20 RX ORDER — METRONIDAZOLE 500 MG/1
500 TABLET ORAL 3 TIMES DAILY
Start: 2024-12-20 | End: 2024-12-25

## 2024-12-20 RX ADMIN — PANTOPRAZOLE SODIUM 40 MG: 40 TABLET, DELAYED RELEASE ORAL at 09:12

## 2024-12-20 RX ADMIN — APIXABAN 2.5 MG: 2.5 TABLET, FILM COATED ORAL at 09:12

## 2024-12-20 RX ADMIN — POTASSIUM CHLORIDE 20 MEQ: 1500 TABLET, EXTENDED RELEASE ORAL at 09:12

## 2024-12-20 RX ADMIN — SERTRALINE HYDROCHLORIDE 100 MG: 50 TABLET ORAL at 09:12

## 2024-12-20 RX ADMIN — ONDANSETRON 4 MG: 2 INJECTION INTRAMUSCULAR; INTRAVENOUS at 01:12

## 2024-12-20 RX ADMIN — SULFASALAZINE 500 MG: 500 TABLET ORAL at 09:12

## 2024-12-20 NOTE — PLAN OF CARE
Problem: Adult Inpatient Plan of Care  Goal: Plan of Care Review  Outcome: Met  Goal: Patient-Specific Goal (Individualized)  Outcome: Met  Goal: Absence of Hospital-Acquired Illness or Injury  Outcome: Met  Goal: Optimal Comfort and Wellbeing  Outcome: Met  Goal: Readiness for Transition of Care  Outcome: Met     Problem: Acute Kidney Injury/Impairment  Goal: Fluid and Electrolyte Balance  Outcome: Met  Goal: Improved Oral Intake  Outcome: Met  Goal: Effective Renal Function  Outcome: Met     Problem: Wound  Goal: Optimal Coping  Outcome: Met  Goal: Optimal Functional Ability  Outcome: Met  Goal: Absence of Infection Signs and Symptoms  Outcome: Met  Goal: Improved Oral Intake  Outcome: Met  Goal: Optimal Pain Control and Function  Outcome: Met  Goal: Skin Health and Integrity  Outcome: Met  Goal: Optimal Wound Healing  Outcome: Met     Problem: Fall Injury Risk  Goal: Absence of Fall and Fall-Related Injury  Outcome: Met     Problem: Skin Injury Risk Increased  Goal: Skin Health and Integrity  Outcome: Met     Problem: Fluid Volume Deficit  Goal: Fluid Balance  Outcome: Met     Problem: Electrolyte Imbalance  Goal: Electrolyte Balance  Outcome: Met     Problem: Diarrhea  Goal: Effective Diarrhea Management  Outcome: Met     Problem: Nausea and Vomiting  Goal: Nausea and Vomiting Relief  Outcome: Met     Problem: Comorbidity Management  Goal: Maintenance of Heart Failure Symptom Control  Outcome: Met  Goal: Blood Pressure in Desired Range  Outcome: Met     Problem: Infection  Goal: Absence of Infection Signs and Symptoms  Outcome: Met

## 2024-12-20 NOTE — DISCHARGE SUMMARY
[facility] - Medical Surgical Unit  Hospital Medicine  Discharge Summary      Patient Name: Denny Mai  MRN: [id number]  Admission Date: 12/17/2024  Hospital Length of Stay: 3 days  Discharge Date and Time:  12/20/2024 11:01 AM  Attending Physician: Joss Barker MD   Discharging Provider: Joss Barker MD  Discharge Provider Team: Networked reference to record PCT   Primary Care Provider: Asad Maya MD        HPI:   89-year-old female with hx DM, Systolic heart failure, crohn's disease, Parox afib, Dementia resident of NH presenting via EMS from nursing home with nausea, vomiting and diarrhea and dizziness. EMS reports that patient was having diarrhea for 1 day, had an episode of vomiting today. She stood up to go to the bathroom and became dizzy and fell. There was no actual syncope, no head injury. Patient was complaining of dizziness but states it is improved. She admits to generalized abdominal discomfort but no pain. Denies any obvious known sick contacts. She does admit to pain between her shoulder blades which is chronic and unchanged. She denies fevers, chills, chest pain, shortness of breath, abdominal pain, constipation, dysuria, hematuria, , melena, headache, visual changes, neck pain, back pain, cough, rhinorrhea, sore throat, numbness/tingling, focal deficits. She does have a skin tear to her Left hand, small decub to her sacrum also reported had a a little of hemorrhoid bleed when she was assisted to go to the bedside commode.    * No surgery found *      Hospital Course:   12/18-when seen on rounds this morning was lying comfortably in bed; nausea/vomiting have resolved, she is tolerating clear liquids; she continues to experience diarrhea; dizziness is improved     12/19-continued to have multiple episodes of diarrhea overnight although has had none this morning; she continues to tolerate clear liquids    12/20-she appears to be feeling better; she did have several  bouts of diarrhea overnight most of which were of small volume; she had no episodes yesterday during the day and none thus far this morning; she continues to tolerate liquids.  I think she is stable for discharge back to the nursing home.  I discussed the importance of continuing to drink lots of liquids.  Her C diff studies were negative, GI PCR studies positive for sapovirus; nevertheless I will discharge her on short course of Cipro 500 mg b.i.d. and metronidazole 500 mg t.i.d. for 5 days.    ---    Discharge diagnoses  Acute gastroenteritis resulting in dehydration  N/V resolved; tolerating clear liquids  Had multiple episodes diarrhea overnight most of which were very small volume  GI PCR-positive for sapovirus  Discharge on Cipro and metronidazole as noted above     Acute kidney injury  Indices trending down  Avoid nephrotoxic agents as possible  Monitor urine output     Metabolic acidosis, normal AG  Due to hyperchloremia; may also have RTA related to diarrhea and bicarb losses  Serum chloride trending down; serum bicarb level trending up  I think these will continued to improve adequate fluid intake and resolution of diarrhea     Diabetes mellitus  Hold oral medications  Monitor blood sugars with Accu-Cheks and SSI     History of Crohn's disease  Continue budesonide, sulfasalazine     Paroxysmal Afib/ppm status  She is in paced rhythm  Continue OAC     Chronic HFrEF  She can resume routine medications     Skin tear/stage II decubitus wound on sacrum  She will require continued wound care at nursing home    Physical exam  Constitution-well nourished, normally developed female in NAD  Eyes-PERRL, EOMI  HENT-normocephalic, atraumatic  Neck-supple  Respiratory-normal respirations; CTA with good air movement  Heart-RRR; normal S1 and S2; no murmurs, gallops  Abdomen-soft, nontender, nondistended  Genitourinary-deferred  Musculoskeletal-no joint abnormalities, normal ROM throughout; no edema  Skin-warm, dry; she  had 2 small pustular lesions noted on right ankle consistent with insect/ant bites, resolving; dressing in place over skin tear left hand; also noted to have a stage II sacral decubitus wound; photos reviewed  Neurologic-alert and oriented x3      Consults:     Final Active Diagnoses:    Diagnosis Date Noted POA    PRINCIPAL PROBLEM:  Dehydration [E86.0] 12/17/2024 Yes    KIM (acute kidney injury) [N17.9] 12/17/2024 Unknown    Fall [W19.XXXA] 12/17/2024 Unknown    Syncope [R55] 12/17/2024 Unknown    Hypokalemia [E87.6] 12/17/2024 Unknown    Nausea and vomiting [R11.2] 12/17/2024 Unknown    Diarrhea [R19.7] 12/17/2024 Unknown      Problems Resolved During this Admission:      Discharged Condition: stable    Disposition: Nursing Facility    Follow Up:   Follow-up Information       Asad Maya MD Follow up in 1 week(s).    Specialty: Internal Medicine  Contact information:  [address] [place] [address] [id number] [phone]                           Patient Instructions:      Diet diabetic   Order Comments: At discharge, patient asked to focus on drinking clear liquids; she can advance to solids as tolerated     Activity as tolerated     Medications:  Reconciled Home Medications:      Medication List        START taking these medications      ciprofloxacin HCl 500 MG tablet  Commonly known as: CIPRO  Take 1 tablet (500 mg total) by mouth 2 (two) times daily. for 5 days     metroNIDAZOLE 500 MG tablet  Commonly known as: FLAGYL  Take 1 tablet (500 mg total) by mouth 3 (three) times daily. for 5 days            CONTINUE taking these medications      ACCU-CHEK SMARTVIEW TEST STRIP Strp  Generic drug: blood sugar diagnostic     B-complex with vitamin C tablet  Commonly known as: Z-Bec or Equiv  Take 1 tablet by mouth once daily.     budesonide 3 mg capsule  Commonly known as: ENTOCORT EC  Take 3 mg by mouth once daily.     carvediloL 12.5 MG tablet  Commonly known as: COREG  Take 12.5 mg by mouth 2 (two) times a  "day.     cholecalciferol (vitamin D3) 50 mcg (2,000 unit) Cap capsule  Commonly known as: VITAMIN D3  Take 2,000 Units by mouth once daily.     donepeziL 5 MG tablet  Commonly known as: ARICEPT  Take 1 tablet (5 mg total) by mouth every evening.     DROPLET INSULIN SYRINGE 0.3 mL 31 gauge x 5/16" Syrg  Generic drug: insulin syringe-needle U-100     ELIQUIS 5 mg Tab  Generic drug: apixaban  Take 5 mg by mouth 2 (two) times daily.     fenofibrate 160 MG Tab  Take 160 mg by mouth once daily.     ferrous sulfate Tab tablet  Commonly known as: FEOSOL  Take 1 tablet by mouth once daily.     insulin asp prt-insulin aspart (NovoLOG 70/30) 100 unit/mL (70-30) Soln  Commonly known as: NovoLOG Mix 70-30 U-100 Insuln  Inject 10 Units into the skin 2 (two) times daily.     LANTUS SOLOSTAR U-100 INSULIN 100 unit/mL (3 mL) Inpn pen  Generic drug: insulin glargine U-100 (Lantus)  Inject 10 Units into the skin every evening.     lisinopriL 10 MG tablet  Take 10 mg by mouth once daily.     memantine 5 MG Tab  Commonly known as: NAMENDA  Take 1 tablet (5 mg total) by mouth 2 (two) times daily.     mesalamine 500 MG CR capsule  Commonly known as: PENTASA  Take 500 mg by mouth 3 (three) times daily.     * insulin regular 100 unit/mL injection  Inject into the skin 4 (four) times daily as needed for High Blood Sugar.     * NovoLIN R FlexPen 100 unit/mL (3 mL) Inpn pen  Generic drug: insulin regular human     omega-3 fatty acids-fish oil 684-1,200 mg Cpdr  Take 1 capsule by mouth once daily.     ondansetron 4 MG tablet  Commonly known as: ZOFRAN  Take 1 tablet (4 mg total) by mouth every 6 (six) hours.     pantoprazole 40 MG tablet  Commonly known as: PROTONIX  Take 40 mg by mouth once daily.     quinapriL 20 MG tablet  Commonly known as: ACCUPRIL     sertraline 100 MG tablet  Commonly known as: ZOLOFT  Take 100 mg by mouth once daily.     simethicone 125 MG chewable tablet  Commonly known as: MYLICON  Take 125 mg by mouth every 4 (four) " "hours as needed for Flatulence.     spironolactone 25 MG tablet  Commonly known as: ALDACTONE  Take 25 mg by mouth once daily.     TRULICITY 0.75 mg/0.5 mL pen injector  Generic drug: dulaglutide  Inject 0.75 mg into the skin every 7 days. Every friday           * This list has 2 medication(s) that are the same as other medications prescribed for you. Read the directions carefully, and ask your doctor or other care provider to review them with you.                STOP taking these medications      famotidine 20 MG tablet  Commonly known as: PEPCID     furosemide 20 MG tablet  Commonly known as: LASIX     polyethylene glycol 17 gram Pwpk  Commonly known as: GLYCOLAX              Significant Diagnostic Studies: Labs: CMP   Recent Labs   Lab 12/19/24 0254 12/20/24 0412    139   K 3.3* 3.5   * 116*   CO2 14* 15*   BUN 42.0* 33.0*   CREATININE 1.41* 1.14*   CALCIUM 9.2 9.5   , CBC   Recent Labs   Lab 12/19/24 0254 12/20/24 0412   WBC 6.81 6.88   HGB 11.9* 11.8*   HCT 37.0 36.1*    261   , and A1C: No results for input(s): "HGBA1C" in the last 4320 hours.    Radiology:   CT head  Impression:  1. No acute intracranial abnormality identified  2. Generalized cerebral and cerebellar atrophy     CT C-spine  Impression:  1. Slight anterolisthesis of C2 on C3 and C7 on T1 which have a similar appearance to the prior exam  2. Advanced cervical spondylosis  3. Reversal of the normal lordotic curve with the apex at C4-5 findings and other details as above     CT abdomen/pelvis  Impression:  1. Fluid-filled loops of large and small bowel up to the rectum  2. Status post cholecystectomy  3. Thoracolumbar spondylosis and scoliosis  4. Localized edema in the subcutaneous tissues just posterior inferior to the coccyx.  Clinical correlation is indicated.     CXR  Impression:  1. No active cardiopulmonary disease identified     XR pelvis  Impression:  1. Somewhat limited exam due to patient body habitus and lack of " beam penetration  2. No definite acute osseous defect identified  3. Osteo arthritis  4. Osteopenia  5. Follow-up exams, CT, and or MR examination would allow further evaluation if clinically indicated.     XR left hand  Impression:  1. No acute osseous defect identified  2. Osteoarthritis  3. Osteopenia  4. Atherosclerosis    Pending Diagnostic Studies:       None          Indwelling Lines/Drains at time of discharge:   Lines/Drains/Airways       None                 Patient screened for social drivers of health:  Housing instability  Transportation needs  Utility difficulties  Interpersonal safety  ---no needs identified    Time spent on the discharge of patient: 43 minutes         Joss Barker MD  Department of Hospital Medicine  [facility] - Medical Surgical Unit

## 2024-12-20 NOTE — PT/OT/SLP PROGRESS
Physical Therapy Treatment    Patient Name:  Denny Mai   MRN:  [id number]    Recommendations:     Discharge Recommendations: Moderate Intensity Therapy  Discharge Equipment Recommendations: none  Barriers to discharge:  current medical status    Assessment:     Denny Mai is a 89 y.o. female admitted with a medical diagnosis of Dehydration.  She presents with the following impairments/functional limitations: weakness, impaired endurance, impaired self care skills, impaired functional mobility, gait instability, impaired balance, impaired cognition, decreased coordination, impaired coordination, impaired cardiopulmonary response to activity .    Pt reports she is feeling about the same today currently needing to have a bm and mobility limited due to c/o tiredness and feeling woozy when ambulating. She was able to ambulate about 10 ft with RW and CGA but required rest break in chair afterwards. Her bed mobility continues to improve as she was able to transfer supine to sit with SBA and sit to stand with CGA.  She is able to get on/off BSC with Becky but requires maxA to clean after BM.  She would benefit significantly from further rehab on skilled unit before returning to residence.    Rehab Prognosis: Fair; patient would benefit from acute skilled PT services to address these deficits and reach maximum level of function.    Recent Surgery: * No surgery found *      Plan:     During this hospitalization, patient to be seen 5 x/week to address the identified rehab impairments via gait training, therapeutic exercises, therapeutic activities, neuromuscular re-education and progress toward the following goals:    Plan of Care Expires:       Subjective     Chief Complaint: c/o feeling woozy when ambulating as well as overall being tired   Patient/Family Comments/goals: return to nursing home  Pain/Comfort:         Objective:     Communicated with pt and nurse prior to session.  Patient found HOB elevated with    upon PT entry to room.     General Precautions: Standard, fall  Orthopedic Precautions:    Braces:    Respiratory Status: Room air     Functional Mobility:  Bed Mobility:     Supine to Sit: stand by assistance  Transfers:     Sit to Stand:  contact guard assistance with rolling walker  Gait: pt ambulated 10 ft with RW, CGA, and one helper following with chair for safety  Balance: fair+      AM-PAC 6 CLICK MOBILITY          Treatment & Education:  See tx above. [place] [facility], req maxA to clean after BM bloody, tf into chair with Becky and reports she is now tired.     Patient left up in chair with all lines intact, call button in reach, and chair alarm on..    GOALS:   Multidisciplinary Problems       Physical Therapy Goals          Problem: Physical Therapy    Goal Priority Disciplines Outcome Interventions   Physical Therapy Goal     PT, PT/OT Progressing    Description: 1.  Pt will amb 50-150ft RW SBA  2.  Pt will tf SBA  3.  Pt will be Indep HEP to maintain gains made in skilled PT program  4.  Pt will be [place] bed mob                       Time Tracking:     PT Received On: 12/20/24  PT Start Time: 1000     PT Stop Time: 1030  PT Total Time (min): 30 min     Billable Minutes: Gait Training 10  Therapeutic Activity 20    Treatment Type: Treatment  PT/PTA: PT         I12/20/2024

## 2024-12-20 NOTE — PLAN OF CARE
Problem: Adult Inpatient Plan of Care  Goal: Plan of Care Review  Outcome: Progressing  Goal: Patient-Specific Goal (Individualized)  Outcome: Progressing  Goal: Absence of Hospital-Acquired Illness or Injury  Outcome: Progressing  Goal: Optimal Comfort and Wellbeing  Outcome: Progressing  Goal: Readiness for Transition of Care  Outcome: Progressing     Problem: Acute Kidney Injury/Impairment  Goal: Fluid and Electrolyte Balance  Outcome: Progressing  Goal: Improved Oral Intake  Outcome: Progressing  Goal: Effective Renal Function  Outcome: Progressing     Problem: Wound  Goal: Optimal Coping  Outcome: Progressing  Goal: Optimal Functional Ability  Outcome: Progressing  Goal: Absence of Infection Signs and Symptoms  Outcome: Progressing  Goal: Improved Oral Intake  Outcome: Progressing  Goal: Optimal Pain Control and Function  Outcome: Progressing  Goal: Skin Health and Integrity  Outcome: Progressing  Goal: Optimal Wound Healing  Outcome: Progressing     Problem: Fall Injury Risk  Goal: Absence of Fall and Fall-Related Injury  Outcome: Progressing     Problem: Skin Injury Risk Increased  Goal: Skin Health and Integrity  Outcome: Progressing     Problem: Fluid Volume Deficit  Goal: Fluid Balance  Outcome: Progressing     Problem: Electrolyte Imbalance  Goal: Electrolyte Balance  Outcome: Progressing     Problem: Nausea and Vomiting  Goal: Nausea and Vomiting Relief  Outcome: Progressing     Problem: Comorbidity Management  Goal: Maintenance of Heart Failure Symptom Control  Outcome: Progressing  Goal: Blood Pressure in Desired Range  Outcome: Progressing     Problem: Infection  Goal: Absence of Infection Signs and Symptoms  Outcome: Progressing

## 2024-12-20 NOTE — PT/OT/SLP PROGRESS
Physical Therapy Treatment    Patient Name:  Denny Mai   MRN:  [id number]    Recommendations:     Discharge Recommendations: Moderate Intensity Therapy  Discharge Equipment Recommendations: none  Barriers to discharge:  current medical status    Assessment:     Denny Mai is a 89 y.o. female admitted with a medical diagnosis of Dehydration.  She presents with the following impairments/functional limitations: weakness, impaired endurance, impaired self care skills, impaired functional mobility, gait instability, impaired balance, impaired cognition, decreased coordination, impaired coordination, impaired cardiopulmonary response to activity .    Pt able to mobilize well as she can transfer BSC to bed with CGA and only minor unsteadiness. She still requires maxA to clean and kait new diapers. She would benefit continued therapy services at skilled facility before returning to residence.     Rehab Prognosis: Fair; patient would benefit from acute skilled PT services to address these deficits and reach maximum level of function.    Recent Surgery: * No surgery found *      Plan:     During this hospitalization, patient to be seen 5 x/week to address the identified rehab impairments via gait training, therapeutic exercises, therapeutic activities, neuromuscular re-education and progress toward the following goals:    Plan of Care Expires:       Subjective     Chief Complaint: pt still not feeling well, c/o nausea   Patient/Family Comments/goals: return to nursing home  Pain/Comfort:         Objective:     Communicated with pt and nurse prior to session.  Patient found  on BSC  with   upon PT entry to room.     General Precautions: Standard, fall  Orthopedic Precautions:    Braces:    Respiratory Status: Room air     Functional Mobility:  Transfers:     Sit to Stand:  contact guard assistance with no AD      AM-PAC 6 CLICK MOBILITY          Treatment & Education:  Pt performed sit to stand to clean and kait  clean diaper with maxA. She then transferred to EOB with CGA and required a sitting break due to c/o nausea. After resting, pt laid supine in bed with CGA.    Patient left HOB elevated with all lines intact, call button in reach, and bed alarm on..    GOALS:   Multidisciplinary Problems       Physical Therapy Goals          Problem: Physical Therapy    Goal Priority Disciplines Outcome Interventions   Physical Therapy Goal     PT, PT/OT Progressing    Description: 1.  Pt will amb 50-150ft RW SBA  2.  Pt will tf SBA  3.  Pt will be Indep HEP to maintain gains made in skilled PT program  4.  Pt will be [place] bed mob                       Time Tracking:     PT Received On: 12/20/24  PT Start Time: 1330     PT Stop Time: 1345  PT Total Time (min): 15 min     Billable Minutes: Therapeutic Activity 15    Treatment Type: Treatment  PT/PTA: PT         I certify that I was present in the room directing the student in service delivery and guiding them using my skilled judgment. As the co-signing therapist I have reviewed the students documentation and am responsible for the treatment, assessment, and plan.     12/20/2024

## 2024-12-20 NOTE — PLAN OF CARE
12/20/24 1106   Final Note   Assessment Type Final Discharge Note   Anticipated Discharge Disposition [facility]   Hospital Resources/Appts/Education Provided Post-Acute resouces added to AVS   Post-Acute Status   Discharge Delays None known at this time     D/c back to [facility] today.

## 2024-12-26 ENCOUNTER — PATIENT OUTREACH (OUTPATIENT)
Dept: ADMINISTRATIVE | Facility: CLINIC | Age: 89
End: 2024-12-26
Payer: MEDICARE

## 2025-02-14 ENCOUNTER — HOSPITAL ENCOUNTER (INPATIENT)
Facility: HOSPITAL | Age: OVER 89
LOS: 2 days | Discharge: HOME | DRG: 372 | End: 2025-02-16
Attending: EMERGENCY MEDICINE | Admitting: EMERGENCY MEDICINE
Payer: MEDICARE

## 2025-02-14 DIAGNOSIS — D62 ACUTE BLOOD LOSS ANEMIA: ICD-10-CM

## 2025-02-14 DIAGNOSIS — K62.5 RECTAL BLEEDING: Primary | ICD-10-CM

## 2025-02-14 DIAGNOSIS — K51.00 PANCOLITIS: ICD-10-CM

## 2025-02-14 LAB
ABORH RETYPE: NORMAL
ALBUMIN SERPL-MCNC: 2.5 G/DL (ref 3.4–4.8)
ALBUMIN/GLOB SERPL: 1.1 RATIO (ref 1.1–2)
ALP SERPL-CCNC: 35 UNIT/L (ref 40–150)
ALT SERPL-CCNC: 11 UNIT/L (ref 0–55)
ANION GAP SERPL CALC-SCNC: 8 MEQ/L
APTT PPP: 36.2 SECONDS (ref 24.2–35.9)
AST SERPL-CCNC: 17 UNIT/L (ref 5–34)
BASOPHILS # BLD AUTO: 0.04 X10(3)/MCL
BASOPHILS NFR BLD AUTO: 0.5 %
BILIRUB SERPL-MCNC: 0.6 MG/DL
BUN SERPL-MCNC: 18 MG/DL (ref 9.8–20.1)
C DIFF TOX A+B STL QL IA: POSITIVE
CALCIUM SERPL-MCNC: 8.1 MG/DL (ref 8.4–10.2)
CHLORIDE SERPL-SCNC: 106 MMOL/L (ref 98–107)
CLOSTRIDIUM DIFFICILE GDH ANTIGEN (OHS): POSITIVE
CO2 SERPL-SCNC: 27 MMOL/L (ref 23–31)
CREAT SERPL-MCNC: 0.99 MG/DL (ref 0.55–1.02)
CREAT/UREA NIT SERPL: 18
EOSINOPHIL # BLD AUTO: 0.14 X10(3)/MCL (ref 0–0.9)
EOSINOPHIL NFR BLD AUTO: 1.7 %
ERYTHROCYTE [DISTWIDTH] IN BLOOD BY AUTOMATED COUNT: 15.5 % (ref 11.5–17)
GFR SERPLBLD CREATININE-BSD FMLA CKD-EPI: 55 ML/MIN/1.73/M2
GLOBULIN SER-MCNC: 2.3 GM/DL (ref 2.4–3.5)
GLUCOSE SERPL-MCNC: 178 MG/DL (ref 82–115)
GROUP & RH: NORMAL
HCT VFR BLD AUTO: 31 % (ref 37–47)
HCT VFR BLD AUTO: 32.3 % (ref 37–47)
HGB BLD-MCNC: 10.1 G/DL (ref 12–16)
HGB BLD-MCNC: 10.5 G/DL (ref 12–16)
IMM GRANULOCYTES # BLD AUTO: 0.02 X10(3)/MCL (ref 0–0.04)
IMM GRANULOCYTES NFR BLD AUTO: 0.2 %
INDIRECT COOMBS: NORMAL
INR PPP: 1.8
LYMPHOCYTES # BLD AUTO: 0.88 X10(3)/MCL (ref 0.6–4.6)
LYMPHOCYTES NFR BLD AUTO: 10.8 %
MCH RBC QN AUTO: 27.8 PG (ref 27–31)
MCHC RBC AUTO-ENTMCNC: 32.6 G/DL (ref 33–36)
MCV RBC AUTO: 85.4 FL (ref 80–94)
MONOCYTES # BLD AUTO: 0.57 X10(3)/MCL (ref 0.1–1.3)
MONOCYTES NFR BLD AUTO: 7 %
NEUTROPHILS # BLD AUTO: 6.51 X10(3)/MCL (ref 2.1–9.2)
NEUTROPHILS NFR BLD AUTO: 79.8 %
NRBC BLD AUTO-RTO: 0 %
PLATELET # BLD AUTO: 259 X10(3)/MCL (ref 130–400)
PMV BLD AUTO: 11.2 FL (ref 7.4–10.4)
POCT GLUCOSE: 321 MG/DL (ref 70–110)
POTASSIUM SERPL-SCNC: 3 MMOL/L (ref 3.5–5.1)
PROT SERPL-MCNC: 4.8 GM/DL (ref 5.8–7.6)
PROTHROMBIN TIME: 21.6 SECONDS (ref 12.4–14.9)
RBC # BLD AUTO: 3.63 X10(6)/MCL (ref 4.2–5.4)
SODIUM SERPL-SCNC: 141 MMOL/L (ref 136–145)
SPECIMEN OUTDATE: NORMAL
WBC # BLD AUTO: 8.16 X10(3)/MCL (ref 4.5–11.5)

## 2025-02-14 PROCEDURE — 99285 EMERGENCY DEPT VISIT HI MDM: CPT | Mod: 25

## 2025-02-14 PROCEDURE — 87045 FECES CULTURE AEROBIC BACT: CPT | Performed by: SURGERY

## 2025-02-14 PROCEDURE — 25000003 PHARM REV CODE 250: Performed by: EMERGENCY MEDICINE

## 2025-02-14 PROCEDURE — 25500020 PHARM REV CODE 255: Performed by: EMERGENCY MEDICINE

## 2025-02-14 PROCEDURE — 85610 PROTHROMBIN TIME: CPT | Performed by: EMERGENCY MEDICINE

## 2025-02-14 PROCEDURE — 96365 THER/PROPH/DIAG IV INF INIT: CPT

## 2025-02-14 PROCEDURE — 85730 THROMBOPLASTIN TIME PARTIAL: CPT | Performed by: EMERGENCY MEDICINE

## 2025-02-14 PROCEDURE — 27000207 HC ISOLATION

## 2025-02-14 PROCEDURE — 80053 COMPREHEN METABOLIC PANEL: CPT | Performed by: EMERGENCY MEDICINE

## 2025-02-14 PROCEDURE — 63600175 PHARM REV CODE 636 W HCPCS: Mod: JZ,TB | Performed by: EMERGENCY MEDICINE

## 2025-02-14 PROCEDURE — 85025 COMPLETE CBC W/AUTO DIFF WBC: CPT | Performed by: EMERGENCY MEDICINE

## 2025-02-14 PROCEDURE — 86901 BLOOD TYPING SEROLOGIC RH(D): CPT | Performed by: EMERGENCY MEDICINE

## 2025-02-14 PROCEDURE — 86318 IA INFECTIOUS AGENT ANTIBODY: CPT | Performed by: SURGERY

## 2025-02-14 PROCEDURE — 96375 TX/PRO/DX INJ NEW DRUG ADDON: CPT

## 2025-02-14 PROCEDURE — 63600175 PHARM REV CODE 636 W HCPCS: Performed by: EMERGENCY MEDICINE

## 2025-02-14 PROCEDURE — 94761 N-INVAS EAR/PLS OXIMETRY MLT: CPT

## 2025-02-14 PROCEDURE — 85014 HEMATOCRIT: CPT | Performed by: EMERGENCY MEDICINE

## 2025-02-14 PROCEDURE — 36415 COLL VENOUS BLD VENIPUNCTURE: CPT | Performed by: EMERGENCY MEDICINE

## 2025-02-14 PROCEDURE — 96368 THER/DIAG CONCURRENT INF: CPT

## 2025-02-14 PROCEDURE — 11000001 HC ACUTE MED/SURG PRIVATE ROOM

## 2025-02-14 RX ORDER — ONDANSETRON HYDROCHLORIDE 2 MG/ML
4 INJECTION, SOLUTION INTRAVENOUS EVERY 8 HOURS PRN
Status: DISCONTINUED | OUTPATIENT
Start: 2025-02-14 | End: 2025-02-16 | Stop reason: HOSPADM

## 2025-02-14 RX ORDER — GLUCAGON 1 MG
1 KIT INJECTION
Status: DISCONTINUED | OUTPATIENT
Start: 2025-02-14 | End: 2025-02-16 | Stop reason: HOSPADM

## 2025-02-14 RX ORDER — LISINOPRIL 10 MG/1
10 TABLET ORAL DAILY
Status: DISCONTINUED | OUTPATIENT
Start: 2025-02-15 | End: 2025-02-16 | Stop reason: HOSPADM

## 2025-02-14 RX ORDER — IBUPROFEN 200 MG
24 TABLET ORAL
Status: DISCONTINUED | OUTPATIENT
Start: 2025-02-14 | End: 2025-02-16 | Stop reason: HOSPADM

## 2025-02-14 RX ORDER — MEMANTINE HYDROCHLORIDE 5 MG/1
5 TABLET ORAL 2 TIMES DAILY
Status: DISCONTINUED | OUTPATIENT
Start: 2025-02-14 | End: 2025-02-16 | Stop reason: HOSPADM

## 2025-02-14 RX ORDER — CARVEDILOL 12.5 MG/1
12.5 TABLET ORAL 2 TIMES DAILY
Status: DISCONTINUED | OUTPATIENT
Start: 2025-02-14 | End: 2025-02-16 | Stop reason: HOSPADM

## 2025-02-14 RX ORDER — TALC
6 POWDER (GRAM) TOPICAL NIGHTLY PRN
Status: DISCONTINUED | OUTPATIENT
Start: 2025-02-14 | End: 2025-02-16 | Stop reason: HOSPADM

## 2025-02-14 RX ORDER — INSULIN ASPART 100 [IU]/ML
0-10 INJECTION, SOLUTION INTRAVENOUS; SUBCUTANEOUS
Status: DISCONTINUED | OUTPATIENT
Start: 2025-02-14 | End: 2025-02-16 | Stop reason: HOSPADM

## 2025-02-14 RX ORDER — DEXAMETHASONE SODIUM PHOSPHATE 4 MG/ML
8 INJECTION, SOLUTION INTRA-ARTICULAR; INTRALESIONAL; INTRAMUSCULAR; INTRAVENOUS; SOFT TISSUE
Status: COMPLETED | OUTPATIENT
Start: 2025-02-14 | End: 2025-02-14

## 2025-02-14 RX ORDER — LANOLIN ALCOHOL/MO/W.PET/CERES
400 CREAM (GRAM) TOPICAL DAILY
COMMUNITY

## 2025-02-14 RX ORDER — PANTOPRAZOLE SODIUM 40 MG/1
40 TABLET, DELAYED RELEASE ORAL DAILY
Status: DISCONTINUED | OUTPATIENT
Start: 2025-02-15 | End: 2025-02-16 | Stop reason: HOSPADM

## 2025-02-14 RX ORDER — CIPROFLOXACIN 2 MG/ML
400 INJECTION, SOLUTION INTRAVENOUS
Status: DISCONTINUED | OUTPATIENT
Start: 2025-02-15 | End: 2025-02-15

## 2025-02-14 RX ORDER — MIDODRINE HYDROCHLORIDE 5 MG/1
10 TABLET ORAL EVERY 12 HOURS
Status: DISCONTINUED | OUTPATIENT
Start: 2025-02-14 | End: 2025-02-16 | Stop reason: HOSPADM

## 2025-02-14 RX ORDER — IBUPROFEN 200 MG
16 TABLET ORAL
Status: DISCONTINUED | OUTPATIENT
Start: 2025-02-14 | End: 2025-02-16 | Stop reason: HOSPADM

## 2025-02-14 RX ORDER — CIPROFLOXACIN 2 MG/ML
400 INJECTION, SOLUTION INTRAVENOUS
Status: COMPLETED | OUTPATIENT
Start: 2025-02-14 | End: 2025-02-14

## 2025-02-14 RX ORDER — FUROSEMIDE 20 MG/1
20 TABLET ORAL DAILY
COMMUNITY

## 2025-02-14 RX ORDER — DONEPEZIL HYDROCHLORIDE 5 MG/1
5 TABLET, FILM COATED ORAL NIGHTLY
Status: DISCONTINUED | OUTPATIENT
Start: 2025-02-14 | End: 2025-02-16 | Stop reason: HOSPADM

## 2025-02-14 RX ORDER — POTASSIUM CHLORIDE 20 MEQ/1
20 TABLET, EXTENDED RELEASE ORAL 2 TIMES DAILY
Status: DISCONTINUED | OUTPATIENT
Start: 2025-02-14 | End: 2025-02-16 | Stop reason: HOSPADM

## 2025-02-14 RX ORDER — METHYLPREDNISOLONE SOD SUCC 125 MG
125 VIAL (EA) INJECTION EVERY 8 HOURS
Status: DISCONTINUED | OUTPATIENT
Start: 2025-02-14 | End: 2025-02-15

## 2025-02-14 RX ORDER — METRONIDAZOLE 500 MG/100ML
500 INJECTION, SOLUTION INTRAVENOUS
Status: DISCONTINUED | OUTPATIENT
Start: 2025-02-14 | End: 2025-02-15

## 2025-02-14 RX ORDER — SERTRALINE HYDROCHLORIDE 50 MG/1
100 TABLET, FILM COATED ORAL DAILY
Status: DISCONTINUED | OUTPATIENT
Start: 2025-02-15 | End: 2025-02-16 | Stop reason: HOSPADM

## 2025-02-14 RX ORDER — IOPAMIDOL 755 MG/ML
100 INJECTION, SOLUTION INTRAVASCULAR
Status: COMPLETED | OUTPATIENT
Start: 2025-02-14 | End: 2025-02-14

## 2025-02-14 RX ORDER — METRONIDAZOLE 500 MG/100ML
500 INJECTION, SOLUTION INTRAVENOUS
Status: COMPLETED | OUTPATIENT
Start: 2025-02-14 | End: 2025-02-14

## 2025-02-14 RX ORDER — SODIUM CHLORIDE 0.9 % (FLUSH) 0.9 %
10 SYRINGE (ML) INJECTION
Status: DISCONTINUED | OUTPATIENT
Start: 2025-02-14 | End: 2025-02-16 | Stop reason: HOSPADM

## 2025-02-14 RX ORDER — SULFASALAZINE 500 MG/1
500 TABLET, DELAYED RELEASE ORAL 3 TIMES DAILY
Status: DISCONTINUED | OUTPATIENT
Start: 2025-02-14 | End: 2025-02-16 | Stop reason: HOSPADM

## 2025-02-14 RX ORDER — MIDODRINE HYDROCHLORIDE 10 MG/1
10 TABLET ORAL EVERY 12 HOURS
COMMUNITY

## 2025-02-14 RX ORDER — ACETAMINOPHEN 325 MG/1
650 TABLET ORAL EVERY 4 HOURS PRN
Status: DISCONTINUED | OUTPATIENT
Start: 2025-02-14 | End: 2025-02-16 | Stop reason: HOSPADM

## 2025-02-14 RX ADMIN — POTASSIUM CHLORIDE 20 MEQ: 1500 TABLET, EXTENDED RELEASE ORAL at 09:02

## 2025-02-14 RX ADMIN — METHYLPREDNISOLONE SODIUM SUCCINATE 125 MG: 125 INJECTION, POWDER, FOR SOLUTION INTRAMUSCULAR; INTRAVENOUS at 04:02

## 2025-02-14 RX ADMIN — CIPROFLOXACIN 400 MG: 400 INJECTION, SOLUTION INTRAVENOUS at 12:02

## 2025-02-14 RX ADMIN — SODIUM CHLORIDE, POTASSIUM CHLORIDE, SODIUM LACTATE AND CALCIUM CHLORIDE 1000 ML: 600; 310; 30; 20 INJECTION, SOLUTION INTRAVENOUS at 09:02

## 2025-02-14 RX ADMIN — CIPROFLOXACIN 400 MG: 2 INJECTION, SOLUTION INTRAVENOUS at 11:02

## 2025-02-14 RX ADMIN — METHYLPREDNISOLONE SODIUM SUCCINATE 125 MG: 125 INJECTION, POWDER, FOR SOLUTION INTRAMUSCULAR; INTRAVENOUS at 09:02

## 2025-02-14 RX ADMIN — MEMANTINE HYDROCHLORIDE 5 MG: 5 TABLET ORAL at 09:02

## 2025-02-14 RX ADMIN — VANCOMYCIN HYDROCHLORIDE 125 MG: KIT at 09:02

## 2025-02-14 RX ADMIN — MIDODRINE HYDROCHLORIDE 10 MG: 5 TABLET ORAL at 09:02

## 2025-02-14 RX ADMIN — SULFASALAZINE 500 MG: 500 TABLET, DELAYED RELEASE ORAL at 09:02

## 2025-02-14 RX ADMIN — METRONIDAZOLE 500 MG: 500 INJECTION, SOLUTION INTRAVENOUS at 09:02

## 2025-02-14 RX ADMIN — DEXAMETHASONE SODIUM PHOSPHATE 8 MG: 4 INJECTION, SOLUTION INTRA-ARTICULAR; INTRALESIONAL; INTRAMUSCULAR; INTRAVENOUS; SOFT TISSUE at 10:02

## 2025-02-14 RX ADMIN — IOPAMIDOL 100 ML: 755 INJECTION, SOLUTION INTRAVENOUS at 11:02

## 2025-02-14 RX ADMIN — METRONIDAZOLE 500 MG: 5 INJECTION, SOLUTION INTRAVENOUS at 12:02

## 2025-02-14 RX ADMIN — DONEPEZIL HYDROCHLORIDE 5 MG: 5 TABLET ORAL at 09:02

## 2025-02-14 RX ADMIN — INSULIN ASPART 8 UNITS: 100 INJECTION, SOLUTION INTRAVENOUS; SUBCUTANEOUS at 09:02

## 2025-02-14 NOTE — H&P
[facility]  Medical Surgical Unit    History & Physical      Patient Name: Denny Mai  MRN: [id number]  Admission Date: 2/14/2025  Attending Physician: Joss Barker MD   Primary Care Provider: Asad Maya MD         Patient information was obtained from patient, relative(s), and ER records.     Subjective:     Principal Problem:Rectal bleeding    Chief Complaint:   Chief Complaint   Patient presents with    Rectal Bleeding     Pt sent from [facility] for rectal bleeding, nurse reports bright red blood noticed today.         HPI:   Ms. Mai nursing home resident referred to the ED earlier today after having 3-4 bloody bowel movements.  These have occurred over the last 24 hours.  Evaluation in the ED revealed a drop in her H&H from 11.8/361 in December 2 10.1/31 today.  She is on apixaban due to a history of AFib.  She also has a history Crohn's disease and ulcerative colitis diagnosed some years ago.  She was previously followed by Dr. Dietz in [place], [place] however since his [facility] has not seen anyone.  She was scheduled to see someone last December however was in the hospital at that time.  She does have a scheduled follow-up in 3 days with 1 of his associates.  Other significant findings in the ED workup included hypokalemia with serum potassium 3.0.  CT abdomen/pelvis revealed evidence of pancolitis.  Her history was primarily provided by her daughter who was at bedside.  In further discussing diagnoses of Crohn's disease and ulcerative colitis, the patient has been managed primarily with mesalamine and budesonide in the past but not on budesonide at this time.  She does not recall when the patient last had endoscopy.  Apparently Ms. Mai has had chronic diarrhea since she was in the hospital in December last year.  I cared for her at that time and treated her as an infectious gastroenteritis.  She was managed with Cipro/Flagyl, GI PCR panel was positive for  "sapovirus (related to norovirus) and should have been self-limited.  With her history of inflammatory bowel disease, persistent diarrhea and now bloody diarrhea I think she would most benefit from gastroenterology follow-up and endoscopy.  To manage her acute issues while in the hospital we will trend H&H, hold anticoagulant and attempt a pulse of high-dose corticosteroid.    Past Medical History:   Diagnosis Date    Anemia, unspecified     CAD (coronary artery disease)     Chronic systolic HF (heart failure)     Crohn disease     Dementia     DM (diabetes mellitus)     Hemorrhoids     History of coronary angioplasty with insertion of stent     HLD (hyperlipidemia)     HTN (hypertension)     Memory loss     Paroxysmal atrial fibrillation        Past Surgical History:   Procedure Laterality Date    CHOLECYSTECTOMY      VENTRICULAR CARDIAC PACEMAKER INSERTION         Review of patient's allergies indicates:   Allergen Reactions    Centratex [iron-folic acid-mv, min cmb#15]     Cephalexin     Erythromycin     Hydroxyzine     Iodinated contrast media     Iodine     Naldecon dx     Penicillins Other (See Comments)     unknown    Tetanus vaccines and toxoid     Vioxx [rofecoxib]        No current facility-administered medications on file prior to encounter.     Current Outpatient Medications on File Prior to Encounter   Medication Sig    ACCU-CHEK SMARTVIEW TEST STRIP Strp     B-complex with vitamin C (Z-BEC OR EQUIV) tablet Take 1 tablet by mouth once daily.    budesonide (ENTOCORT EC) 3 mg capsule Take 3 mg by mouth once daily.    carvediloL (COREG) 12.5 MG tablet Take 12.5 mg by mouth 2 (two) times a day.    cholecalciferol, vitamin D3, (VITAMIN D3) 50 mcg (2,000 unit) Cap capsule Take 2,000 Units by mouth once daily.    donepeziL (ARICEPT) 5 MG tablet Take 1 tablet (5 mg total) by mouth every evening.    DROPLET INSULIN SYRINGE 0.3 mL 31 gauge x 5/16" Syrg     ELIQUIS 5 mg Tab Take 5 mg by mouth 2 (two) times daily. "    fenofibrate 160 MG Tab Take 160 mg by mouth once daily.    ferrous sulfate (FEOSOL) Tab tablet Take 1 tablet by mouth once daily.    furosemide (LASIX) 20 MG tablet Take 20 mg by mouth once daily.    insulin glargine U-100, Lantus, (LANTUS SOLOSTAR U-100 INSULIN) 100 unit/mL (3 mL) InPn pen Inject 10 Units into the skin every evening.    lisinopriL 10 MG tablet Take 10 mg by mouth once daily.    memantine (NAMENDA) 5 MG Tab Take 1 tablet (5 mg total) by mouth 2 (two) times daily.    mesalamine (PENTASA) 500 MG CR capsule Take 500 mg by mouth 3 (three) times daily.    midodrine (PROAMATINE) 10 MG tablet Take 10 mg by mouth every 12 (twelve) hours.    NOVOLIN R FLEXPEN 100 unit/mL (3 mL) InPn pen     ondansetron (ZOFRAN) 4 MG tablet Take 1 tablet (4 mg total) by mouth every 6 (six) hours. (Patient taking differently: Take 4 mg by mouth every 6 (six) hours as needed for Nausea.)    pantoprazole (PROTONIX) 40 MG tablet Take 40 mg by mouth once daily.    sertraline (ZOLOFT) 100 MG tablet Take 100 mg by mouth once daily.    simethicone (MYLICON) 125 MG chewable tablet Take 125 mg by mouth every 4 (four) hours as needed for Flatulence.    spironolactone (ALDACTONE) 25 MG tablet Take 25 mg by mouth once daily.    TRULICITY 0.75 mg/0.5 mL pen injector Inject 0.75 mg into the skin every 7 days. Every friday    insulin asp prt-insulin aspart, NovoLOG 70/30, (NOVOLOG MIX 70-30 U-100 INSULN) 100 unit/mL (70-30) Soln Inject 10 Units into the skin 2 (two) times daily.    insulin regular 100 unit/mL Inj injection Inject into the skin 4 (four) times daily as needed for High Blood Sugar.    magnesium oxide (MAG-OX) 400 mg (241.3 mg magnesium) tablet Take 400 mg by mouth once daily.    NON FORMULARY MEDICATION Inject 12.5 mg into the muscle every 8 (eight) hours as needed (Nausea and vomiting). Promethazine 12.5mg IM    omega-3 fatty acids-fish oil 745-1,200 mg CpDR Take 1 capsule by mouth once daily.    [DISCONTINUED] quinapriL  (ACCUPRIL) 20 MG tablet      Family History    Family history is unknown by patient.       Tobacco Use    Smoking status: Never    Smokeless tobacco: Never   Substance and Sexual Activity    Alcohol use: Never    Drug use: Never    Sexual activity: Not on file     Review of Systems   Constitutional: Negative.    HENT: Negative.     Eyes: Negative.    Respiratory: Negative.     Cardiovascular: Negative.    Gastrointestinal:  Positive for blood in stool and diarrhea.   Genitourinary: Negative.    Musculoskeletal: Negative.    Neurological: Negative.    Psychiatric/Behavioral: Negative.       Objective:     Vital Signs (Most Recent):  Temp: 98.6 °F (37 °C) (02/14/25 1527)  Pulse: 72 (02/14/25 1527)  Resp: 16 (02/14/25 0939)  BP: 108/69 (02/14/25 1527)  SpO2: (!) 94 % (02/14/25 1527) Vital Signs (24h Range):  Temp:  [98.6 °F (37 °C)] 98.6 °F (37 °C)  Pulse:  [] 72  Resp:  [16] 16  SpO2:  [91 %-98 %] 94 %  BP: ()/(42-69) 108/69     Weight: 86.2 kg (190 lb)  Body mass index is 28.89 kg/m².    Physical exam  Constitution-well nourished, normally developed female in NAD  Eyes-PERRL, EOMI  HENT-normocephalic, atraumatic; external ears appear normal; moist mucous membranes  Neck-supple  Respiratory-normal respirations; CTA with good air movement  Heart-RRR; normal S1 and S2; no murmurs, gallops  Abdomen-soft, nontender, nondistended; active bowel sounds  Genitourinary-deferred  Musculoskeletal-no joint abnormalities, normal ROM throughout; trace pedal edema bilaterally  Skin-warm, dry; no rashes  Neurologic-alert and oriented to person and place, mildly confused; nonfocal exam    Scheduled Meds:   carvediloL  12.5 mg Oral BID    [START ON 2/15/2025] ciprofloxacin  400 mg Intravenous Q12H    donepeziL  5 mg Oral QHS    [START ON 2/15/2025] lisinopriL  10 mg Oral Daily    memantine  5 mg Oral BID    methylPREDNISolone injection (PEDS and ADULTS)  125 mg Intravenous Q8H    metroNIDAZOLE IV (PEDS and ADULTS)  500 mg  "Intravenous Q8H    midodrine  10 mg Oral Q12H    [START ON 2/15/2025] pantoprazole  40 mg Oral Daily    [START ON 2/15/2025] sertraline  100 mg Oral Daily    sulfaSALAzine  500 mg Oral TID     Continuous Infusions:  PRN Meds:.  Current Facility-Administered Medications:     acetaminophen, 650 mg, Oral, Q4H PRN    melatonin, 6 mg, Oral, Nightly PRN    ondansetron, 4 mg, Intravenous, Q8H PRN    sodium chloride 0.9%, 10 mL, Intravenous, PRN      Significant Labs: All pertinent labs within the past 24 hours have been reviewed.  CBC:   Recent Labs   Lab 02/14/25  0958 02/14/25  1605   WBC 8.16  --    HGB 10.1* 10.5*   HCT 31.0* 32.3*     --      CMP:   Recent Labs   Lab 02/14/25  0958      K 3.0*      CO2 27   BUN 18.0   CREATININE 0.99   CALCIUM 8.1*   ALBUMIN 2.5*   BILITOT 0.6   ALKPHOS 35*   AST 17   ALT 11     Coagulation:   Recent Labs   Lab 02/14/25  0958   INR 1.8*   APTT 36.2*     Lactic Acid: No results for input(s): "LACTATE" in the last 48 hours.  Magnesium: No results for input(s): "MG" in the last 48 hours.  POCT Glucose: No results for input(s): "POCTGLUCOSE" in the last 48 hours.  Urine Studies: No results for input(s): "COLORU", "APPEARANCEUA", "PHUR", "SPECGRAV", "PROTEINUA", "GLUCUA", "KETONESU", "BILIRUBINUA", "OCCULTUA", "NITRITE", "UROBILINOGEN", "LEUKOCYTESUR", "RBCUA", "WBCUA", "BACTERIA", "SQUAMEPITHEL", "HYALINECASTS" in the last 48 hours.    Invalid input(s): "WRIGHTSUR"    Significant Imaging:   CT abdomen/pelvis  IMPRESSION  1. No CT evidence of active GI tract hemorrhage.  2. Findings suggestive of pancolitis, would be favored infectious or inflammatory etiology.  3. No other convincing acute process or significant interval change.     Assessment/Plan:   Rectal bleeding  CT abdomen/pelvis reveals evidence of pancolitis and with patient's history of IBD suspect this to be the etiology.  Stool studies have been ordered for GI PCR panel, C diff.  The patient was started on " empiric antibiotics with Cipro/metronidazole in the ED which will be continued.  Trend H&H  Attempt pulse of corticosteroid, transitioned to oral prednisone, continue sulfasalazine (hospital substitute for mesalamine)  Continue PPI  Check CRP  She has follow-up appointment with Gastroenterology on Monday, 2/17    History of AFib/ppm status  Currently in paced rhythm  She is s/p ablation therapy some years ago but continued on OAC with apixaban  In face of GI bleeding we will hold apixaban for now  She is followed by Dr. Valentino in [place], [place]    Dementia  Continue routine medication    Hypokalemia  Supplement as necessary    Diabetes mellitus  Monitor blood sugars with Accu-Cheks and SSI   Add basal insulin as necessary    Hypertension  Continue beta-blocker and ACE inhibitor; hold spironolactone  and furosemide for now    Chronic systolic heart failure  Compensated  GDMT-beta-blocker, ACEI, spironolactone  No echo available for review    Code status:  Full    Active Diagnoses:    Diagnosis Date Noted POA    PRINCIPAL PROBLEM:  Rectal bleeding [K62.5] 02/14/2025 Yes      Problems Resolved During this Admission:     VTE Risk Mitigation (From admission, onward)           Ordered     IP VTE HIGH RISK PATIENT  Once         02/14/25 1424     Place sequential compression device  Until discontinued         02/14/25 1424     Reason for No Pharmacological VTE Prophylaxis  Once        Question:  Reasons:  Answer:  Active Bleeding    02/14/25 1424                      Joss Barker MD  Department of Hospital Medicine   [facility] - Medical Surgical Unit

## 2025-02-14 NOTE — CONSULTS
[facility] - Medical Surgical Unit  Hospital Medicine  Consult Note    Patient Name: Denny Mai  MRN: [id number]  Admission Date: 2/14/2025  Hospital Length of Stay: 0 days  Attending Physician: Joss Barker MD   Primary Care Provider: Asad Maya MD           Patient information was obtained from patient and ER records.     Consults  Subjective:     Principal Problem:<principal problem not specified>    Chief Complaint:   Chief Complaint   Patient presents with    Rectal Bleeding     Pt sent from [facility] for rectal bleeding, nurse reports bright red blood noticed today.         HPI: 89F with diarrhea followed by blood per rectum and some mild abdominal pain; ho afib on eliquis, chronic systolic heart failure, dementia, DM, Crohn's disease, hemorrhoids, coronary angioplasty with stent placement, hyperlipidemia, hypertension.  Patient notes that she started to pass diarrhea 3 days ago.  Yesterday, she started to pass blood.  She developed abdominal pain to the mid and left mid to lower abdomen.  She was brought from the [facility] [place] to [facility].  CT suggestive of pancolitis with small blood noted per rectum per ER staff.  Surgery is consulted for assistance with management.     When I spoke with the patient, she did not note any medical history.  She denies any h/o colonoscopy.  Reading through the chart, it seems she has a history of Crohn's disease?  She also was diagnosed with dementia about 1-2 years prior.  Looking through the cahrt, it seems she was admitted for n/v/d in December, 2024.  She had dizziness, as well, at that time.  She came to the ER in October, 2024, for a fall.  She was admitted in 2023 with a fall, as well.  She had another fall in 2022.      Past Medical History:   Diagnosis Date    Anemia, unspecified     CAD (coronary artery disease)     Chronic systolic HF (heart failure)     Crohn disease     Dementia     DM (diabetes mellitus)     Hemorrhoids     History  "of coronary angioplasty with insertion of stent     HLD (hyperlipidemia)     HTN (hypertension)     Memory loss     Paroxysmal atrial fibrillation        Past Surgical History:   Procedure Laterality Date    CHOLECYSTECTOMY      VENTRICULAR CARDIAC PACEMAKER INSERTION         Review of patient's allergies indicates:   Allergen Reactions    Centratex [iron-folic acid-mv, min cmb#15]     Cephalexin     Erythromycin     Hydroxyzine     Iodinated contrast media     Iodine     Naldecon dx     Penicillins Other (See Comments)     unknown    Tetanus vaccines and toxoid     Vioxx [rofecoxib]        No current facility-administered medications on file prior to encounter.     Current Outpatient Medications on File Prior to Encounter   Medication Sig    ACCU-CHEK SMARTVIEW TEST STRIP Strp     B-complex with vitamin C (Z-BEC OR EQUIV) tablet Take 1 tablet by mouth once daily.    budesonide (ENTOCORT EC) 3 mg capsule Take 3 mg by mouth once daily.    carvediloL (COREG) 12.5 MG tablet Take 12.5 mg by mouth 2 (two) times a day.    cholecalciferol, vitamin D3, (VITAMIN D3) 50 mcg (2,000 unit) Cap capsule Take 2,000 Units by mouth once daily.    donepeziL (ARICEPT) 5 MG tablet Take 1 tablet (5 mg total) by mouth every evening.    DROPLET INSULIN SYRINGE 0.3 mL 31 gauge x 5/16" Syrg     ELIQUIS 5 mg Tab Take 5 mg by mouth 2 (two) times daily.    fenofibrate 160 MG Tab Take 160 mg by mouth once daily.    ferrous sulfate (FEOSOL) Tab tablet Take 1 tablet by mouth once daily.    furosemide (LASIX) 20 MG tablet Take 20 mg by mouth once daily.    insulin glargine U-100, Lantus, (LANTUS SOLOSTAR U-100 INSULIN) 100 unit/mL (3 mL) InPn pen Inject 10 Units into the skin every evening.    lisinopriL 10 MG tablet Take 10 mg by mouth once daily.    memantine (NAMENDA) 5 MG Tab Take 1 tablet (5 mg total) by mouth 2 (two) times daily.    mesalamine (PENTASA) 500 MG CR capsule Take 500 mg by mouth 3 (three) times daily.    midodrine (PROAMATINE) 10 " MG tablet Take 10 mg by mouth every 12 (twelve) hours.    NOVOLIN R FLEXPEN 100 unit/mL (3 mL) InPn pen     ondansetron (ZOFRAN) 4 MG tablet Take 1 tablet (4 mg total) by mouth every 6 (six) hours. (Patient taking differently: Take 4 mg by mouth every 6 (six) hours as needed for Nausea.)    pantoprazole (PROTONIX) 40 MG tablet Take 40 mg by mouth once daily.    sertraline (ZOLOFT) 100 MG tablet Take 100 mg by mouth once daily.    simethicone (MYLICON) 125 MG chewable tablet Take 125 mg by mouth every 4 (four) hours as needed for Flatulence.    spironolactone (ALDACTONE) 25 MG tablet Take 25 mg by mouth once daily.    TRULICITY 0.75 mg/0.5 mL pen injector Inject 0.75 mg into the skin every 7 days. Every friday    insulin asp prt-insulin aspart, NovoLOG 70/30, (NOVOLOG MIX 70-30 U-100 INSULN) 100 unit/mL (70-30) Soln Inject 10 Units into the skin 2 (two) times daily.    insulin regular 100 unit/mL Inj injection Inject into the skin 4 (four) times daily as needed for High Blood Sugar.    magnesium oxide (MAG-OX) 400 mg (241.3 mg magnesium) tablet Take 400 mg by mouth once daily.    NON FORMULARY MEDICATION Inject 12.5 mg into the muscle every 8 (eight) hours as needed (Nausea and vomiting). Promethazine 12.5mg IM    omega-3 fatty acids-fish oil 684-1,200 mg CpDR Take 1 capsule by mouth once daily.    [DISCONTINUED] quinapriL (ACCUPRIL) 20 MG tablet      Family History    Family history is unknown by patient.       Tobacco Use    Smoking status: Never    Smokeless tobacco: Never   Substance and Sexual Activity    Alcohol use: Never    Drug use: Never    Sexual activity: Not on file     Review of Systems   Constitutional:  Negative for activity change, appetite change, chills and fever.   HENT: Negative.     Eyes: Negative.    Respiratory: Negative.     Cardiovascular: Negative.    Gastrointestinal:  Positive for abdominal pain, blood in stool and diarrhea.   Endocrine: Negative.    Genitourinary: Negative.     Musculoskeletal: Negative.    Skin: Negative.    Allergic/Immunologic: Negative.    Neurological: Negative.    Psychiatric/Behavioral: Negative.       Objective:     Vital Signs (Most Recent):  Pulse: 70 (02/14/25 1431)  Resp: 16 (02/14/25 0939)  BP: 116/68 (02/14/25 1402)  SpO2: (!) 94 % (02/14/25 1407) Vital Signs (24h Range):  Pulse:  [] 70  Resp:  [16] 16  SpO2:  [91 %-98 %] 94 %  BP: ()/(42-68) 116/68     Weight: 86.2 kg (190 lb)  Body mass index is 28.89 kg/m².    Physical Exam  Constitutional:       General: She is not in acute distress.     Appearance: She is not toxic-appearing.   HENT:      Head: Normocephalic and atraumatic.      Right Ear: External ear normal.      Left Ear: External ear normal.      Nose: Nose normal.      Mouth/Throat:      Mouth: Mucous membranes are dry.   Eyes:      General:         Right eye: No discharge.         Left eye: No discharge.      Extraocular Movements: Extraocular movements intact.   Cardiovascular:      Rate and Rhythm: Normal rate and regular rhythm.   Pulmonary:      Effort: Pulmonary effort is normal. No respiratory distress.   Abdominal:      General: There is no distension.      Palpations: Abdomen is soft.      Tenderness: There is abdominal tenderness.      Comments: Some mild tenderness noted mid abdomen and left mid to lower abdomen   Musculoskeletal:         General: Swelling present. Normal range of motion.   Skin:     General: Skin is warm and dry.   Neurological:      General: No focal deficit present.      Mental Status: She is alert and oriented to person, place, and time.   Psychiatric:         Mood and Affect: Mood normal.         Behavior: Behavior normal.         Significant Labs: All pertinent labs within the past 24 hours have been reviewed.  CBC:   Recent Labs   Lab 02/14/25  0958   WBC 8.16   HGB 10.1*   HCT 31.0*        CMP:   Recent Labs   Lab 02/14/25  0958      K 3.0*      CO2 27   BUN 18.0   CREATININE 0.99    CALCIUM 8.1*   ALBUMIN 2.5*   BILITOT 0.6   ALKPHOS 35*   AST 17   ALT 11     Coagulation:   Recent Labs   Lab 02/14/25  0958   INR 1.8*   APTT 36.2*       Significant Imaging: I have reviewed all pertinent imaging results/findings within the past 24 hours.    CT a/p image and report personally reviewed - agree she has pancolitis       COMPARISON  17 December 2024     FINDINGS  Images were reviewed in soft tissue, lung, and bone windows.     Exam quality: adequate for evaluation     Lines/tubes: Cardiac device leads are again partially visualized.     There is similar global dilatation of the heart chambers.  No development of pericardial effusion.  Widespread coronary, valvular, and aortoiliac calcification again noted.  No development of aneurysmal dilatation.  Visualized lower lung zones are without convincing acute interval change.     Similar changes of cholecystectomy.  No biliary dilatation.  Portal vein is widely patent.  No acute process or new focal abnormality identified involving the liver, pancreas, spleen, or adrenal glands.  Kidneys enhance in symmetric fashion, with no mass-like focal lesion or complex cyst.  There is no radiodense urolithiasis or findings of distal obstructive uropathy.  The urinary bladder is unremarkable.  No suspicious adnexal lesion.     The visualized lower esophagus and the stomach are of unremarkable CT appearance.  No small bowel findings to suggest high-grade mechanical obstruction.  The appendix is unremarkable.  There is newly appreciated circumferential mural thickening and pericolonic inflammatory fat stranding from the level of the cecum to the rectosigmoid colon, suggestive of diffuse colitis.  Homogeneous, benign-appearing mural lipoma at the transverse colon is unchanged in comparison.  There is no evidence of intraluminal contrast extravasation to suggest active GI hemorrhage.     No free intra-abdominal air or fluid is appreciated.  There are no drainable  collections.  No pathologic lymph node enlargement or necrotic adenopathy developed in the interval.     Body wall subcutaneous tissues and regional muscular structures are similar in comparison.  Advanced degenerative changes throughout the spinal column and bony pelvis again appreciated.  No evidence of acute or destructive osseous process is identified.     IMPRESSION  1. No CT evidence of active GI tract hemorrhage.  2. Findings suggestive of pancolitis, would be favored infectious or inflammatory etiology.  3. No other convincing acute process or significant interval change.  Chronic secondary details discussed above.  ==========  This report was flagged in Epic as abnormal.          Assessment/Plan:     There are no hospital problems to display for this patient.    VTE Risk Mitigation (From admission, onward)           Ordered     IP VTE HIGH RISK PATIENT  Once         02/14/25 1424     Place sequential compression device  Until discontinued         02/14/25 1424     Reason for No Pharmacological VTE Prophylaxis  Once        Question:  Reasons:  Answer:  Active Bleeding    02/14/25 1424                  89F with pancolitis likely causing blood per rectum - history of afib on eliquis, multiple falls over the last few years, dementia, Crohn's disease among other diagnoses.  Recommend for:  1.  Hold eliquis  2.  Cipro/flagyl  3.  Check stool for culture and c diff (ordered)  4.  Check who cardiologist is - determine true need for anticoagulation given h/o multiple falls, dementia, and now GI bleeding  5.  Due to multiple recent visits for enteritis, dehydration, and now colitis, she will need GI evaluation as an outpatient to determine what treatment she may need to prevent these episodes given h/o Crohn's disease  6. Consider colonoscopy in time if needed    I appreciate the consultation and the opportunity to be involved in Mrs. Mai's care.      Dianne Key MD  General Surgery/Surgical  Oncology  [facility] - Medical Surgical Unit

## 2025-02-14 NOTE — PLAN OF CARE
Problem: Adult Inpatient Plan of Care  Goal: Plan of Care Review  Outcome: Progressing  Goal: Patient-Specific Goal (Individualized)  Outcome: Progressing  Goal: Absence of Hospital-Acquired Illness or Injury  Outcome: Progressing  Goal: Optimal Comfort and Wellbeing  Outcome: Progressing  Goal: Readiness for Transition of Care  Outcome: Progressing     Problem: Acute Kidney Injury/Impairment  Goal: Fluid and Electrolyte Balance  Outcome: Progressing  Goal: Improved Oral Intake  Outcome: Progressing  Goal: Effective Renal Function  Outcome: Progressing     Problem: Wound  Goal: Optimal Coping  Outcome: Progressing  Goal: Optimal Functional Ability  Outcome: Progressing  Goal: Absence of Infection Signs and Symptoms  Outcome: Progressing  Goal: Improved Oral Intake  Outcome: Progressing  Goal: Optimal Pain Control and Function  Outcome: Progressing  Goal: Skin Health and Integrity  Outcome: Progressing  Goal: Optimal Wound Healing  Outcome: Progressing     Problem: Comorbidity Management  Goal: Maintenance of Heart Failure Symptom Control  Outcome: Progressing  Goal: Blood Pressure in Desired Range  Outcome: Progressing     Problem: Dysrhythmia  Goal: Normalized Cardiac Rhythm  Outcome: Progressing     Problem: Dementia Signs/Symptoms  Goal: Improved Behavioral Control (Dementia Signs/Symptoms)  Outcome: Progressing  Goal: Improved Mood Symptoms (Dementia Signs/Symptoms)  Outcome: Progressing  Goal: Optimized Cognitive Function (Dementia Signs/Symptoms)  Outcome: Progressing  Goal: Optimized Oral Intake (Dementia Signs/Symptoms)  Outcome: Progressing  Goal: Improved Sleep (Dementia Signs/Symptoms)  Outcome: Progressing  Goal: Enhanced Social or Functional Skills and Ability (Dementia Signs/Symptoms)  Outcome: Progressing     Problem: Anemia  Goal: Anemia Symptom Improvement  Outcome: Progressing     Problem: Gastrointestinal Bleeding  Goal: Optimal Coping with Acute Illness  Outcome: Progressing  Goal:  Hemostasis  Outcome: Progressing

## 2025-02-14 NOTE — ED PROVIDER NOTES
Encounter Date: 2/14/2025       History     Chief Complaint   Patient presents with    Rectal Bleeding     Pt sent from [facility] for rectal bleeding, nurse reports bright red blood noticed today.      The history is provided by the patient.   Rectal Bleeding   The current episode started today. The onset was sudden. The problem occurs occasionally. The problem has been resolved. Associated symptoms include abdominal pain and hemorrhoids. Pertinent negatives include no fever, no nausea, no vomiting, no hematuria, no chest pain and no rash.   Sent from NH for evaluation of single episode of BRBPR.  She is anticoagulated on Eliquis due to paroxysmal AF.    Review of patient's allergies indicates:   Allergen Reactions    Centratex [iron-folic acid-mv, min cmb#15]     Cephalexin     Erythromycin     Hydroxyzine     Iodinated contrast media     Iodine     Naldecon dx     Penicillins Other (See Comments)     unknown    Tetanus vaccines and toxoid     Vioxx [rofecoxib]      Past Medical History:   Diagnosis Date    Anemia, unspecified     CAD (coronary artery disease)     Chronic systolic HF (heart failure)     Crohn disease     Dementia     DM (diabetes mellitus)     Hemorrhoids     History of coronary angioplasty with insertion of stent     HLD (hyperlipidemia)     HTN (hypertension)     Memory loss     Paroxysmal atrial fibrillation      Past Surgical History:   Procedure Laterality Date    CHOLECYSTECTOMY      VENTRICULAR CARDIAC PACEMAKER INSERTION       Family History   Family history unknown: Yes     Social History     Tobacco Use    Smoking status: Never    Smokeless tobacco: Never   Substance Use Topics    Alcohol use: Never    Drug use: Never     Review of Systems   Constitutional:  Negative for fever.   HENT:  Negative for sore throat.    Respiratory:  Negative for shortness of breath.    Cardiovascular:  Negative for chest pain.   Gastrointestinal:  Positive for abdominal pain, hematochezia and hemorrhoids.  Negative for nausea and vomiting.   Genitourinary:  Negative for dysuria and hematuria.   Musculoskeletal:  Negative for back pain.   Skin:  Negative for rash.   Neurological:  Negative for weakness.   Hematological:  Does not bruise/bleed easily.       Physical Exam     Initial Vitals [02/14/25 0939]   BP Pulse Resp Temp SpO2   (!) 97/42 104 16 -- 95 %      MAP       --         Physical Exam    Nursing note and vitals reviewed.  Constitutional: She appears well-developed and well-nourished.   HENT:   Head: Normocephalic and atraumatic.   Right Ear: External ear normal.   Left Ear: External ear normal.   Eyes: Conjunctivae and EOM are normal. Pupils are equal, round, and reactive to light.   Neck: Neck supple.   Normal range of motion.  Cardiovascular:  Normal rate, regular rhythm, normal heart sounds and intact distal pulses.           Pulmonary/Chest: Breath sounds normal.   Abdominal: Abdomen is soft. Bowel sounds are normal. There is abdominal tenderness in the left lower quadrant.   Multiple external hemorrhoids with persistent oozing of bright red blood that seem to be coming from a source proximal to the hemorrhoids     There is no rebound and no guarding.   Musculoskeletal:         General: Normal range of motion.      Cervical back: Normal range of motion and neck supple.     Neurological: She is alert and oriented to person, place, and time. GCS score is 15. GCS eye subscore is 4. GCS verbal subscore is 5. GCS motor subscore is 6.   Skin: Skin is warm and dry. Capillary refill takes less than 2 seconds.   Psychiatric: She has a normal mood and affect. Her behavior is normal. Judgment and thought content normal.         ED Course   Procedures  Labs Reviewed   COMPREHENSIVE METABOLIC PANEL - Abnormal       Result Value    Sodium 141      Potassium 3.0 (*)     Chloride 106      CO2 27      Glucose 178 (*)     Blood Urea Nitrogen 18.0      Creatinine 0.99      Calcium 8.1 (*)     Protein Total 4.8 (*)      Albumin 2.5 (*)     Globulin 2.3 (*)     Albumin/Globulin Ratio 1.1      Bilirubin Total 0.6      ALP 35 (*)     ALT 11      AST 17      eGFR 55      Anion Gap 8.0      BUN/Creatinine Ratio 18     PROTIME-INR - Abnormal    PT 21.6 (*)     INR 1.8 (*)    APTT - Abnormal    PTT 36.2 (*)    CBC WITH DIFFERENTIAL - Abnormal    WBC 8.16      RBC 3.63 (*)     Hgb 10.1 (*)     Hct 31.0 (*)     MCV 85.4      MCH 27.8      MCHC 32.6 (*)     RDW 15.5      Platelet 259      MPV 11.2 (*)     Neut % 79.8      Lymph % 10.8      Mono % 7.0      Eos % 1.7      Basophil % 0.5      Imm Grans % 0.2      Neut # 6.51      Lymph # 0.88      Mono # 0.57      Eos # 0.14      Baso # 0.04      Imm Gran # 0.02      NRBC% 0.0     CBC W/ AUTO DIFFERENTIAL    Narrative:     The following orders were created for panel order CBC auto differential.  Procedure                               Abnormality         Status                     ---------                               -----------         ------                     CBC with Differential[[id number]]       Abnormal            Final result                 Please view results for these tests on the individual orders.   TYPE & SCREEN    Group & Rh A POS      Indirect Balbir GEL NEG      Specimen Outdate 02/17/2025 23:59     ABORH RETYPE    ABORH Retype A POS            Imaging Results               CT Abdomen Pelvis W Wo Contrast (Final result)  Result time 02/14/25 11:47:06      Final result by Chase Fernandes MD (02/14/25 11:47:06)                   Narrative:    EXAMINATION  CT ABDOMEN PELVIS W WO CONTRAST    CLINICAL HISTORY  GI bleed;    TECHNIQUE  Pre- and post-contrast helical-acquisition CT images were obtained and multiplanar reformats accomplished by a CT technologist at a separate workstation, pushed to PACS for physician review.    CONTRAST  *IV: ISOVUE-370, 100 mL  *Enteric: none    COMPARISON  17 December 2024    FINDINGS  Images were reviewed in soft tissue, lung, and bone  windows.    Exam quality: adequate for evaluation    Lines/tubes: Cardiac device leads are again partially visualized.    There is similar global dilatation of the heart chambers.  No development of pericardial effusion.  Widespread coronary, valvular, and aortoiliac calcification again noted.  No development of aneurysmal dilatation.  Visualized lower lung zones are without convincing acute interval change.    Similar changes of cholecystectomy.  No biliary dilatation.  Portal vein is widely patent.  No acute process or new focal abnormality identified involving the liver, pancreas, spleen, or adrenal glands.  Kidneys enhance in symmetric fashion, with no mass-like focal lesion or complex cyst.  There is no radiodense urolithiasis or findings of distal obstructive uropathy.  The urinary bladder is unremarkable.  No suspicious adnexal lesion.    The visualized lower esophagus and the stomach are of unremarkable CT appearance.  No small bowel findings to suggest high-grade mechanical obstruction.  The appendix is unremarkable.  There is newly appreciated circumferential mural thickening and pericolonic inflammatory fat stranding from the level of the cecum to the rectosigmoid colon, suggestive of diffuse colitis.  Homogeneous, benign-appearing mural lipoma at the transverse colon is unchanged in comparison.  There is no evidence of intraluminal contrast extravasation to suggest active GI hemorrhage.    No free intra-abdominal air or fluid is appreciated.  There are no drainable collections.  No pathologic lymph node enlargement or necrotic adenopathy developed in the interval.    Body wall subcutaneous tissues and regional muscular structures are similar in comparison.  Advanced degenerative changes throughout the spinal column and bony pelvis again appreciated.  No evidence of acute or destructive osseous process is identified.    IMPRESSION  1. No CT evidence of active GI tract hemorrhage.  2. Findings suggestive  of pancolitis, would be favored infectious or inflammatory etiology.  3. No other convincing acute process or significant interval change.  Chronic secondary details discussed above.  ==========  This report was flagged in Epic as abnormal.      RADIATION DOSE  Automated tube current modulation, weight-based exposure dosing, and/or iterative reconstruction technique utilized to reach lowest reasonably achievable exposure rate.    DLP: 1385 mGy*cm      Electronically signed by: Chase Fernandes  Date:    02/14/2025  Time:    11:47                                     Medications   ciprofloxacin (CIPRO)400mg/200ml D5W IVPB 400 mg (400 mg Intravenous New Bag 2/14/25 1242)   metronidazole IVPB 500 mg (500 mg Intravenous New Bag 2/14/25 1242)   lactated ringers bolus 1,000 mL (0 mLs Intravenous Stopped 2/14/25 1059)   dexAMETHasone injection 8 mg (8 mg Intravenous Given 2/14/25 1057)   iopamidoL (ISOVUE-370) injection 100 mL (100 mLs Intravenous Given 2/14/25 1107)     Medical Decision Making  Amount and/or Complexity of Data Reviewed  Labs: ordered. Decision-making details documented in ED Course.  Radiology: ordered.    Risk  Prescription drug management.    Differential includes:  hemorrhoids, diverticular disease, AVM, colitis, fissure, neoplasm.  Will obtain CBC, CMP, PT/PTT, T&S and give IVF bolus.  Consider CT imaging pending labs.           ED Course as of 02/14/25 1321   Fri Feb 14, 2025   1220 Hgb was 11.8 on 12/20, down to 10.1 today.  No active bleeding seen on CT but findings suggest diffuse colitis.  Will admit for IV antibiotics and monitoring of H&H.  Will need to hold Eliquis for the time being. [CL]   1249 I spoke with Dr. Barker (hospital medicine) regarding admission.  Surgery consult requested. [CL]   1321 I spoke with Dr. Watters (general surgery) - will see patient on the floor [CL]      ED Course User Index  [CL] Darvin Pang MD                           Clinical Impression:  Final  diagnoses:  [K51.00] Pancolitis (Primary)  [K62.5] Rectal bleeding  [D62] Acute blood loss anemia          ED Disposition Condition    Admit Stable                Darvin Pang MD  02/14/25 7404

## 2025-02-15 LAB
ANION GAP SERPL CALC-SCNC: 5 MEQ/L
BASOPHILS # BLD AUTO: 0 X10(3)/MCL
BASOPHILS NFR BLD AUTO: 0 %
BUN SERPL-MCNC: 14 MG/DL (ref 9.8–20.1)
CALCIUM SERPL-MCNC: 8.4 MG/DL (ref 8.4–10.2)
CHLORIDE SERPL-SCNC: 106 MMOL/L (ref 98–107)
CO2 SERPL-SCNC: 31 MMOL/L (ref 23–31)
CREAT SERPL-MCNC: 0.8 MG/DL (ref 0.55–1.02)
CREAT/UREA NIT SERPL: 18
CRP SERPL-MCNC: 8.2 MG/L
EOSINOPHIL # BLD AUTO: 0 X10(3)/MCL (ref 0–0.9)
EOSINOPHIL NFR BLD AUTO: 0 %
ERYTHROCYTE [DISTWIDTH] IN BLOOD BY AUTOMATED COUNT: 15 % (ref 11.5–17)
GFR SERPLBLD CREATININE-BSD FMLA CKD-EPI: >60 ML/MIN/1.73/M2
GLUCOSE SERPL-MCNC: 229 MG/DL (ref 82–115)
HCT VFR BLD AUTO: 29.5 % (ref 37–47)
HGB BLD-MCNC: 9.5 G/DL (ref 12–16)
IMM GRANULOCYTES # BLD AUTO: 0.01 X10(3)/MCL (ref 0–0.04)
IMM GRANULOCYTES NFR BLD AUTO: 0.2 %
LYMPHOCYTES # BLD AUTO: 0.73 X10(3)/MCL (ref 0.6–4.6)
LYMPHOCYTES NFR BLD AUTO: 11.7 %
MAGNESIUM SERPL-MCNC: 1.6 MG/DL (ref 1.6–2.6)
MCH RBC QN AUTO: 27.5 PG (ref 27–31)
MCHC RBC AUTO-ENTMCNC: 32.2 G/DL (ref 33–36)
MCV RBC AUTO: 85.5 FL (ref 80–94)
MONOCYTES # BLD AUTO: 0.05 X10(3)/MCL (ref 0.1–1.3)
MONOCYTES NFR BLD AUTO: 0.8 %
NEUTROPHILS # BLD AUTO: 5.43 X10(3)/MCL (ref 2.1–9.2)
NEUTROPHILS NFR BLD AUTO: 87.3 %
PLATELET # BLD AUTO: 253 X10(3)/MCL (ref 130–400)
PMV BLD AUTO: 10.7 FL (ref 7.4–10.4)
POCT GLUCOSE: 107 MG/DL (ref 70–110)
POCT GLUCOSE: 187 MG/DL (ref 70–110)
POCT GLUCOSE: 218 MG/DL (ref 70–110)
POCT GLUCOSE: 225 MG/DL (ref 70–110)
POTASSIUM SERPL-SCNC: 3.3 MMOL/L (ref 3.5–5.1)
RBC # BLD AUTO: 3.45 X10(6)/MCL (ref 4.2–5.4)
SODIUM SERPL-SCNC: 142 MMOL/L (ref 136–145)
WBC # BLD AUTO: 6.22 X10(3)/MCL (ref 4.5–11.5)

## 2025-02-15 PROCEDURE — 63600175 PHARM REV CODE 636 W HCPCS: Performed by: EMERGENCY MEDICINE

## 2025-02-15 PROCEDURE — 25000003 PHARM REV CODE 250: Performed by: EMERGENCY MEDICINE

## 2025-02-15 PROCEDURE — 11000001 HC ACUTE MED/SURG PRIVATE ROOM

## 2025-02-15 PROCEDURE — 94761 N-INVAS EAR/PLS OXIMETRY MLT: CPT

## 2025-02-15 PROCEDURE — 36415 COLL VENOUS BLD VENIPUNCTURE: CPT | Performed by: EMERGENCY MEDICINE

## 2025-02-15 PROCEDURE — 86140 C-REACTIVE PROTEIN: CPT | Performed by: EMERGENCY MEDICINE

## 2025-02-15 PROCEDURE — 83735 ASSAY OF MAGNESIUM: CPT | Performed by: EMERGENCY MEDICINE

## 2025-02-15 PROCEDURE — 27000207 HC ISOLATION

## 2025-02-15 PROCEDURE — 85025 COMPLETE CBC W/AUTO DIFF WBC: CPT | Performed by: EMERGENCY MEDICINE

## 2025-02-15 PROCEDURE — 80048 BASIC METABOLIC PNL TOTAL CA: CPT | Performed by: EMERGENCY MEDICINE

## 2025-02-15 RX ORDER — PREDNISONE 20 MG/1
20 TABLET ORAL DAILY
Status: DISCONTINUED | OUTPATIENT
Start: 2025-02-15 | End: 2025-02-16 | Stop reason: HOSPADM

## 2025-02-15 RX ORDER — INSULIN GLARGINE 100 [IU]/ML
10 INJECTION, SOLUTION SUBCUTANEOUS DAILY
Status: DISCONTINUED | OUTPATIENT
Start: 2025-02-15 | End: 2025-02-16 | Stop reason: HOSPADM

## 2025-02-15 RX ADMIN — CARVEDILOL 12.5 MG: 12.5 TABLET, FILM COATED ORAL at 08:02

## 2025-02-15 RX ADMIN — MEMANTINE HYDROCHLORIDE 5 MG: 5 TABLET ORAL at 08:02

## 2025-02-15 RX ADMIN — INSULIN ASPART 2 UNITS: 100 INJECTION, SOLUTION INTRAVENOUS; SUBCUTANEOUS at 05:02

## 2025-02-15 RX ADMIN — SERTRALINE HYDROCHLORIDE 100 MG: 50 TABLET ORAL at 08:02

## 2025-02-15 RX ADMIN — POTASSIUM CHLORIDE 20 MEQ: 1500 TABLET, EXTENDED RELEASE ORAL at 09:02

## 2025-02-15 RX ADMIN — CARVEDILOL 12.5 MG: 12.5 TABLET, FILM COATED ORAL at 09:02

## 2025-02-15 RX ADMIN — VANCOMYCIN HYDROCHLORIDE 125 MG: KIT at 09:02

## 2025-02-15 RX ADMIN — POTASSIUM CHLORIDE 20 MEQ: 1500 TABLET, EXTENDED RELEASE ORAL at 08:02

## 2025-02-15 RX ADMIN — VANCOMYCIN HYDROCHLORIDE 125 MG: KIT at 02:02

## 2025-02-15 RX ADMIN — MIDODRINE HYDROCHLORIDE 10 MG: 5 TABLET ORAL at 08:02

## 2025-02-15 RX ADMIN — VANCOMYCIN HYDROCHLORIDE 125 MG: KIT at 04:02

## 2025-02-15 RX ADMIN — DONEPEZIL HYDROCHLORIDE 5 MG: 5 TABLET ORAL at 09:02

## 2025-02-15 RX ADMIN — LISINOPRIL 10 MG: 10 TABLET ORAL at 08:02

## 2025-02-15 RX ADMIN — MEMANTINE HYDROCHLORIDE 5 MG: 5 TABLET ORAL at 09:02

## 2025-02-15 RX ADMIN — SULFASALAZINE 500 MG: 500 TABLET, DELAYED RELEASE ORAL at 09:02

## 2025-02-15 RX ADMIN — PREDNISONE 20 MG: 20 TABLET ORAL at 12:02

## 2025-02-15 RX ADMIN — SULFASALAZINE 500 MG: 500 TABLET, DELAYED RELEASE ORAL at 08:02

## 2025-02-15 RX ADMIN — SULFASALAZINE 500 MG: 500 TABLET, DELAYED RELEASE ORAL at 04:02

## 2025-02-15 RX ADMIN — INSULIN GLARGINE 10 UNITS: 100 INJECTION, SOLUTION SUBCUTANEOUS at 08:02

## 2025-02-15 RX ADMIN — MIDODRINE HYDROCHLORIDE 10 MG: 5 TABLET ORAL at 09:02

## 2025-02-15 RX ADMIN — INSULIN ASPART 4 UNITS: 100 INJECTION, SOLUTION INTRAVENOUS; SUBCUTANEOUS at 12:02

## 2025-02-15 RX ADMIN — METRONIDAZOLE 500 MG: 500 INJECTION, SOLUTION INTRAVENOUS at 06:02

## 2025-02-15 RX ADMIN — METHYLPREDNISOLONE SODIUM SUCCINATE 125 MG: 125 INJECTION, POWDER, FOR SOLUTION INTRAMUSCULAR; INTRAVENOUS at 06:02

## 2025-02-15 RX ADMIN — PANTOPRAZOLE SODIUM 40 MG: 40 TABLET, DELAYED RELEASE ORAL at 08:02

## 2025-02-15 RX ADMIN — INSULIN ASPART 4 UNITS: 100 INJECTION, SOLUTION INTRAVENOUS; SUBCUTANEOUS at 06:02

## 2025-02-15 NOTE — PROGRESS NOTES
[facility] - Medical Surgical Unit  Hospital Medicine  Progress Note    Patient Name: Denny Mai  MRN: [id number]  Patient Class: IP- Inpatient   Admission Date: 2/14/2025  Length of Stay: 1 days  Attending Physician: Joss Barker MD  Primary Care Provider: Asad Maya MD        Subjective:     Principal Problem:Rectal bleeding    Interval History:   HPI: Ms. Mai nursing home resident referred to the ED earlier today after having 3-4 bloody bowel movements.  These have occurred over the last 24 hours.  Evaluation in the ED revealed a drop in her H&H from 11.8/361 in December 2 10.1/31 today.  She is on apixaban due to a history of AFib.  She also has a history Crohn's disease and ulcerative colitis diagnosed some years ago.  She was previously followed by Dr. Dietz in [place], [place] however since his USP has not seen anyone.  She was scheduled to see someone last December however was in the hospital at that time.  She does have a scheduled follow-up in 3 days with 1 of his associates.  Other significant findings in the ED workup included hypokalemia with serum potassium 3.0.  CT abdomen/pelvis revealed evidence of pancolitis.  Her history was primarily provided by her daughter who was at bedside.  In further discussing diagnoses of Crohn's disease and ulcerative colitis, the patient has been managed primarily with mesalamine and budesonide in the past but not on budesonide at this time.  She does not recall when the patient last had endoscopy.  Apparently Ms. Mai has had chronic diarrhea since she was in the hospital in December last year.  I cared for her at that time and treated her as an infectious gastroenteritis.  She was managed with Cipro/Flagyl, GI PCR panel was positive for sapovirus (related to norovirus) and should have been self-limited.  With her history of inflammatory bowel disease, persistent diarrhea and now bloody diarrhea I think she would  most benefit from gastroenterology follow-up and endoscopy.  To manage her acute issues while in the hospital we will trend H&H, hold anticoagulant and attempt a pulse of high-dose corticosteroid.    2/15-when seen on rounds this morning was awakened in good spirits; she had no complaints; she remains pleasantly confused which is her baseline; nursing staff report no BMs overnight; she had a small nonbloody BM this morning      Objective:     Vital Signs (Most Recent):  Temp: 97.6 °F (36.4 °C) (02/15/25 0819)  Pulse: 72 (02/15/25 0844)  Resp: 16 (02/15/25 0844)  BP: (!) 129/57 (02/15/25 0819)  SpO2: (!) 94 % (02/15/25 0844) Vital Signs (24h Range):  Temp:  [97.4 °F (36.3 °C)-98.6 °F (37 °C)] 97.6 °F (36.4 °C)  Pulse:  [64-78] 72  Resp:  [16-20] 16  SpO2:  [91 %-98 %] 94 %  BP: ()/(47-69) 129/57     Weight: 86.2 kg (190 lb)  Body mass index is 28.89 kg/m².    Intake/Output Summary (Last 24 hours) at 2/15/2025 1001  Last data filed at 2/15/2025 0931  Gross per 24 hour   Intake 649.97 ml   Output --   Net 649.97 ml     Physical exam  Constitution-well nourished, normally developed female in NAD  Eyes-PERRL, EOMI  HENT-normocephalic, atraumatic  Neck-supple  Respiratory-normal respirations  Heart-RRR  Abdomen-soft, nontender, nondistended; active bowel sounds  Genitourinary-deferred  Musculoskeletal-no joint abnormalities, normal ROM throughout  Skin-warm, dry; no rashes  Neurologic-alert and oriented to person, confused; nonfocal exam     Scheduled Meds:   carvediloL  12.5 mg Oral BID    donepeziL  5 mg Oral QHS    insulin glargine U-100  10 Units Subcutaneous Daily    lisinopriL  10 mg Oral Daily    memantine  5 mg Oral BID    methylPREDNISolone injection (PEDS and ADULTS)  125 mg Intravenous Q8H    midodrine  10 mg Oral Q12H    pantoprazole  40 mg Oral Daily    potassium chloride  20 mEq Oral BID    sertraline  100 mg Oral Daily    sulfaSALAzine  500 mg Oral TID    vancomycin  125 mg Oral Q6H     Continuous  "Infusions:  PRN Meds:.  Current Facility-Administered Medications:     acetaminophen, 650 mg, Oral, Q4H PRN    dextrose 50%, 12.5 g, Intravenous, PRN    dextrose 50%, 25 g, Intravenous, PRN    glucagon (human recombinant), 1 mg, Intramuscular, PRN    glucose, 16 g, Oral, PRN    glucose, 24 g, Oral, PRN    insulin aspart U-100, 0-10 Units, Subcutaneous, QID (AC + HS) PRN    melatonin, 6 mg, Oral, Nightly PRN    ondansetron, 4 mg, Intravenous, Q8H PRN    sodium chloride 0.9%, 10 mL, Intravenous, PRN    Significant Labs: All pertinent labs within the past 24 hours have been reviewed.  CBC:   Recent Labs   Lab 02/14/25  0958 02/14/25  1605 02/15/25  0512   WBC 8.16  --  6.22   HGB 10.1* 10.5* 9.5*   HCT 31.0* 32.3* 29.5*     --  253     CMP:   Recent Labs   Lab 02/14/25  0958 02/15/25  0512    142   K 3.0* 3.3*    106   CO2 27 31   BUN 18.0 14.0   CREATININE 0.99 0.80   CALCIUM 8.1* 8.4   ALBUMIN 2.5*  --    BILITOT 0.6  --    ALKPHOS 35*  --    AST 17  --    ALT 11  --      Magnesium:   Recent Labs   Lab 02/15/25  0512   MG 1.60     POCT Glucose:   Recent Labs   Lab 02/14/25  2015 02/15/25  0612   POCTGLUCOSE 321* 225*     Urine Studies: No results for input(s): "COLORU", "APPEARANCEUA", "PHUR", "SPECGRAV", "PROTEINUA", "GLUCUA", "KETONESU", "BILIRUBINUA", "OCCULTUA", "NITRITE", "UROBILINOGEN", "LEUKOCYTESUR", "RBCUA", "WBCUA", "BACTERIA", "SQUAMEPITHEL", "HYALINECASTS" in the last 48 hours.    Invalid input(s): "WRIGHTSUR"    Significant Imaging:   CT abdomen/pelvis  IMPRESSION  1. No CT evidence of active GI tract hemorrhage.  2. Findings suggestive of pancolitis, would be favored infectious or inflammatory etiology.  3. No other convincing acute process or significant interval change.     Assessment/Plan:   Rectal bleeding  No further bleeding since admission  CT abdomen/pelvis reveals evidence of pancolitis and with patient's history of IBD suspect thought this to be the etiology.    Stool " studies positive for C diff    CRP elevated-8.2  She was started on oral vancomycin yesterday evening with intent to treat 10 days  Cipro/metronidazole discontinued  Received IV Solu-Medrol 125 mg x3 doses; transition to oral prednisone, continue sulfasalazine (hospital substitute for mesalamine)  Continue PPI  H&H has trended down slightly from admission; continue to monitor  She has follow-up appointment with Gastroenterology on Monday, 2/17     History of AFib/ppm status  Currently in paced rhythm  She is s/p ablation therapy some years ago but continued on OAC with apixaban  In face of GI bleeding we will hold apixaban for now  She is followed by Dr. Valentino in [place], [place]     Dementia  Continue routine medication     Hypokalemia  Supplement as necessary     Diabetes mellitus  Monitor blood sugars with Accu-Cheks and SSI   Blood sugars elevated; add basal insulin     Hypertension  Continue beta-blocker and ACE inhibitor; hold spironolactone  and furosemide for now     Chronic systolic heart failure  Compensated  GDMT-beta-blocker, ACEI, spironolactone  No echo available for review     Code status:  Full   Active Diagnoses:    Diagnosis Date Noted POA    PRINCIPAL PROBLEM:  Rectal bleeding [K62.5] 02/14/2025 Yes      Problems Resolved During this Admission:     VTE Risk Mitigation (From admission, onward)           Ordered     IP VTE HIGH RISK PATIENT  Once         02/14/25 1424     Place sequential compression device  Until discontinued         02/14/25 1424     Reason for No Pharmacological VTE Prophylaxis  Once        Question:  Reasons:  Answer:  Active Bleeding    02/14/25 1424                       Joss Barker MD  Department of Hospital Medicine   [facility] - Medical Surgical Unit

## 2025-02-16 VITALS
DIASTOLIC BLOOD PRESSURE: 68 MMHG | BODY MASS INDEX: 28.8 KG/M2 | TEMPERATURE: 98 F | SYSTOLIC BLOOD PRESSURE: 112 MMHG | HEART RATE: 77 BPM | OXYGEN SATURATION: 90 % | WEIGHT: 190.06 LBS | RESPIRATION RATE: 20 BRPM | HEIGHT: 68 IN

## 2025-02-16 LAB
ANION GAP SERPL CALC-SCNC: 7 MEQ/L
BASOPHILS # BLD AUTO: 0.02 X10(3)/MCL
BASOPHILS NFR BLD AUTO: 0.2 %
BUN SERPL-MCNC: 15 MG/DL (ref 9.8–20.1)
CALCIUM SERPL-MCNC: 8.3 MG/DL (ref 8.4–10.2)
CHLORIDE SERPL-SCNC: 108 MMOL/L (ref 98–107)
CO2 SERPL-SCNC: 28 MMOL/L (ref 23–31)
CREAT SERPL-MCNC: 0.82 MG/DL (ref 0.55–1.02)
CREAT/UREA NIT SERPL: 18
EOSINOPHIL # BLD AUTO: 0.01 X10(3)/MCL (ref 0–0.9)
EOSINOPHIL NFR BLD AUTO: 0.1 %
ERYTHROCYTE [DISTWIDTH] IN BLOOD BY AUTOMATED COUNT: 15.6 % (ref 11.5–17)
GFR SERPLBLD CREATININE-BSD FMLA CKD-EPI: >60 ML/MIN/1.73/M2
GLUCOSE SERPL-MCNC: 99 MG/DL (ref 82–115)
HCT VFR BLD AUTO: 31.4 % (ref 37–47)
HGB BLD-MCNC: 10.1 G/DL (ref 12–16)
IMM GRANULOCYTES # BLD AUTO: 0.09 X10(3)/MCL (ref 0–0.04)
IMM GRANULOCYTES NFR BLD AUTO: 0.7 %
LYMPHOCYTES # BLD AUTO: 0.97 X10(3)/MCL (ref 0.6–4.6)
LYMPHOCYTES NFR BLD AUTO: 7.9 %
MAGNESIUM SERPL-MCNC: 1.7 MG/DL (ref 1.6–2.6)
MCH RBC QN AUTO: 27.4 PG (ref 27–31)
MCHC RBC AUTO-ENTMCNC: 32.2 G/DL (ref 33–36)
MCV RBC AUTO: 85.3 FL (ref 80–94)
MONOCYTES # BLD AUTO: 0.61 X10(3)/MCL (ref 0.1–1.3)
MONOCYTES NFR BLD AUTO: 5 %
NEUTROPHILS # BLD AUTO: 10.6 X10(3)/MCL (ref 2.1–9.2)
NEUTROPHILS NFR BLD AUTO: 86.1 %
NRBC BLD AUTO-RTO: 0 %
PLATELET # BLD AUTO: 257 X10(3)/MCL (ref 130–400)
PMV BLD AUTO: 11.3 FL (ref 7.4–10.4)
POCT GLUCOSE: 90 MG/DL (ref 70–110)
POTASSIUM SERPL-SCNC: 3.3 MMOL/L (ref 3.5–5.1)
RBC # BLD AUTO: 3.68 X10(6)/MCL (ref 4.2–5.4)
SODIUM SERPL-SCNC: 143 MMOL/L (ref 136–145)
WBC # BLD AUTO: 12.3 X10(3)/MCL (ref 4.5–11.5)

## 2025-02-16 PROCEDURE — 25000003 PHARM REV CODE 250: Performed by: EMERGENCY MEDICINE

## 2025-02-16 PROCEDURE — 63600175 PHARM REV CODE 636 W HCPCS: Performed by: EMERGENCY MEDICINE

## 2025-02-16 PROCEDURE — 80048 BASIC METABOLIC PNL TOTAL CA: CPT | Performed by: EMERGENCY MEDICINE

## 2025-02-16 PROCEDURE — 83735 ASSAY OF MAGNESIUM: CPT | Performed by: EMERGENCY MEDICINE

## 2025-02-16 PROCEDURE — 36415 COLL VENOUS BLD VENIPUNCTURE: CPT | Performed by: EMERGENCY MEDICINE

## 2025-02-16 PROCEDURE — 85025 COMPLETE CBC W/AUTO DIFF WBC: CPT | Performed by: EMERGENCY MEDICINE

## 2025-02-16 PROCEDURE — 94761 N-INVAS EAR/PLS OXIMETRY MLT: CPT

## 2025-02-16 RX ORDER — POTASSIUM CHLORIDE 20 MEQ/1
20 TABLET, EXTENDED RELEASE ORAL DAILY
Start: 2025-02-16 | End: 2025-02-21

## 2025-02-16 RX ORDER — VANCOMYCIN HYDROCHLORIDE 125 MG/1
125 CAPSULE ORAL 4 TIMES DAILY
Start: 2025-02-16 | End: 2025-02-24

## 2025-02-16 RX ORDER — PREDNISONE 20 MG/1
20 TABLET ORAL DAILY
Start: 2025-02-16 | End: 2025-02-21

## 2025-02-16 RX ORDER — BUDESONIDE 3 MG/1
3 CAPSULE, COATED PELLETS ORAL DAILY
Start: 2025-02-16

## 2025-02-16 RX ORDER — APIXABAN 5 MG/1
5 TABLET, FILM COATED ORAL 2 TIMES DAILY
Start: 2025-02-16

## 2025-02-16 RX ORDER — MAGNESIUM SULFATE HEPTAHYDRATE 40 MG/ML
2 INJECTION, SOLUTION INTRAVENOUS ONCE
Status: COMPLETED | OUTPATIENT
Start: 2025-02-16 | End: 2025-02-16

## 2025-02-16 RX ADMIN — VANCOMYCIN HYDROCHLORIDE 125 MG: KIT at 09:02

## 2025-02-16 RX ADMIN — CARVEDILOL 12.5 MG: 12.5 TABLET, FILM COATED ORAL at 09:02

## 2025-02-16 RX ADMIN — SERTRALINE HYDROCHLORIDE 100 MG: 50 TABLET ORAL at 09:02

## 2025-02-16 RX ADMIN — SULFASALAZINE 500 MG: 500 TABLET, DELAYED RELEASE ORAL at 09:02

## 2025-02-16 RX ADMIN — LISINOPRIL 10 MG: 10 TABLET ORAL at 09:02

## 2025-02-16 RX ADMIN — MIDODRINE HYDROCHLORIDE 10 MG: 5 TABLET ORAL at 09:02

## 2025-02-16 RX ADMIN — PREDNISONE 20 MG: 20 TABLET ORAL at 09:02

## 2025-02-16 RX ADMIN — POTASSIUM CHLORIDE 20 MEQ: 1500 TABLET, EXTENDED RELEASE ORAL at 09:02

## 2025-02-16 RX ADMIN — MEMANTINE HYDROCHLORIDE 5 MG: 5 TABLET ORAL at 09:02

## 2025-02-16 RX ADMIN — PANTOPRAZOLE SODIUM 40 MG: 40 TABLET, DELAYED RELEASE ORAL at 09:02

## 2025-02-16 RX ADMIN — INSULIN GLARGINE 10 UNITS: 100 INJECTION, SOLUTION SUBCUTANEOUS at 09:02

## 2025-02-16 RX ADMIN — MAGNESIUM SULFATE IN WATER FOR 2 G: 40 INJECTION INTRAVENOUS at 09:02

## 2025-02-16 RX ADMIN — VANCOMYCIN HYDROCHLORIDE 125 MG: KIT at 03:02

## 2025-02-16 NOTE — PLAN OF CARE
Patient will be discharged today back to [facility]. Spoke to Tennille at NH and she will place patient in will call for transport. Sent d/c information via fax. Notified nurse to call report to Keyla at [phone] then take patient out of will call. Informed her to send patient with face sheet and AVS to NH for return.

## 2025-02-16 NOTE — DISCHARGE SUMMARY
[facility] - Medical Surgical Unit  Hospital Medicine  Discharge Summary      Patient Name: Denny Mai  MRN: [id number]  Admission Date: 2/14/2025  Hospital Length of Stay: 2 days  Discharge Date and Time:  02/16/2025 9:32 AM  Attending Physician: Joss Barker MD   Discharging Provider: Joss Barker MD  Discharge Provider Team: Networked reference to record PCT   Primary Care Provider: Asad Maya MD        HPI:   Ms. Mai nursing home resident referred to the ED earlier today after having 3-4 bloody bowel movements.  These have occurred over the last 24 hours.  Evaluation in the ED revealed a drop in her H&H from 11.8/361 in December 2 10.1/31 today.  She is on apixaban due to a history of AFib.  She also has a history Crohn's disease and ulcerative colitis diagnosed some years ago.  She was previously followed by Dr. Dietz in [place], [place] however since his detention has not seen anyone.  She was scheduled to see someone last December however was in the hospital at that time.  She does have a scheduled follow-up in 3 days with 1 of his associates.  Other significant findings in the ED workup included hypokalemia with serum potassium 3.0.  CT abdomen/pelvis revealed evidence of pancolitis.  Her history was primarily provided by her daughter who was at bedside.  In further discussing diagnoses of Crohn's disease and ulcerative colitis, the patient has been managed primarily with mesalamine and budesonide in the past but not on budesonide at this time.  She does not recall when the patient last had endoscopy.  Apparently Ms. Mai has had chronic diarrhea since she was in the hospital in December last year.  I cared for her at that time and treated her as an infectious gastroenteritis.  She was managed with Cipro/Flagyl, GI PCR panel was positive for sapovirus (related to norovirus) and should have been self-limited.  With her history of inflammatory bowel disease,  persistent diarrhea and now bloody diarrhea I think she would most benefit from gastroenterology follow-up and endoscopy.  To manage her acute issues while in the hospital we will trend H&H, hold anticoagulant and attempt a pulse of high-dose corticosteroid.    * No surgery found *      Hospital Course:   2/15-when seen on rounds this morning was awakened in good spirits; she had no complaints; she remains pleasantly confused which is her baseline; nursing staff report no BMs overnight; she had a small nonbloody BM this morning    2/16-she had 1 or 2 loose BMs overnight but no further bleeding since shortly after admission; when seen this morning was seated on the side of the bed eating breakfast and had no new complaints    Discharge diagnoses    Rectal bleeding  No further bleeding since admission  CT abdomen/pelvis reveals evidence of pancolitis and with patient's history of IBD suspect thought this to be the etiology.    Stool studies positive for C diff    CRP elevated-8.2  She was started on oral vancomycin 2/14 with intent to treat 10 days  Cipro/metronidazole discontinued  Received IV Solu-Medrol 125 mg x3 doses; transitioned to oral prednisone, continue sulfasalazine (hospital substitute for mesalamine)  H&H trended down initially but stabilized  Discharge on vancomycin 125 mg q.i.d. for 8 days to complete 10 day course of therapy  Discharge on prednisone 20 mg daily for 5 days after which time patient can transition back to budesonide 3 mg daily  Continue PPI  She has follow-up appointment with Gastroenterology on Monday, 2/17     History of AFib/ppm status  Currently in paced rhythm  She is s/p ablation therapy some years ago but continued on OAC with apixaban  In face of GI bleeding we will hold apixaban 7 days after which it can be resumed  She is followed by Dr. Valentino in [place], [place]     Dementia  Continue routine medication     Hypokalemia  Supplement as necessary     Diabetes  mellitus  Resume routine regimen     Hypertension  Resume routine medications     Chronic systolic heart failure  Compensated  GDMT-beta-blocker, ACEI, spironolactone  No echo available for review  Continue routine medications    Physical exam  Constitution-well nourished, normally developed female in NAD  Eyes-PERRL, EOMI  HENT-normocephalic, atraumatic  Neck-supple  Respiratory-normal respirations  Heart-RRR  Abdomen-soft, nontender, nondistended  Genitourinary-deferred  Musculoskeletal-no joint abnormalities, normal ROM throughout  Skin-warm, dry; no rashes  Neurologic-alert and oriented to person, confused    Consults:   Consults (From admission, onward)          Status Ordering Provider     Inpatient consult to General Surgery  Once        Provider:  RAGHAV Thomson MD    Acknowledged EL DE LA ROSA            Final Active Diagnoses:    Diagnosis Date Noted POA    PRINCIPAL PROBLEM:  Rectal bleeding [K62.5] 02/14/2025 Yes      Problems Resolved During this Admission:      Discharged Condition: stable    Disposition: Nursing Facility    Follow Up:   Follow-up Information       Asad Maya MD Follow up in 1 week(s).    Specialty: Internal Medicine  Contact information:  [facility] [facility] [address]  [phone]                           Patient Instructions:      Diet diabetic     Activity as tolerated     Medications:  Reconciled Home Medications:      Medication List        START taking these medications      potassium chloride SA 20 MEQ tablet  Commonly known as: K-DUR,KLOR-CON  Take 1 tablet (20 mEq total) by mouth once daily. for 5 days     predniSONE 20 MG tablet  Commonly known as: DELTASONE  Take 1 tablet (20 mg total) by mouth once daily. for 5 days     vancomycin 125 MG capsule  Commonly known as: VANCOCIN  Take 1 capsule (125 mg total) by mouth 4 (four) times daily. for 8 days            CONTINUE taking these medications      ACCU-CHEK SMARTVIEW TEST STRIP  "Strp  Generic drug: blood sugar diagnostic     B-complex with vitamin C tablet  Commonly known as: Z-Bec or Equiv  Take 1 tablet by mouth once daily.     budesonide 3 mg capsule  Commonly known as: ENTOCORT EC  Take 1 capsule (3 mg total) by mouth once daily.     carvediloL 12.5 MG tablet  Commonly known as: COREG  Take 12.5 mg by mouth 2 (two) times a day.     cholecalciferol (vitamin D3) 50 mcg (2,000 unit) Cap capsule  Commonly known as: VITAMIN D3  Take 2,000 Units by mouth once daily.     donepeziL 5 MG tablet  Commonly known as: ARICEPT  Take 1 tablet (5 mg total) by mouth every evening.     DROPLET INSULIN SYRINGE 0.3 mL 31 gauge x 5/16" Syrg  Generic drug: insulin syringe-needle U-100     ELIQUIS 5 mg Tab  Generic drug: apixaban  Take 1 tablet (5 mg total) by mouth 2 (two) times daily.     fenofibrate 160 MG Tab  Take 160 mg by mouth once daily.     ferrous sulfate Tab tablet  Commonly known as: FEOSOL  Take 1 tablet by mouth once daily.     furosemide 20 MG tablet  Commonly known as: LASIX  Take 20 mg by mouth once daily.     insulin asp prt-insulin aspart (NovoLOG 70/30) 100 unit/mL (70-30) Soln  Commonly known as: NovoLOG Mix 70-30 U-100 Insuln  Inject 10 Units into the skin 2 (two) times daily.     LANTUS SOLOSTAR U-100 INSULIN 100 unit/mL (3 mL) Inpn pen  Generic drug: insulin glargine U-100 (Lantus)  Inject 10 Units into the skin every evening.     lisinopriL 10 MG tablet  Take 10 mg by mouth once daily.     magnesium oxide 400 mg (241.3 mg magnesium) tablet  Commonly known as: MAG-OX  Take 400 mg by mouth once daily.     memantine 5 MG Tab  Commonly known as: NAMENDA  Take 1 tablet (5 mg total) by mouth 2 (two) times daily.     mesalamine 500 MG CR capsule  Commonly known as: PENTASA  Take 500 mg by mouth 3 (three) times daily.     midodrine 10 MG tablet  Commonly known as: PROAMATINE  Take 10 mg by mouth every 12 (twelve) hours.     NON FORMULARY MEDICATION  Inject 12.5 mg into the muscle every 8 " (eight) hours as needed (Nausea and vomiting). Promethazine 12.5mg IM     * insulin regular 100 unit/mL injection  Inject into the skin 4 (four) times daily as needed for High Blood Sugar.     * NovoLIN R FlexPen 100 unit/mL (3 mL) Inpn pen  Generic drug: insulin regular human     omega-3 fatty acids-fish oil 684-1,200 mg Cpdr  Take 1 capsule by mouth once daily.     ondansetron 4 MG tablet  Commonly known as: ZOFRAN  Take 1 tablet (4 mg total) by mouth every 6 (six) hours.     pantoprazole 40 MG tablet  Commonly known as: PROTONIX  Take 40 mg by mouth once daily.     sertraline 100 MG tablet  Commonly known as: ZOLOFT  Take 100 mg by mouth once daily.     simethicone 125 MG chewable tablet  Commonly known as: MYLICON  Take 125 mg by mouth every 4 (four) hours as needed for Flatulence.     spironolactone 25 MG tablet  Commonly known as: ALDACTONE  Take 25 mg by mouth once daily.     TRULICITY 0.75 mg/0.5 mL pen injector  Generic drug: dulaglutide  Inject 0.75 mg into the skin every 7 days. Every friday           * This list has 2 medication(s) that are the same as other medications prescribed for you. Read the directions carefully, and ask your doctor or other care provider to review them with you.                  Significant Diagnostic Studies: Labs: CMP   Recent Labs   Lab 02/14/25  0958 02/15/25  0512 02/16/25  0502    142 143   K 3.0* 3.3* 3.3*    106 108*   CO2 27 31 28   BUN 18.0 14.0 15.0   CREATININE 0.99 0.80 0.82   CALCIUM 8.1* 8.4 8.3*   ALBUMIN 2.5*  --   --    BILITOT 0.6  --   --    ALKPHOS 35*  --   --    AST 17  --   --    ALT 11  --   --     and CBC   Recent Labs   Lab 02/14/25  0958 02/14/25  1605 02/15/25  0512 02/16/25  0502   WBC 8.16  --  6.22 12.30*   HGB 10.1* 10.5* 9.5* 10.1*   HCT 31.0* 32.3* 29.5* 31.4*     --  253 257     Radiology:   CT abdomen/pelvis  IMPRESSION  1. No CT evidence of active GI tract hemorrhage.  2. Findings suggestive of pancolitis, would be favored  infectious or inflammatory etiology.  3. No other convincing acute process or significant interval change.     Pending Diagnostic Studies:       None          Indwelling Lines/Drains at time of discharge:   Lines/Drains/Airways       None                 Patient screened for social drivers of health:  Housing instability  Transportation needs  Utility difficulties  Interpersonal safety  ---no needs identified    Time spent on the discharge of patient: 42 minutes         Joss Barker MD  Department of Hospital Medicine  [facility] - [facility] Surgical [facility]

## 2025-02-16 NOTE — PLAN OF CARE
Problem: Adult Inpatient Plan of Care  Goal: Plan of Care Review  Outcome: Progressing  Goal: Patient-Specific Goal (Individualized)  Outcome: Progressing  Goal: Absence of Hospital-Acquired Illness or Injury  Outcome: Progressing  Goal: Optimal Comfort and Wellbeing  Outcome: Progressing  Goal: Readiness for Transition of Care  Outcome: Progressing     Problem: Acute Kidney Injury/Impairment  Goal: Fluid and Electrolyte Balance  Outcome: Progressing  Goal: Improved Oral Intake  Outcome: Progressing  Goal: Effective Renal Function  Outcome: Progressing     Problem: Wound  Goal: Optimal Coping  Outcome: Progressing  Goal: Optimal Functional Ability  Outcome: Progressing  Goal: Absence of Infection Signs and Symptoms  Outcome: Progressing  Goal: Improved Oral Intake  Outcome: Progressing  Goal: Optimal Pain Control and Function  Outcome: Progressing  Goal: Skin Health and Integrity  Outcome: Progressing  Goal: Optimal Wound Healing  Outcome: Progressing     Problem: Comorbidity Management  Goal: Maintenance of Heart Failure Symptom Control  Outcome: Progressing  Goal: Blood Pressure in Desired Range  Outcome: Progressing     Problem: Dysrhythmia  Goal: Normalized Cardiac Rhythm  Outcome: Progressing     Problem: Dementia Signs/Symptoms  Goal: Improved Behavioral Control (Dementia Signs/Symptoms)  Outcome: Progressing  Goal: Improved Mood Symptoms (Dementia Signs/Symptoms)  Outcome: Progressing  Goal: Optimized Cognitive Function (Dementia Signs/Symptoms)  Outcome: Progressing  Goal: Optimized Oral Intake (Dementia Signs/Symptoms)  Outcome: Progressing  Goal: Improved Sleep (Dementia Signs/Symptoms)  Outcome: Progressing  Goal: Enhanced Social or Functional Skills and Ability (Dementia Signs/Symptoms)  Outcome: Progressing     Problem: Gastrointestinal Bleeding  Goal: Optimal Coping with Acute Illness  Outcome: Progressing  Goal: Hemostasis  Outcome: Progressing     Problem: Fall Injury Risk  Goal: Absence of Fall  and Fall-Related Injury  Outcome: Progressing     Problem: Skin Injury Risk Increased  Goal: Skin Health and Integrity  Outcome: Progressing     Problem: Diabetes Comorbidity  Goal: Blood Glucose Level Within Targeted Range  Outcome: Progressing

## 2025-02-17 LAB — BACTERIA STL CULT: NORMAL

## 2025-02-19 ENCOUNTER — PATIENT OUTREACH (OUTPATIENT)
Dept: ADMINISTRATIVE | Facility: CLINIC | Age: OVER 89
End: 2025-02-19
Payer: MEDICARE

## 2025-03-23 ENCOUNTER — LAB REQUISITION (OUTPATIENT)
Dept: LAB | Facility: HOSPITAL | Age: OVER 89
End: 2025-03-23
Payer: MEDICARE

## 2025-03-23 DIAGNOSIS — E11.42 TYPE 2 DIABETES MELLITUS WITH DIABETIC POLYNEUROPATHY: ICD-10-CM

## 2025-03-23 LAB
ALBUMIN SERPL-MCNC: 2.8 G/DL (ref 3.4–4.8)
ALBUMIN/GLOB SERPL: 1.2 RATIO (ref 1.1–2)
ALP SERPL-CCNC: 41 UNIT/L (ref 40–150)
ALT SERPL-CCNC: 12 UNIT/L (ref 0–55)
ANION GAP SERPL CALC-SCNC: 8 MEQ/L
AST SERPL-CCNC: 13 UNIT/L (ref 11–45)
BACTERIA #/AREA URNS AUTO: ABNORMAL /HPF
BASOPHILS # BLD AUTO: 0.02 X10(3)/MCL
BASOPHILS NFR BLD AUTO: 0.2 %
BILIRUB SERPL-MCNC: 0.4 MG/DL
BILIRUB UR QL STRIP.AUTO: NEGATIVE
BUN SERPL-MCNC: 37 MG/DL (ref 9.8–20.1)
CALCIUM SERPL-MCNC: 8.6 MG/DL (ref 8.4–10.2)
CHLORIDE SERPL-SCNC: 105 MMOL/L (ref 98–107)
CLARITY UR: CLEAR
CO2 SERPL-SCNC: 26 MMOL/L (ref 23–31)
COLOR UR AUTO: YELLOW
CREAT SERPL-MCNC: 1.28 MG/DL (ref 0.55–1.02)
CREAT/UREA NIT SERPL: 29
EOSINOPHIL # BLD AUTO: 0.11 X10(3)/MCL (ref 0–0.9)
EOSINOPHIL NFR BLD AUTO: 1.2 %
ERYTHROCYTE [DISTWIDTH] IN BLOOD BY AUTOMATED COUNT: 14.9 % (ref 11.5–17)
GFR SERPLBLD CREATININE-BSD FMLA CKD-EPI: 40 ML/MIN/1.73/M2
GLOBULIN SER-MCNC: 2.4 GM/DL (ref 2.4–3.5)
GLUCOSE SERPL-MCNC: 194 MG/DL (ref 82–115)
GLUCOSE UR QL STRIP: NEGATIVE
HCT VFR BLD AUTO: 27.9 % (ref 37–47)
HGB BLD-MCNC: 9.4 G/DL (ref 12–16)
HGB UR QL STRIP: ABNORMAL
IMM GRANULOCYTES # BLD AUTO: 0.02 X10(3)/MCL (ref 0–0.04)
IMM GRANULOCYTES NFR BLD AUTO: 0.2 %
KETONES UR QL STRIP: NEGATIVE
LEUKOCYTE ESTERASE UR QL STRIP: ABNORMAL
LYMPHOCYTES # BLD AUTO: 1 X10(3)/MCL (ref 0.6–4.6)
LYMPHOCYTES NFR BLD AUTO: 10.7 %
MCH RBC QN AUTO: 28.5 PG (ref 27–31)
MCHC RBC AUTO-ENTMCNC: 33.7 G/DL (ref 33–36)
MCV RBC AUTO: 84.5 FL (ref 80–94)
MONOCYTES # BLD AUTO: 0.65 X10(3)/MCL (ref 0.1–1.3)
MONOCYTES NFR BLD AUTO: 7 %
NEUTROPHILS # BLD AUTO: 7.55 X10(3)/MCL (ref 2.1–9.2)
NEUTROPHILS NFR BLD AUTO: 80.7 %
NITRITE UR QL STRIP: POSITIVE
NRBC BLD AUTO-RTO: 0 %
PH UR STRIP: 5 [PH]
PLATELET # BLD AUTO: 265 X10(3)/MCL (ref 130–400)
PMV BLD AUTO: 11.5 FL (ref 7.4–10.4)
POTASSIUM SERPL-SCNC: 4.4 MMOL/L (ref 3.5–5.1)
PROT SERPL-MCNC: 5.2 GM/DL (ref 5.8–7.6)
PROT UR QL STRIP: NEGATIVE
RBC # BLD AUTO: 3.3 X10(6)/MCL (ref 4.2–5.4)
RBC #/AREA URNS AUTO: ABNORMAL /HPF
SODIUM SERPL-SCNC: 139 MMOL/L (ref 136–145)
SP GR UR STRIP.AUTO: 1.01 (ref 1–1.03)
SQUAMOUS #/AREA URNS AUTO: ABNORMAL /HPF
UROBILINOGEN UR STRIP-ACNC: 0.2
WBC # BLD AUTO: 9.35 X10(3)/MCL (ref 4.5–11.5)
WBC #/AREA URNS AUTO: ABNORMAL /HPF

## 2025-03-23 PROCEDURE — 81003 URINALYSIS AUTO W/O SCOPE: CPT | Performed by: INTERNAL MEDICINE

## 2025-03-23 PROCEDURE — 85025 COMPLETE CBC W/AUTO DIFF WBC: CPT | Performed by: INTERNAL MEDICINE

## 2025-03-23 PROCEDURE — 80053 COMPREHEN METABOLIC PANEL: CPT | Performed by: INTERNAL MEDICINE

## 2025-03-23 PROCEDURE — 87077 CULTURE AEROBIC IDENTIFY: CPT | Performed by: INTERNAL MEDICINE

## 2025-03-26 LAB — BACTERIA UR CULT: ABNORMAL

## 2025-05-06 ENCOUNTER — HOSPITAL ENCOUNTER (EMERGENCY)
Facility: HOSPITAL | Age: OVER 89
Discharge: NURSING, REHAB OR LONG-TERM CARE | End: 2025-05-06
Attending: INTERNAL MEDICINE
Payer: MEDICARE

## 2025-05-06 VITALS
BODY MASS INDEX: 26.68 KG/M2 | WEIGHT: 170 LBS | OXYGEN SATURATION: 97 % | RESPIRATION RATE: 22 BRPM | SYSTOLIC BLOOD PRESSURE: 113 MMHG | HEART RATE: 69 BPM | TEMPERATURE: 97 F | DIASTOLIC BLOOD PRESSURE: 57 MMHG | HEIGHT: 67 IN

## 2025-05-06 DIAGNOSIS — K92.1 HEMATOCHEZIA: Primary | ICD-10-CM

## 2025-05-06 LAB
ANION GAP SERPL CALC-SCNC: 7 MEQ/L
APTT PPP: 33.3 SECONDS (ref 24.2–35.9)
BASOPHILS # BLD AUTO: 0.02 X10(3)/MCL
BASOPHILS NFR BLD AUTO: 0.5 %
BUN SERPL-MCNC: 22 MG/DL (ref 9.8–20.1)
CALCIUM SERPL-MCNC: 8.8 MG/DL (ref 8.4–10.2)
CHLORIDE SERPL-SCNC: 106 MMOL/L (ref 98–107)
CO2 SERPL-SCNC: 28 MMOL/L (ref 23–31)
CREAT SERPL-MCNC: 1.17 MG/DL (ref 0.55–1.02)
CREAT/UREA NIT SERPL: 19
EOSINOPHIL # BLD AUTO: 0.21 X10(3)/MCL (ref 0–0.9)
EOSINOPHIL NFR BLD AUTO: 5 %
ERYTHROCYTE [DISTWIDTH] IN BLOOD BY AUTOMATED COUNT: 15.1 % (ref 11.5–17)
GFR SERPLBLD CREATININE-BSD FMLA CKD-EPI: 45 ML/MIN/1.73/M2
GLUCOSE SERPL-MCNC: 97 MG/DL (ref 82–115)
HCT VFR BLD AUTO: 30.2 % (ref 37–47)
HEMOCCULT SP1 STL QL: POSITIVE
HGB BLD-MCNC: 9.7 G/DL (ref 12–16)
IMM GRANULOCYTES # BLD AUTO: 0.01 X10(3)/MCL (ref 0–0.04)
IMM GRANULOCYTES NFR BLD AUTO: 0.2 %
INR PPP: 1.1
LYMPHOCYTES # BLD AUTO: 0.75 X10(3)/MCL (ref 0.6–4.6)
LYMPHOCYTES NFR BLD AUTO: 17.9 %
MCH RBC QN AUTO: 28.7 PG (ref 27–31)
MCHC RBC AUTO-ENTMCNC: 32.1 G/DL (ref 33–36)
MCV RBC AUTO: 89.3 FL (ref 80–94)
MONOCYTES # BLD AUTO: 0.47 X10(3)/MCL (ref 0.1–1.3)
MONOCYTES NFR BLD AUTO: 11.2 %
NEUTROPHILS # BLD AUTO: 2.73 X10(3)/MCL (ref 2.1–9.2)
NEUTROPHILS NFR BLD AUTO: 65.2 %
NRBC BLD AUTO-RTO: 0 %
PLATELET # BLD AUTO: 247 X10(3)/MCL (ref 130–400)
PMV BLD AUTO: 10.6 FL (ref 7.4–10.4)
POTASSIUM SERPL-SCNC: 4.2 MMOL/L (ref 3.5–5.1)
PROTHROMBIN TIME: 15.1 SECONDS (ref 12.4–14.9)
RBC # BLD AUTO: 3.38 X10(6)/MCL (ref 4.2–5.4)
SODIUM SERPL-SCNC: 141 MMOL/L (ref 136–145)
WBC # BLD AUTO: 4.19 X10(3)/MCL (ref 4.5–11.5)

## 2025-05-06 PROCEDURE — 85025 COMPLETE CBC W/AUTO DIFF WBC: CPT | Performed by: INTERNAL MEDICINE

## 2025-05-06 PROCEDURE — 80048 BASIC METABOLIC PNL TOTAL CA: CPT | Performed by: INTERNAL MEDICINE

## 2025-05-06 PROCEDURE — 85730 THROMBOPLASTIN TIME PARTIAL: CPT | Performed by: INTERNAL MEDICINE

## 2025-05-06 PROCEDURE — 82272 OCCULT BLD FECES 1-3 TESTS: CPT | Performed by: INTERNAL MEDICINE

## 2025-05-06 PROCEDURE — 99283 EMERGENCY DEPT VISIT LOW MDM: CPT

## 2025-05-06 PROCEDURE — 85610 PROTHROMBIN TIME: CPT | Performed by: INTERNAL MEDICINE

## 2025-05-06 NOTE — ED PROVIDER NOTES
Encounter Date: 5/6/2025  History from patient and medics and nurses from the nursing home     History     Chief Complaint   Patient presents with    Rectal Bleeding     Nursing home reports rectal bleeding that started in the night, pt is on Eliquis. Staff reports bright red blood     HPI    Denny Mai is 89 y.o. female who  has a past medical history of Anemia, unspecified, CAD (coronary artery disease), Chronic systolic HF (heart failure), Crohn disease, Dementia, DM (diabetes mellitus), Hemorrhoids, History of coronary angioplasty with insertion of stent, HLD (hyperlipidemia), HTN (hypertension), Memory loss, and Paroxysmal atrial fibrillation. arrives in ER with c/o Rectal Bleeding (Nursing home reports rectal bleeding that started in the night, pt is on Eliquis. Staff reports bright red blood)    Patient says that after she had a bowel movement she saw some blood in the toilet.  She says it is hurting a little bit, denies any particular dizziness, lightheadedness, denies any other complaints.      Review of patient's allergies indicates:   Allergen Reactions    Centratex [iron-folic acid-mv, min cmb#15]     Cephalexin     Erythromycin     Hydroxyzine     Iodinated contrast media     Iodine     Naldecon dx     Penicillins Other (See Comments)     unknown    Tetanus vaccines and toxoid     Vioxx [rofecoxib]      Past Medical History:   Diagnosis Date    Anemia, unspecified     CAD (coronary artery disease)     Chronic systolic HF (heart failure)     Crohn disease     Dementia     DM (diabetes mellitus)     Hemorrhoids     History of coronary angioplasty with insertion of stent     HLD (hyperlipidemia)     HTN (hypertension)     Memory loss     Paroxysmal atrial fibrillation      Past Surgical History:   Procedure Laterality Date    CHOLECYSTECTOMY      VENTRICULAR CARDIAC PACEMAKER INSERTION       Family History   Family history unknown: Yes     Social History[1]  Review of Systems   Constitutional:   Negative for fever.   HENT:  Negative for trouble swallowing and voice change.    Eyes:  Negative for visual disturbance.   Respiratory:  Negative for cough and shortness of breath.    Cardiovascular:  Negative for chest pain.   Gastrointestinal:  Positive for blood in stool. Negative for abdominal pain, diarrhea, nausea, rectal pain and vomiting.   Genitourinary:  Negative for dysuria and hematuria.   Musculoskeletal:  Negative for back pain and gait problem.   Skin:  Negative for color change and rash.   Neurological:  Negative for headaches.   Psychiatric/Behavioral:  Negative for behavioral problems and sleep disturbance.    All other systems reviewed and are negative.      Physical Exam     Initial Vitals [05/06/25 0805]   BP Pulse Resp Temp SpO2   111/67 74 18 97.3 °F (36.3 °C) 99 %      MAP       --         Physical Exam    Nursing note and vitals reviewed.  Constitutional: She appears well-developed.   HENT:   Head: Normocephalic.   Eyes: EOM are normal.   Neck: Neck supple.   Cardiovascular:  Normal rate.           Pulmonary/Chest: Breath sounds normal.   Abdominal: Abdomen is soft. Bowel sounds are normal. She exhibits no distension and no mass. There is no abdominal tenderness. There is no rebound and no guarding.   Genitourinary: Rectum:      Guaiac result positive.      External hemorrhoid and abnormal anal tone present.      No rectal mass, anal fissure, tenderness or internal hemorrhoid.   Guaiac positive stool. : Acceptable.  Musculoskeletal:         General: No edema. Normal range of motion.      Cervical back: Neck supple.     Neurological: She is alert. GCS score is 15. GCS eye subscore is 4. GCS verbal subscore is 5. GCS motor subscore is 6.         ED Course   Procedures  Orders Placed This Encounter    CBC auto differential    Basic metabolic panel    CBC with Differential    Occult Blood, Stool 1st Specimen    APTT    Protime-INR       Labs Reviewed   BASIC METABOLIC PANEL -  Abnormal       Result Value    Sodium 141      Potassium 4.2      Chloride 106      CO2 28      Glucose 97      Blood Urea Nitrogen 22.0 (*)     Creatinine 1.17 (*)     BUN/Creatinine Ratio 19      Calcium 8.8      Anion Gap 7.0      eGFR 45     CBC WITH DIFFERENTIAL - Abnormal    WBC 4.19 (*)     RBC 3.38 (*)     Hgb 9.7 (*)     Hct 30.2 (*)     MCV 89.3      MCH 28.7      MCHC 32.1 (*)     RDW 15.1      Platelet 247      MPV 10.6 (*)     Neut % 65.2      Lymph % 17.9      Mono % 11.2      Eos % 5.0      Basophil % 0.5      Imm Grans % 0.2      Neut # 2.73      Lymph # 0.75      Mono # 0.47      Eos # 0.21      Baso # 0.02      Imm Gran # 0.01      NRBC% 0.0     OCCULT BLOOD, STOOL 1ST SPECIMEN - Abnormal    Occult Blood Stool 1 Positive (*)    PROTIME-INR - Abnormal    PT 15.1 (*)     INR 1.1      Narrative:     Protimes are used to monitor anticoagulant agents such as warfarin. PT INR values are based on the current patient normal mean and the NORRIS value for the specific instrument reagent used.  **Routine theraputic target values for the INR are 2.0-3.0**   APTT - Normal    PTT 33.3     CBC W/ AUTO DIFFERENTIAL    Narrative:     The following orders were created for panel order CBC auto differential.  Procedure                               Abnormality         Status                     ---------                               -----------         ------                     CBC with Differential[[id number]]       Abnormal            Final result                 Please view results for these tests on the individual orders.          Imaging Results    None          Medications - No data to display  Medical Decision Making  Essentially patient started bleeding after she had a bowel movement, she has had blood in the stool in the past, on examination it appears to be more ruptured hemorrhoid.  She does have blood in the diaper.  No active bleeding at this time.  No blood in the rectum.    Amount and/or Complexity of  Data Reviewed  Labs: ordered.               ED Course as of 05/06/25 1749 Tue May 06, 2025   0906 Patient has a red blood in the diaper, and appears like she has a ruptured a hemorrhoid, she has minimal pain there according to her, I did not see any blood in the rectum, her H&H is better than what it was last time a couple of months back, her blood pressure is normal, [GQ]   0908 Her blood pressure is 113/57, heart rate is 73, O2 sat is 98% on room air, I will let her go back to the nursing home with instruction to repeat her hemoglobin hematocrit in 12-24 hours, hold the Eliquis and Plavix for the time being.  And ask family doctor when you can start again. [GQ]      ED Course User Index  [GQ] Yayo De León MD                           Clinical Impression:  Final diagnoses:  [K92.1] Hematochezia (Primary)          ED Disposition Condition    Discharge Stable          ED Prescriptions    None       Follow-up Information       Follow up With Specialties Details Why Contact Info    Asad Maya MD Internal Medicine In 1 day  [address] [facility] [address]  [phone]                 [1]   Social History  Tobacco Use    Smoking status: Never    Smokeless tobacco: Never   Substance Use Topics    Alcohol use: Never    Drug use: Never        Yayo De León MD  05/06/25 1743

## 2025-05-06 NOTE — DISCHARGE INSTRUCTIONS
Hold aspirin, Plavix, and Eliquis for the time being  Repeat hemoglobin and hematocrit level in 12-24 hours at this time her hemoglobin is 9.7 hematocrit is 30.2,   Asked family doctor went to start blood thinners again.        Patient must be seen by patient's primary care M.D. for follow-up and further treatment as needed.     Inform patients Primary Care physician about the ER visit and need for follow up.    Get medicines and orders from the emergency room approved by patient's primary care M.D.    The exam and treatment you received in Emergency Room was for an urgent problem and NOT INTENDED AS COMPLETE CARE. It is important that you FOLLOW UP with a doctor for ongoing care. If your symptoms become WORSE or you DO NOT IMPROVE and you are unable to reach your health care provider, you should RETURN to the emergency department. The Emergency Room doctor has provided a PRELIMINARY INTERPRETATION of all your STUDIES. A final interpretation may be done after you are discharged. IF A CHANGE in your diagnosis or treatment is needed WE WILL CONTACT YOU. It is critical that we have a CURRENT PHONE NUMBER FOR YOU.